# Patient Record
Sex: MALE | Race: WHITE | Employment: OTHER | URBAN - METROPOLITAN AREA
[De-identification: names, ages, dates, MRNs, and addresses within clinical notes are randomized per-mention and may not be internally consistent; named-entity substitution may affect disease eponyms.]

---

## 2017-02-27 RX ORDER — PRAVASTATIN SODIUM 20 MG/1
TABLET ORAL
Qty: 90 TAB | Refills: 1 | Status: SHIPPED | OUTPATIENT
Start: 2017-02-27 | End: 2017-03-08 | Stop reason: ALTCHOICE

## 2017-03-01 ENCOUNTER — LAB ONLY (OUTPATIENT)
Dept: INTERNAL MEDICINE CLINIC | Age: 82
End: 2017-03-01

## 2017-03-01 ENCOUNTER — HOSPITAL ENCOUNTER (OUTPATIENT)
Dept: LAB | Age: 82
Discharge: HOME | End: 2017-03-01
Payer: MEDICARE

## 2017-03-01 DIAGNOSIS — E78.5 DYSLIPIDEMIA: Primary | ICD-10-CM

## 2017-03-01 DIAGNOSIS — I25.10 ATHEROSCLEROSIS OF NATIVE CORONARY ARTERY OF NATIVE HEART WITHOUT ANGINA PECTORIS: ICD-10-CM

## 2017-03-01 DIAGNOSIS — E78.5 DYSLIPIDEMIA: ICD-10-CM

## 2017-03-01 DIAGNOSIS — Z00.00 ROUTINE GENERAL MEDICAL EXAMINATION AT A HEALTH CARE FACILITY: ICD-10-CM

## 2017-03-01 LAB
ALBUMIN SERPL BCP-MCNC: 3.9 G/DL (ref 3.4–5)
ALBUMIN/GLOB SERPL: 1.3 {RATIO} (ref 0.8–1.7)
ALP SERPL-CCNC: 76 U/L (ref 45–117)
ALT SERPL-CCNC: 28 U/L (ref 16–61)
ANION GAP BLD CALC-SCNC: 6 MMOL/L (ref 3–18)
AST SERPL W P-5'-P-CCNC: 19 U/L (ref 15–37)
BASOPHILS # BLD AUTO: 0 K/UL (ref 0–0.06)
BASOPHILS # BLD: 1 % (ref 0–2)
BILIRUB SERPL-MCNC: 0.5 MG/DL (ref 0.2–1)
BUN SERPL-MCNC: 19 MG/DL (ref 7–18)
BUN/CREAT SERPL: 20 (ref 12–20)
CALCIUM SERPL-MCNC: 9.3 MG/DL (ref 8.5–10.1)
CHLORIDE SERPL-SCNC: 106 MMOL/L (ref 100–108)
CHOLEST SERPL-MCNC: 212 MG/DL
CO2 SERPL-SCNC: 27 MMOL/L (ref 21–32)
CREAT SERPL-MCNC: 0.96 MG/DL (ref 0.6–1.3)
DIFFERENTIAL METHOD BLD: ABNORMAL
EOSINOPHIL # BLD: 0.2 K/UL (ref 0–0.4)
EOSINOPHIL NFR BLD: 5 % (ref 0–5)
ERYTHROCYTE [DISTWIDTH] IN BLOOD BY AUTOMATED COUNT: 13.9 % (ref 11.6–14.5)
GLOBULIN SER CALC-MCNC: 3.1 G/DL (ref 2–4)
GLUCOSE SERPL-MCNC: 88 MG/DL (ref 74–99)
HCT VFR BLD AUTO: 41.6 % (ref 36–48)
HDLC SERPL-MCNC: 46 MG/DL (ref 40–60)
HDLC SERPL: 4.6 {RATIO} (ref 0–5)
HGB BLD-MCNC: 13.6 G/DL (ref 13–16)
LDLC SERPL CALC-MCNC: 145 MG/DL (ref 0–100)
LIPID PROFILE,FLP: ABNORMAL
LYMPHOCYTES # BLD AUTO: 34 % (ref 21–52)
LYMPHOCYTES # BLD: 1.4 K/UL (ref 0.9–3.6)
MCH RBC QN AUTO: 31.1 PG (ref 24–34)
MCHC RBC AUTO-ENTMCNC: 32.7 G/DL (ref 31–37)
MCV RBC AUTO: 95 FL (ref 74–97)
MONOCYTES # BLD: 0.5 K/UL (ref 0.05–1.2)
MONOCYTES NFR BLD AUTO: 12 % (ref 3–10)
NEUTS SEG # BLD: 2 K/UL (ref 1.8–8)
NEUTS SEG NFR BLD AUTO: 48 % (ref 40–73)
PLATELET # BLD AUTO: 172 K/UL (ref 135–420)
PMV BLD AUTO: 10.7 FL (ref 9.2–11.8)
POTASSIUM SERPL-SCNC: 4 MMOL/L (ref 3.5–5.5)
PROT SERPL-MCNC: 7 G/DL (ref 6.4–8.2)
RBC # BLD AUTO: 4.38 M/UL (ref 4.7–5.5)
SODIUM SERPL-SCNC: 139 MMOL/L (ref 136–145)
TRIGL SERPL-MCNC: 105 MG/DL (ref ?–150)
TSH SERPL DL<=0.05 MIU/L-ACNC: 2.57 UIU/ML (ref 0.36–3.74)
VLDLC SERPL CALC-MCNC: 21 MG/DL
WBC # BLD AUTO: 4.1 K/UL (ref 4.6–13.2)

## 2017-03-01 PROCEDURE — 80061 LIPID PANEL: CPT | Performed by: INTERNAL MEDICINE

## 2017-03-01 PROCEDURE — 85025 COMPLETE CBC W/AUTO DIFF WBC: CPT | Performed by: INTERNAL MEDICINE

## 2017-03-01 PROCEDURE — 84443 ASSAY THYROID STIM HORMONE: CPT | Performed by: INTERNAL MEDICINE

## 2017-03-01 PROCEDURE — 80053 COMPREHEN METABOLIC PANEL: CPT | Performed by: INTERNAL MEDICINE

## 2017-03-01 PROCEDURE — 36415 COLL VENOUS BLD VENIPUNCTURE: CPT | Performed by: INTERNAL MEDICINE

## 2017-03-08 ENCOUNTER — OFFICE VISIT (OUTPATIENT)
Dept: INTERNAL MEDICINE CLINIC | Age: 82
End: 2017-03-08

## 2017-03-08 VITALS
OXYGEN SATURATION: 98 % | BODY MASS INDEX: 27.35 KG/M2 | DIASTOLIC BLOOD PRESSURE: 60 MMHG | HEIGHT: 70 IN | TEMPERATURE: 98.4 F | SYSTOLIC BLOOD PRESSURE: 138 MMHG | RESPIRATION RATE: 12 BRPM | WEIGHT: 191 LBS | HEART RATE: 59 BPM

## 2017-03-08 DIAGNOSIS — Z71.89 ADVANCE DIRECTIVE DISCUSSED WITH PATIENT: ICD-10-CM

## 2017-03-08 DIAGNOSIS — K21.00 REFLUX ESOPHAGITIS: ICD-10-CM

## 2017-03-08 DIAGNOSIS — E78.5 HYPERLIPIDEMIA LDL GOAL <100: ICD-10-CM

## 2017-03-08 DIAGNOSIS — I25.10 ATHEROSCLEROSIS OF NATIVE CORONARY ARTERY OF NATIVE HEART WITHOUT ANGINA PECTORIS: Primary | ICD-10-CM

## 2017-03-08 DIAGNOSIS — Z23 ENCOUNTER FOR IMMUNIZATION: ICD-10-CM

## 2017-03-08 DIAGNOSIS — Z00.00 MEDICARE ANNUAL WELLNESS VISIT, SUBSEQUENT: ICD-10-CM

## 2017-03-08 NOTE — PATIENT INSTRUCTIONS
Medicare Part B Preventive Services Limitations Recommendation Scheduled   Bone Mass Measurement  (age 72 & older, biennial) Requires diagnosis related to osteoporosis or estrogen deficiency. Biennial benefit unless patient has history of long-term glucocorticoid tx or baseline is needed because initial test was by other method Every 2 years or more frequently if medically necessary. N/A         Cardiovascular Screening Blood Tests (every 5 years)  Total cholesterol, HDL, Triglycerides Order as a panel if possible Every 5 years. Done:  3/1/2017         Colorectal Cancer Screening  -Fecal occult blood test (annual)  -Flexible sigmoidoscopy (5y)  -Screening colonoscopy (10y)  -Barium Enema Colorectal Cancer Screening (Ages 54-65 yrs old)  For all patients 48 and older:  -Annual fecal occult blood test or  colonoscopy every 10 years or  -Flexible sigmoidoscopy every 5 years or  -lower endoscopy to be performed more frequently, if advised by GI. N/A         Counseling to Prevent Tobacco Use (up to 8 sessions per year)  - Counseling greater than 3 and up to 10 minutes  - Counseling greater than 10 minutes Patients must be asymptomatic of tobacco-related conditions to receive as preventive service Two cessation counseling attempts (up to 8 counseling sessions) per year. N/A   Diabetes Screening Tests (at least every 3 years, Medicare covers annually or at 6-month intervals for prediabetic patients)    Fasting blood sugar (FBS) or glucose tolerance test (GTT) Patient must be diagnosed with one of the following:  -Hypertension, Dyslipidemia, obesity, previous impaired FBS or GTT  Or any two of the following: overweight, FH of diabetes, age ? 72, history of gestational diabetes, birth of baby weighing more than 9 pounds Annually or every 6 months if previous diagnosis of elevated FBS, elevated HbA1c, or impaired GTT, or glucosuria.  Done:  3/1/2017         Diabetes Self-Management Training (DSMT) (no USPSTF recommendation) Requires referral by treating physician for patient with diabetes or renal disease. 10 hours of initial DSMT session of no less than 30 minutes each in a continuous 12-month period. 2 hours of follow-up DSMT in subsequent years. Up to 10 hours of initial training within a continuous 12 month period of subsequent years: up to 2 hours of follow-up training each year after the initial year. N/A   Glaucoma Screening (no USPSTF recommendation) Diabetes mellitus, family history, , age 48 or over,  American, age 72 or over Annually for covered beneficiaries. Seen at [facility]. Will contact to request last eye exam.          Human Immunodeficiency Virus (HIV) Screening (annually for increased risk patients)  HIV-1 and HIV-2 by EIA, KARON, rapid antibody test, or oral mucosa transudate Patient must be at increased risk for HIV infection per USPSTF guidelines or pregnant. Tests covered annually for patients at increased risk. Pregnant patients may receive up to 3 test during pregnancy. Annually for beneficiaries at increased risk, including anyone who asks for the test. Not at risk   Medical Nutrition Therapy (MNT) (for diabetes or renal disease not recommended schedule) Requires referral by treating physician for patient with diabetes or renal disease. Can be provided in same year as diabetes self-management training (DSMT), and CMS recommends medical nutrition therapy take place after DSMT. Up to 3 hours for initial year and 2 hours in subsequent years. First year: 3 hours of one-on-one counseling or subsequent years: 2 hours.  N/A   Prostate Cancer Screening (annually up to age 76)  - Digital rectal exam (DEQUAN)  - Prostate specific antigen (PSA) Annually (age 48 or over), DEQUAN not paid separately when covered E/M service is provided on same date Once every 12 months for patients age older than 48years of age includes: digital rectal exam and/or prostate specific antigen test. N/A Seasonal Influenza Vaccination (annually)  Once per fall or winter season. Done:  3/8/2017           Pneumococcal Vaccination (once after 72)  Once after age 72 and if more than 5 years since last vaccination and/or uncertainty of vaccine status. Pneumococcal:  7/2/2014    Prevnar 13:  3/8/2017   Hepatitis B Vaccinations (if medium/high risk) Medium/high risk factors:  End-stage renal disease,  Hemophiliacs who received Factor VIII or IX concentrates, Clients of institutions for the mentally retarded, Persons who live in the same house as a HepB virus carrier, Homosexual men, Illicit injectable drug abusers. Schedule course of vaccines if patient not previously vaccinated  *additional shots if medically necessary. Not at risk   Screening Mammography (biennial age 54-69)? Annually (age 36 or over) Age 28 through 44: one baseline or aged 36 and older: annually. N/A         Screening Pap Tests and Pelvic Examination (up to age 79 and after 79 if unknown history or abnormal study last 10 years) Every 24 months except high risk Annually if at stevo risk for developing cervical or vaginal cancer, or childbearing, age with abnormal Pap test within past 3 years or every 2 years for women at normal risk. N/A           Ultrasound Screening for Abdominal Aortic Aneurysm (AAA) (once) Patient must be referred through IPPE and not have had a screening for abdominal aortic aneurysm before under Medicare. Limited to patients who meet one of the following criteria:  - Men who are 73-68 years old and have smoked more than 100 cigarettes in their lifetime.  -Anyone with a FH of AAA  -Anyone recommended for screening by USPSTF Once in a lifetime. N/A         Schedule of Personalized Health Plan  (Provide Copy to Patient)  The best way to stay healthy is to live a healthy lifestyle. A healthy lifestyle includes regular exercise, eating a well-balanced diet, keeping a healthy weight and not smoking.     Regular physical exams and screening tests are another important way to take care of yourself. Preventive exams provided by health care providers can find health problems early when treatment works best and can keep you from getting certain diseases or illnesses. Preventive services include exams, lab tests, screenings, shots, monitoring and information to help you take care of your own health. All people over 65 should have a pneumonia shot. Pneumonia shots are usually only needed once in a lifetime unless your doctor decides differently. All people over 65 should have a yearly flu shot. People over 65 are at medium to high risk for Hepatitis B. Three shots are needed for complete protection. In addition to your physical exam, some screening tests are recommended:    Bone mass measurement (dexa scan) is recommended every two years if you have certain risk factors, such as personal history of vertebral fracture or chronic steroid medication use    Diabetes Mellitus screening is recommended every year. Glaucoma is an eye disease caused by high pressure in the eye. An eye exam is recommended every year. Cardiovascular screening tests that check your cholesterol and other blood fat (lipid) levels are recommended every five years. Colorectal Cancer screening tests help to find pre-cancerous polyps (growths in the colon) so they can be removed before they turn into cancer. Tests ordered for screening depend on your personal and family history risk factors.     Screening for Prostate Cancer is recommended yearly with a digital rectal exam and/or a PSA test    Here is a list of your current Health Maintenance items with a due date:  Health Maintenance   Topic Date Due    GLAUCOMA SCREENING Q2Y  06/01/2017 (Originally 1/8/2017)   Ebenezer Velásquez DTaP/Tdap/Td series (1 - Tdap) 08/31/2017 (Originally 8/17/1956)   [id number] \[facility]\""  03/09/2018    ZOSTER VACCINE AGE 60>  Completed    Pneumococcal 65+ Low/Medium Risk  Completed    INFLUENZA AGE 9 TO ADULT  Completed            Advance Directives: Care Instructions  Your Care Instructions  An advance directive is a legal way to state your wishes at the end of your life. It tells your family and your doctor what to do if you can no longer say what you want. There are two main types of advance directives. You can change them any time that your wishes change. · A living will tells your family and your doctor your wishes about life support and other treatment. · A medical power of  lets you name a person to make treatment decisions for you when you can't speak for yourself. This person is called a health care agent. If you do not have an advance directive, decisions about your medical care may be made by a doctor or a  who doesn't know you. It may help to think of an advance directive as a gift to the people who care for you. If you have one, they won't have to make tough decisions by themselves. Follow-up care is a key part of your treatment and safety. Be sure to make and go to all appointments, and call your doctor if you are having problems. It's also a good idea to know your test results and keep a list of the medicines you take. How can you care for yourself at home? · Discuss your wishes with your loved ones and your doctor. This way, there are no surprises. · Many states have a unique form. Or you might use a universal form that has been approved by many states. This kind of form can sometimes be completed and stored online. Your electronic copy will then be available wherever you have a connection to the Internet. In most cases, doctors will respect your wishes even if you have a form from a different state. · You don't need a  to do an advance directive. But you may want to get legal advice. · Think about these questions when you prepare an advance directive:  ¨ Who do you want to make decisions about your medical care if you are not able to?  Many people choose a family member, close friend, or doctor. ¨ Do you know enough about life support methods that might be used? If not, talk to your doctor so you understand. ¨ What are you most afraid of that might happen? You might be afraid of having pain, losing your independence, or being kept alive by machines. ¨ Where would you prefer to die? Choices include your home, a hospital, or a nursing home. ¨ Would you like to have information about hospice care to support you and your family? ¨ Do you want to donate organs when you die? ¨ Do you want certain Sabianist practices performed before you die? If so, put your wishes in the advance directive. · Read your advance directive every year, and make changes as needed. When should you call for help? Be sure to contact your doctor if you have any questions. Where can you learn more? Go to http://alex-jeffry.info/. Enter R264 in the search box to learn more about \"Advance Directives: Care Instructions. \"  Current as of: February 24, 2016  Content Version: 11.1  © 1125-4775 [employer], [employer]orporated. Care instructions adapted under license by [employer] (which disclaims liability or warranty for this information). If you have questions about a medical condition or this instruction, always ask your healthcare professional. [address] any warranty or liability for your use of this information.

## 2017-03-08 NOTE — PROGRESS NOTES
Felix Salazar is a 80 y.o. male and presents for annual Medicare Wellness Visit. Problem List: Reviewed with patient and discussed risk factors. Patient Active Problem List   Diagnosis Code    Reflux esophagitis K21.0    Coronary artery disease I25.10    Advance directive discussed with patient Z71.89    Hyperlipidemia LDL goal <100 E78.5       Current medical providers:  Patient Care Team:  Gayla Tatum MD as PCP - General (Internal Medicine)  Edward Weiner, RN as Ambulatory Care Navigator (Internal Medicine)  Oren Tejada MD (Cardiology)  BRITT Langford (Physician Assistant)  Marielena Holguin, RN as Ambulatory Care Navigator (Internal Medicine)    PSH: Reviewed with patient  Past Surgical History:   Procedure Laterality Date    HX APPENDECTOMY      HX CORONARY ARTERY BYPASS GRAFT      9/10   LIMA - LAD/D1,  SVG - OM,  SVG - PDA    HX HERNIA REPAIR      1974        SH: Reviewed with patient  Social History   Substance Use Topics    Smoking status: Never Smoker    Smokeless tobacco: Never Used    Alcohol use No       FH: Reviewed with patient  Family History   Problem Relation Age of Onset    Cancer Mother      liver       Medications/Allergies: Reviewed with patient  Current Outpatient Prescriptions on File Prior to Visit   Medication Sig Dispense Refill    rosuvastatin (CRESTOR) 20 mg tablet Take 1 Tab by mouth nightly. 30 Tab 11    aspirin 81 mg tablet Take 81 mg by mouth.  MULTIVITAMIN PO Take  by mouth. No current facility-administered medications on file prior to visit.        Allergies   Allergen Reactions    Amoxicillin Other (comments)     Felt shaky and nervous    Lipitor [Atorvastatin] Myalgia    Pcn [Penicillins] Other (comments)     Diaphoretic, elevates blood pressure, insomnia    Pravastatin Myalgia       Objective:  Visit Vitals    /60    Pulse (!) 59    Temp 98.4 °F (36.9 °C) (Oral)    Resp 12    Ht 5' 10\" (1.778 m)    Wt 191 lb (86.6 kg)    SpO2 98%    BMI 27.41 kg/m2    Body mass index is 27.41 kg/(m^2). Assessment of cognitive impairment: Alert and oriented x 3    Depression Screen:   PHQ 2 / 9, over the last two weeks 3/8/2017   Little interest or pleasure in doing things Not at all   Feeling down, depressed or hopeless Not at all   Total Score PHQ 2 0       Fall Risk Assessment:    Fall Risk Assessment, last 12 mths 3/8/2017   Able to walk? Yes   Fall in past 12 months? No       Functional Ability:   Does the patient exhibit a steady gait? yes   How long did it take the patient to get up and walk from a sitting position? 1 second   Is the patient self reliant?  (ie can do own laundry, meals, household chores)  yes     Does the patient handle his/her own medications? yes     Does the patient handle his/her own money? yes     Is the patients home safe (ie good lighting, handrails on stairs and bath, etc.)? yes     Did you notice or did patient express any hearing difficulties? no     Did you notice or did patient express any vision difficulties?   no     Were distance and reading eye charts used?   no       Advance Care Planning:   Patient was offered the opportunity to discuss advance care planning:  yes     Does patient have an Advance Directive:  Yes; willing to provide copy to office   If no, did you provide information on Caring Connections?  no       Plan:      Orders Placed This Encounter    Pneumococcal conjugate 13 valent, IM (PREVNAR-13)    Influenza virus vaccine (FLUZONE HIGH-DOSE) 65 years and older    LIPID PANEL    METABOLIC PANEL, COMPREHENSIVE    CBC WITH AUTOMATED DIFF       Health Maintenance   Topic Date Due    GLAUCOMA SCREENING Q2Y  06/01/2017 (Originally 1/8/2017)   [facility] DTaP/Tdap/Td series (1 - Tdap) 08/31/2017 (Originally 8/17/1956)   [address] [place] [address]  03/09/2018    ZOSTER VACCINE AGE 60>  Completed    Pneumococcal 65+ Low/Medium Risk  Completed    INFLUENZA AGE 9 TO ADULT Completed       *Patient verbalized understanding and agreement with the plan. A copy of the After Visit Summary with personalized health plan was given to the patient today.

## 2017-03-08 NOTE — PROGRESS NOTES
Franny Manuel 1935, is a 80 y.o. male, who is seen today for reevaluation of atherosclerotic heart disease hyperlipidemia DJD. He feels generally well and is still working, almost [de-identified]years old. He works in the [employer] industry [employer]. His license comes up for renewal in August and he will need another checkup at that time he says. He is eating a healthy diet and taking all of his medicine correctly. No exertional chest discomfort or dyspnea, not quite as much stamina as he used to have. Past Medical History:   Diagnosis Date    BPH     CABG     9/10   LIMA - LAD/D1,  SVG - OM,  SVG - PDA    Coronary artery disease     Dyslipidemia     GERD     Peptic ulcer disease      Current Outpatient Prescriptions   Medication Sig Dispense Refill    rosuvastatin (CRESTOR) 20 mg tablet Take 1 Tab by mouth nightly. 30 Tab 11    aspirin 81 mg tablet Take 81 mg by mouth.  MULTIVITAMIN PO Take  by mouth. Past Surgical History:   Procedure Laterality Date    HX APPENDECTOMY      HX CORONARY ARTERY BYPASS GRAFT      9/10   LIMA - LAD/D1,  SVG - OM,  SVG - PDA    HX HERNIA REPAIR      1974     Allergies   Allergen Reactions    Amoxicillin Other (comments)     Felt shaky and nervous    Lipitor [Atorvastatin] Myalgia    Pcn [Penicillins] Other (comments)     Diaphoretic, elevates blood pressure, insomnia    Pravastatin Myalgia     Social History     Social History    Marital status:      Spouse name: N/A    Number of children: N/A    Years of education: N/A     Social History Main Topics    Smoking status: Never Smoker    Smokeless tobacco: Never Used    Alcohol use No    Drug use: No    Sexual activity: Not Asked     Other Topics Concern    None     Social History Narrative     Visit Vitals    /60    Pulse (!) 59    Temp 98.4 °F (36.9 °C) (Oral)    Resp 12    Ht 5' 10\" (1.778 m)    Wt 191 lb (86.6 kg)    SpO2 98%    BMI 27.41 kg/m2     Neck is supple. Carotids are 2+ without bruits. No adenopathy or thyromegaly. Lungs are clear to percussion. Good breath sounds with no wheezing or crackles. Heart reveals a regular rhythm with normal S1 and S2 no murmur gallop click or rub. Apical impulse is not palpable. Abdomen is soft and nontender with no hepatosplenomegaly or masses and no bruits. Extremities reveal no clubbing cyanosis or edema. Pulses are 2+ throughout. Gait is normal.    Results for orders placed or performed during the hospital encounter of 03/01/17   CBC WITH AUTOMATED DIFF   Result Value Ref Range    WBC 4.1 (L) 4.6 - 13.2 K/uL    RBC 4.38 (L) 4.70 - 5.50 M/uL    HGB 13.6 13.0 - 16.0 g/dL    HCT 41.6 36.0 - 48.0 %    MCV 95.0 74.0 - 97.0 FL    MCH 31.1 24.0 - 34.0 PG    MCHC 32.7 31.0 - 37.0 g/dL    RDW 13.9 11.6 - 14.5 %    PLATELET 072 602 - 294 K/uL    MPV 10.7 9.2 - 11.8 FL    NEUTROPHILS 48 40 - 73 %    LYMPHOCYTES 34 21 - 52 %    MONOCYTES 12 (H) 3 - 10 %    EOSINOPHILS 5 0 - 5 %    BASOPHILS 1 0 - 2 %    ABS. NEUTROPHILS 2.0 1.8 - 8.0 K/UL    ABS. LYMPHOCYTES 1.4 0.9 - 3.6 K/UL    ABS. MONOCYTES 0.5 0.05 - 1.2 K/UL    ABS. EOSINOPHILS 0.2 0.0 - 0.4 K/UL    ABS.  BASOPHILS 0.0 0.0 - 0.06 K/UL    DF AUTOMATED     LIPID PANEL   Result Value Ref Range    LIPID PROFILE          Cholesterol, total 212 (H) <200 MG/DL    Triglyceride 105 <150 MG/DL    HDL Cholesterol 46 40 - 60 MG/DL    LDL, calculated 145 (H) 0 - 100 MG/DL    VLDL, calculated 21 MG/DL    CHOL/HDL Ratio 4.6 0 - 5.0     METABOLIC PANEL, COMPREHENSIVE   Result Value Ref Range    Sodium 139 136 - 145 mmol/L    Potassium 4.0 3.5 - 5.5 mmol/L    Chloride 106 100 - 108 mmol/L    CO2 27 21 - 32 mmol/L    Anion gap 6 3.0 - 18 mmol/L    Glucose 88 74 - 99 mg/dL    BUN 19 (H) 7.0 - 18 MG/DL    Creatinine 0.96 0.6 - 1.3 MG/DL    BUN/Creatinine ratio 20 12 - 20      GFR est AA >60 >60 ml/min/1.73m2    GFR est non-AA >60 >60 ml/min/1.73m2    Calcium 9.3 8.5 - 10.1 MG/DL    Bilirubin, total 0.5 0.2 - 1.0 MG/DL    ALT (SGPT) 28 16 - 61 U/L    AST (SGOT) 19 15 - 37 U/L    Alk. phosphatase 76 45 - 117 U/L    Protein, total 7.0 6.4 - 8.2 g/dL    Albumin 3.9 3.4 - 5.0 g/dL    Globulin 3.1 2.0 - 4.0 g/dL    A-G Ratio 1.3 0.8 - 1.7     TSH 3RD GENERATION   Result Value Ref Range    TSH 2.57 0.36 - 3.74 uIU/mL     Assessment: #1. ASHD asymptomatic. He will continue aspirin 81 mg daily. #2. Hyperlipidemia not doing as well, he will try to tighten up his diet a little bit and continue Crestor 20 mg each evening. #3. DJD doing well. He gets around well without medication most of the time. He had a Medicare wellness evaluation this afternoon by Nicolasa Steiner and I agree with her note. Follow-up will be in early August for follow-up and to see if there is any paperwork he needs filled out for his license. Rodolfo Joseph MD FACP    Please note: This document has been produced using voice recognition software. Unrecognized errors in transcription may be present.

## 2017-03-23 ENCOUNTER — PATIENT OUTREACH (OUTPATIENT)
Dept: INTERNAL MEDICINE CLINIC | Age: 82
End: 2017-03-23

## 2017-03-23 NOTE — PROGRESS NOTES
Contacted Dr. Vasiliy Baird office to request most recent glaucoma screening. Spoke to Lenora Jenkins. Introduced self and reason for call. Provided 2 patient identifiers. Last seen in 2015. Provided office fax number.

## 2017-04-20 ENCOUNTER — PATIENT OUTREACH (OUTPATIENT)
Dept: INTERNAL MEDICINE CLINIC | Age: 82
End: 2017-04-20

## 2017-05-03 ENCOUNTER — TELEPHONE (OUTPATIENT)
Dept: INTERNAL MEDICINE CLINIC | Age: 82
End: 2017-05-03

## 2017-05-03 NOTE — TELEPHONE ENCOUNTER
Patient dropped off form to be filled out for the [address] [place] [address]. Placed in your box.

## 2017-05-09 ENCOUNTER — OFFICE VISIT (OUTPATIENT)
Dept: INTERNAL MEDICINE CLINIC | Age: 82
End: 2017-05-09

## 2017-05-09 VITALS
RESPIRATION RATE: 14 BRPM | DIASTOLIC BLOOD PRESSURE: 66 MMHG | BODY MASS INDEX: 27.32 KG/M2 | HEIGHT: 70 IN | OXYGEN SATURATION: 98 % | SYSTOLIC BLOOD PRESSURE: 130 MMHG | TEMPERATURE: 98.2 F | WEIGHT: 190.8 LBS | HEART RATE: 74 BPM

## 2017-05-09 DIAGNOSIS — I25.10 ATHEROSCLEROSIS OF NATIVE CORONARY ARTERY OF NATIVE HEART WITHOUT ANGINA PECTORIS: Primary | ICD-10-CM

## 2017-05-09 DIAGNOSIS — E78.5 HYPERLIPIDEMIA LDL GOAL <100: ICD-10-CM

## 2017-05-09 DIAGNOSIS — K21.00 REFLUX ESOPHAGITIS: ICD-10-CM

## 2017-05-09 NOTE — MR AVS SNAPSHOT
Visit Information Date & Time Provider Department Dept. Phone Encounter #  
 5/9/2017  1:30 PM Ginger Mcdaniels MD [facility] [address] [id number] Upcoming Health Maintenance Date Due  
 GLAUCOMA SCREENING Q2Y 6/1/2017* DTaP/Tdap/Td series (1 - Tdap) 8/31/2017* INFLUENZA AGE 9 TO ADULT 8/1/2017 MEDICARE YEARLY EXAM 3/9/2018 *Topic was postponed. The date shown is not the original due date. Allergies as of 5/9/2017  Review Complete On: 5/9/2017 By: Ginger Mcdaniels MD  
  
 Severity Noted Reaction Type Reactions Amoxicillin Medium 04/01/2016   Side Effect Other (comments) Felt shaky and nervous Lipitor [Atorvastatin]  06/02/2016    Myalgia Pcn [Penicillins]  03/29/2016   Side Effect Other (comments) Diaphoretic, elevates blood pressure, insomnia Pravastatin  06/02/2016    Myalgia Current Immunizations  Reviewed on 1/13/2015 Name Date Influenza High Dose Vaccine PF 3/8/2017, 11/17/2015  3:35 PM, 11/7/2013 Influenza Vaccine 1/13/2015 Influenza Vaccine Split 11/14/2012  9:45 AM, 9/20/2011 Influenza Vaccine Whole 9/13/2010 Pneumococcal Conjugate (PCV-13) 3/8/2017 Pneumococcal Polysaccharide (PPSV-23) 7/2/2014  9:51 AM  
 Zoster Vaccine, Live 1/13/2015 Not reviewed this visit You Were Diagnosed With   
  
 Codes Comments Atherosclerosis of native coronary artery of native heart without angina pectoris    -  Primary ICD-10-CM: I25.10 ICD-9-CM: 414.01 Hyperlipidemia LDL goal <100     ICD-10-CM: E78.5 ICD-9-CM: 272.4 Reflux esophagitis     ICD-10-CM: K21.0 ICD-9-CM: 530.11 Vitals BP Pulse Temp Resp Height(growth percentile) Weight(growth percentile) 130/66 (BP 1 Location: Right arm, BP Patient Position: Sitting) 74 98.2 °F (36.8 °C) (Oral) 14 5' 10\" (1.778 m) 190 lb 12.8 oz (86.5 kg) SpO2 BMI Smoking Status 98% 27.38 kg/m2 Never Smoker Vitals History BMI and BSA Data Body Mass Index Body Surface Area  
 27.38 kg/m 2 2.07 m 2 Preferred Pharmacy Pharmacy Name Phone [facility] [phone] Your Updated Medication List  
  
   
This list is accurate as of: 5/9/17  2:42 PM.  Always use your most recent med list.  
  
  
  
  
 aspirin 81 mg tablet Take 81 mg by mouth. MULTIVITAMIN PO Take  by mouth. rosuvastatin 20 mg tablet Commonly known as:  CRESTOR Take 1 Tab by mouth nightly. Introducing \[facility]\"" & HEALTH SERVICES! Cathy Dozier introduces [employer] patient portal. Now you can access parts of your medical record, email your doctor's office, and request medication refills online. 1. In your internet browser, go to [email or web address] 2. Click on the First Time User? Click Here link in the Sign In box. You will see the New Member Sign Up page. 3. Enter your [employer] Access Code exactly as it appears below. You will not need to use this code after youve completed the sign-up process. If you do not sign up before the expiration date, you must request a new code. · [employer] Access Code: [email or web address] Expires: 6/6/2017  2:52 PM 
 
4. Enter the last four digits of your Social Security Number (xxxx) and Date of Birth (mm/dd/yyyy) as indicated and click Submit. You will be taken to the next sign-up page. 5. Create a [employer] ID. This will be your [employer] login ID and cannot be changed, so think of one that is secure and easy to remember. 6. Create a [employer] password. You can change your password at any time. 7. Enter your Password Reset Question and Answer. This can be used at a later time if you forget your password. 8. Enter your e-mail address. You will receive e-mail notification when new information is available in [address] 9. Click Sign Up. You can now view and download portions of your medical record. 10. Click the Download Summary menu link to download a portable copy of your medical information. If you have questions, please visit the Frequently Asked Questions section of the [employer] website. Remember, [employer] is NOT to be used for urgent needs. For medical emergencies, dial 911. Now available from your iPhone and Android! Please provide this summary of care documentation to your next provider. Your primary care clinician is listed as 77Niraj Vera. If you have any questions after today's visit, please call [phone].

## 2017-05-10 NOTE — PROGRESS NOTES
Charlette Morales 1935, is a 80 y.o. male, who is seen today for reevaluation of atherosclerotic heart disease hyperlipidemia and reflux. He is currently doing very well with reflux and is now off medication. Is having no day or night symptoms. He has a history of atherosclerotic heart disease having had bypass surgery in September 2010 has been pain-free since then. He denies exertional discomfort in the chest neck jaw back or arm. He also denies dyspnea on exertion PND or orthopnea. He has hyperlipidemia and takes Crestor regularly and follows a very low fat diet and maintains good weight. Past Medical History:   Diagnosis Date    BPH     CABG     9/10   LIMA - LAD/D1,  SVG - OM,  SVG - PDA    Coronary artery disease     Dyslipidemia     GERD     Peptic ulcer disease      Current Outpatient Prescriptions   Medication Sig Dispense Refill    rosuvastatin (CRESTOR) 20 mg tablet Take 1 Tab by mouth nightly. 30 Tab 11    aspirin 81 mg tablet Take 81 mg by mouth.  MULTIVITAMIN PO Take  by mouth. Visit Vitals    /66 (BP 1 Location: Right arm, BP Patient Position: Sitting)    Pulse 74    Temp 98.2 °F (36.8 °C) (Oral)    Resp 14    Ht 5' 10\" (1.778 m)    Wt 190 lb 12.8 oz (86.5 kg)    SpO2 98%    BMI 27.38 kg/m2     Carotids are 2+ without bruits. Lungs are clear to percussion. Good breath sounds with no wheezing or crackles. Visual fields on right and left are full with vision to 170° in the upper and lower quadrants laterally in both the right and left eye. Color vision is normal.  Visual acuity with glasses is 20/20 in the right and in the left and with both. Heart reveals a regular rhythm with normal S1 and S2 no murmur gallop click or rub. Apical impulse is not palpable. Abdomen is soft and nontender with no hepatosplenomegaly or masses and no bruits. Extremities reveal no clubbing cyanosis or edema. Pulses are 2+ throughout. Romberg is absent.   Gait is normal. Neurologic exam is nonfocal throughout. Assessment: #1. Atherosclerotic heart disease asymptomatic since bypass surgery which was done in September 2010. He will continue aspirin 81 mg daily. #2. Hyperlipidemia doing well. He will continue Crestor 20 mg daily. #3.  History of reflux doing well. No medication is needed currently. If reflux becomes frequent he will call again. Rodolfo Palma MD FACP    Please note: This document has been produced using voice recognition software. Unrecognized errors in transcription may be present.

## 2017-05-17 ENCOUNTER — TELEPHONE (OUTPATIENT)
Dept: INTERNAL MEDICINE CLINIC | Age: 82
End: 2017-05-17

## 2017-08-01 DIAGNOSIS — I25.10 ATHEROSCLEROSIS OF NATIVE CORONARY ARTERY OF NATIVE HEART WITHOUT ANGINA PECTORIS: ICD-10-CM

## 2017-08-26 RX ORDER — PRAVASTATIN SODIUM 20 MG/1
TABLET ORAL
Qty: 90 TAB | Refills: 1 | Status: SHIPPED | OUTPATIENT
Start: 2017-08-26 | End: 2018-03-05 | Stop reason: SINTOL

## 2018-03-05 ENCOUNTER — OFFICE VISIT (OUTPATIENT)
Dept: INTERNAL MEDICINE CLINIC | Age: 83
End: 2018-03-05

## 2018-03-05 VITALS
OXYGEN SATURATION: 96 % | SYSTOLIC BLOOD PRESSURE: 138 MMHG | WEIGHT: 189 LBS | HEIGHT: 70 IN | DIASTOLIC BLOOD PRESSURE: 68 MMHG | BODY MASS INDEX: 27.06 KG/M2 | HEART RATE: 63 BPM | TEMPERATURE: 98 F | RESPIRATION RATE: 12 BRPM

## 2018-03-05 DIAGNOSIS — I10 ESSENTIAL HYPERTENSION: ICD-10-CM

## 2018-03-05 DIAGNOSIS — K21.00 REFLUX ESOPHAGITIS: ICD-10-CM

## 2018-03-05 DIAGNOSIS — E78.5 HYPERLIPIDEMIA LDL GOAL <100: ICD-10-CM

## 2018-03-05 DIAGNOSIS — I25.10 ATHEROSCLEROSIS OF NATIVE CORONARY ARTERY OF NATIVE HEART WITHOUT ANGINA PECTORIS: Primary | ICD-10-CM

## 2018-03-05 RX ORDER — ROSUVASTATIN CALCIUM 20 MG/1
20 TABLET, COATED ORAL
Qty: 90 TAB | Refills: 3 | Status: SHIPPED | OUTPATIENT
Start: 2018-03-05 | End: 2018-05-17 | Stop reason: SDUPTHER

## 2018-03-05 NOTE — MR AVS SNAPSHOT
[facility] 
 
 
 [address], [address] [facility] 
[phone] Patient: Pablito Kay MRN: [id number] HRD:6/89/1842 Visit Information Date & Time Provider Department Dept. Phone Encounter #  
 3/5/2018  2:00 PM Carlita Garcia MD [facility] [phone] [id number] Follow-up Instructions Follow-up and Disposition History Your Appointments 3/5/2018  2:00 PM  
Office Visit with Carlita Garcia MD  
[facility]) Appt Note: 6 mo f/u  
 [address] [facility], [place] [address] [address]  
  
   
 [address], [address]  
  
    
 8/31/2018 10:55 AM  
LAB with [facility] NURSE VISIT [facility] ([facility]) Appt Note: lab  
 [address], [address] [address] [address]  
  
   
 [address], [phone] [phone]  
  
    
 9/7/2018  3:00 PM  
PHYSICAL with Carlita Garcia MD  
[facility]) Appt Note: rpe  
 [address] [facility], [facility] [address] [address]  
  
   
 [address], [address] Upcoming Health Maintenance Date Due DTaP/Tdap/Td series (1 - Tdap) 8/17/1956 MEDICARE YEARLY EXAM 3/9/2018 GLAUCOMA SCREENING Q2Y 5/26/2019 Allergies as of 3/5/2018  Review Complete On: 3/5/2018 By: Carlita Garcia MD  
  
 Severity Noted Reaction Type Reactions Amoxicillin Medium 04/01/2016   Side Effect Other (comments) Felt shaky and nervous Lipitor [Atorvastatin]  06/02/2016    Myalgia Pcn [Penicillins]  03/29/2016   Side Effect Other (comments) Diaphoretic, elevates blood pressure, insomnia Pravastatin  06/02/2016    Myalgia Current Immunizations  Reviewed on 1/13/2015 Name Date Influenza High Dose Vaccine PF 3/8/2017, 11/17/2015  3:35 PM, 11/7/2013 Influenza Vaccine 1/13/2015 Influenza Vaccine Split 11/14/2012  9:45 AM, 9/20/2011 Influenza Vaccine Whole 9/13/2010 Pneumococcal Conjugate (PCV-13) 3/8/2017 Pneumococcal Polysaccharide (PPSV-23) 7/2/2014  9:51 AM  
 Zoster Vaccine, Live 1/13/2015 Not reviewed this visit You Were Diagnosed With   
  
 Codes Comments Atherosclerosis of native coronary artery of native heart without angina pectoris    -  Primary ICD-10-CM: I25.10 ICD-9-CM: 414.01 Hyperlipidemia LDL goal <100     ICD-10-CM: E78.5 ICD-9-CM: 272.4 Reflux esophagitis     ICD-10-CM: K21.0 ICD-9-CM: 530.11 Essential hypertension     ICD-10-CM: I10 
ICD-9-CM: 401.9 Vitals BP Pulse Temp Resp Height(growth percentile) Weight(growth percentile)  
 138/68 63 98 °F (36.7 °C) (Oral) 12 5' 10\" (1.778 m) 189 lb (85.7 kg) SpO2 BMI Smoking Status 96% 27.12 kg/m2 Never Smoker Vitals History BMI and BSA Data Body Mass Index Body Surface Area  
 27.12 kg/m 2 2.06 m 2 Preferred Pharmacy Pharmacy Name Phone [facility], [address] [place] [address] [phone] Your Updated Medication List  
  
   
This list is accurate as of 3/5/18  1:11 PM.  Always use your most recent med list.  
  
  
  
  
 aspirin 81 mg tablet Take 81 mg by mouth. MULTIVITAMIN PO Take  by mouth. rosuvastatin 20 mg tablet Commonly known as:  CRESTOR Take 1 Tab by mouth nightly. Prescriptions Sent to Pharmacy Refills  
 rosuvastatin (CRESTOR) 20 mg tablet 3 Sig: Take 1 Tab by mouth nightly. Class: Normal  
 Pharmacy: [id number] [address] [facility], [address] [place] [address] #: [phone] Route: Oral  
  
To-Do List   
 Around 09/19/2018 Lab:  CBC WITH AUTOMATED DIFF Around 09/19/2018   Lab:  LIPID PANEL   
  
 Around 09/19/2018 Lab:  METABOLIC PANEL, COMPREHENSIVE Introducing [facility] & HEALTH SERVICES! Alfonso Arellano introduces [employer] patient portal. Now you can access parts of your medical record, email your doctor's office, and request medication refills online. 1. In your internet browser, go to [email or web address] 2. Click on the First Time User? Click Here link in the Sign In box. You will see the New Member Sign Up page. 3. Enter your [employer] Access Code exactly as it appears below. You will not need to use this code after youve completed the sign-up process. If you do not sign up before the expiration date, you must request a new code. · [employer] Access Code: [email or web address] Expires: 6/3/2018  1:11 PM 
 
4. Enter the last four digits of your Social Security Number (xxxx) and Date of Birth (mm/dd/yyyy) as indicated and click Submit. You will be taken to the next sign-up page. 5. Create a [employer] ID. This will be your [employer] login ID and cannot be changed, so think of one that is secure and easy to remember. 6. Create a [employer] password. You can change your password at any time. 7. Enter your Password Reset Question and Answer. This can be used at a later time if you forget your password. 8. Enter your e-mail address. You will receive e-mail notification when new information is available in [address] 9. Click Sign Up. You can now view and download portions of your medical record. 10. Click the Download Summary menu link to download a portable copy of your medical information. If you have questions, please visit the Frequently Asked Questions section of the [employer] website. Remember, [employer] is NOT to be used for urgent needs. For medical emergencies, dial 911. Now available from your iPhone and Android! Please provide this summary of care documentation to your next provider. Your primary care clinician is listed as Michelle Shine. Carmen Granados.  If you have any questions after today's visit, please call [phone].

## 2018-05-17 ENCOUNTER — TELEPHONE (OUTPATIENT)
Dept: INTERNAL MEDICINE CLINIC | Age: 83
End: 2018-05-17

## 2018-05-17 NOTE — TELEPHONE ENCOUNTER
Patient daughter Payam Leung calling says patient has allergic reactions to the generic for Crestor. Pharmacy told her to ask RM to write script for Crestor but to jean it saying it has to be name brand.  Sent to [facility] on [address]

## 2018-05-18 RX ORDER — ROSUVASTATIN CALCIUM 20 MG/1
20 TABLET, FILM COATED ORAL
Qty: 90 TAB | Refills: 3 | Status: SHIPPED | OUTPATIENT
Start: 2018-05-18 | End: 2018-05-23 | Stop reason: SDUPTHER

## 2018-05-22 NOTE — TELEPHONE ENCOUNTER
Pts daughter is in office trying to get pts medication Crestor sent [facility] [address].  He needs the script sent marked as name brand or they will not give name brand rx      Pt is out of medication    Ambar # [phone]

## 2018-05-23 RX ORDER — ROSUVASTATIN CALCIUM 20 MG/1
20 TABLET, FILM COATED ORAL
Qty: 90 TAB | Refills: 3 | Status: SHIPPED | OUTPATIENT
Start: 2018-05-23 | End: 2019-06-03 | Stop reason: SDUPTHER

## 2018-07-09 PROBLEM — I61.2 NONTRAUMATIC HEMORRHAGE OF RIGHT CEREBRAL HEMISPHERE (HCC): Status: ACTIVE | Noted: 2018-05-08

## 2018-08-31 ENCOUNTER — HOSPITAL ENCOUNTER (OUTPATIENT)
Dept: LAB | Age: 83
Discharge: HOME | End: 2018-08-31
Payer: MEDICARE

## 2018-08-31 ENCOUNTER — APPOINTMENT (OUTPATIENT)
Dept: INTERNAL MEDICINE CLINIC | Age: 83
End: 2018-08-31

## 2018-08-31 DIAGNOSIS — E78.5 HYPERLIPIDEMIA LDL GOAL <100: ICD-10-CM

## 2018-08-31 DIAGNOSIS — I10 ESSENTIAL HYPERTENSION: ICD-10-CM

## 2018-08-31 LAB
ALBUMIN SERPL-MCNC: 4.2 G/DL (ref 3.4–5)
ALBUMIN/GLOB SERPL: 1.4 {RATIO} (ref 0.8–1.7)
ALP SERPL-CCNC: 78 U/L (ref 45–117)
ALT SERPL-CCNC: 27 U/L (ref 16–61)
ANION GAP SERPL CALC-SCNC: 6 MMOL/L (ref 3–18)
AST SERPL-CCNC: 17 U/L (ref 15–37)
BASOPHILS # BLD: 0 K/UL (ref 0–0.1)
BASOPHILS NFR BLD: 1 % (ref 0–2)
BILIRUB SERPL-MCNC: 0.5 MG/DL (ref 0.2–1)
BUN SERPL-MCNC: 20 MG/DL (ref 7–18)
BUN/CREAT SERPL: 22 (ref 12–20)
CALCIUM SERPL-MCNC: 9.9 MG/DL (ref 8.5–10.1)
CHLORIDE SERPL-SCNC: 109 MMOL/L (ref 100–108)
CHOLEST SERPL-MCNC: 189 MG/DL
CO2 SERPL-SCNC: 27 MMOL/L (ref 21–32)
CREAT SERPL-MCNC: 0.89 MG/DL (ref 0.6–1.3)
DIFFERENTIAL METHOD BLD: ABNORMAL
EOSINOPHIL # BLD: 0.1 K/UL (ref 0–0.4)
EOSINOPHIL NFR BLD: 3 % (ref 0–5)
ERYTHROCYTE [DISTWIDTH] IN BLOOD BY AUTOMATED COUNT: 14.3 % (ref 11.6–14.5)
GLOBULIN SER CALC-MCNC: 3.1 G/DL (ref 2–4)
GLUCOSE SERPL-MCNC: 90 MG/DL (ref 74–99)
HCT VFR BLD AUTO: 39.9 % (ref 36–48)
HDLC SERPL-MCNC: 55 MG/DL (ref 40–60)
HDLC SERPL: 3.4 {RATIO} (ref 0–5)
HGB BLD-MCNC: 13.3 G/DL (ref 13–16)
LDLC SERPL CALC-MCNC: 117.2 MG/DL (ref 0–100)
LIPID PROFILE,FLP: ABNORMAL
LYMPHOCYTES # BLD: 1.3 K/UL (ref 0.9–3.6)
LYMPHOCYTES NFR BLD: 37 % (ref 21–52)
MCH RBC QN AUTO: 31.2 PG (ref 24–34)
MCHC RBC AUTO-ENTMCNC: 33.3 G/DL (ref 31–37)
MCV RBC AUTO: 93.7 FL (ref 74–97)
MONOCYTES # BLD: 0.4 K/UL (ref 0.05–1.2)
MONOCYTES NFR BLD: 12 % (ref 3–10)
NEUTS SEG # BLD: 1.7 K/UL (ref 1.8–8)
NEUTS SEG NFR BLD: 47 % (ref 40–73)
PLATELET # BLD AUTO: 141 K/UL (ref 135–420)
PMV BLD AUTO: 12.3 FL (ref 9.2–11.8)
POTASSIUM SERPL-SCNC: 4.1 MMOL/L (ref 3.5–5.5)
PROT SERPL-MCNC: 7.3 G/DL (ref 6.4–8.2)
RBC # BLD AUTO: 4.26 M/UL (ref 4.7–5.5)
SODIUM SERPL-SCNC: 142 MMOL/L (ref 136–145)
TRIGL SERPL-MCNC: 84 MG/DL (ref ?–150)
VLDLC SERPL CALC-MCNC: 16.8 MG/DL
WBC # BLD AUTO: 3.5 K/UL (ref 4.6–13.2)

## 2018-08-31 PROCEDURE — 80061 LIPID PANEL: CPT | Performed by: INTERNAL MEDICINE

## 2018-08-31 PROCEDURE — 36415 COLL VENOUS BLD VENIPUNCTURE: CPT | Performed by: INTERNAL MEDICINE

## 2018-08-31 PROCEDURE — 85025 COMPLETE CBC W/AUTO DIFF WBC: CPT | Performed by: INTERNAL MEDICINE

## 2018-08-31 PROCEDURE — 80053 COMPREHEN METABOLIC PANEL: CPT | Performed by: INTERNAL MEDICINE

## 2018-09-25 ENCOUNTER — OFFICE VISIT (OUTPATIENT)
Dept: INTERNAL MEDICINE CLINIC | Age: 83
End: 2018-09-25

## 2018-09-25 VITALS
TEMPERATURE: 98.3 F | HEART RATE: 79 BPM | OXYGEN SATURATION: 96 % | DIASTOLIC BLOOD PRESSURE: 62 MMHG | HEIGHT: 70 IN | WEIGHT: 170 LBS | SYSTOLIC BLOOD PRESSURE: 126 MMHG | RESPIRATION RATE: 16 BRPM | BODY MASS INDEX: 24.34 KG/M2

## 2018-09-25 DIAGNOSIS — I61.2 NONTRAUMATIC HEMORRHAGE OF RIGHT CEREBRAL HEMISPHERE (HCC): ICD-10-CM

## 2018-09-25 DIAGNOSIS — I25.10 ATHEROSCLEROSIS OF NATIVE CORONARY ARTERY OF NATIVE HEART WITHOUT ANGINA PECTORIS: Primary | ICD-10-CM

## 2018-09-25 DIAGNOSIS — E78.5 HYPERLIPIDEMIA LDL GOAL <100: ICD-10-CM

## 2018-09-25 DIAGNOSIS — Z23 ENCOUNTER FOR IMMUNIZATION: ICD-10-CM
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
## 2018-09-25 NOTE — MR AVS SNAPSHOT
[facility] 
 
 
 [address], [address] [place] [address] [facility] 
[phone] Patient: Ashley Munguia MRN: [id number] Visit Information Date & Time Provider Department Dept. Phone Encounter #  
 9/25/2018  3:30 PM Lucy Orellana MD [facility] [phone] Follow-up Instructions Return in about 6 months (around 3/25/2019). Your Appointments 9/25/2018  3:30 PM  
PHYSICAL with Lucy Orellana MD  
[facility] [address]) Appt Note: rsd from 9/7/18 (rm out of office) [address], [address] [place] [address]  
  
   
 [address], [address] [place] [address]  
  
    
 3/27/2019  1:30 PM  
Office Visit with Lucy Orellana MD  
[facility] [address]) Appt Note: 6 month f/u  
 [address] [facility] [address] [id number] [address]  
  
   
 [address], [address] Upcoming Health Maintenance Date Due DTaP/Tdap/Td series (1 - Tdap) 8/17/1956 Shingrix Vaccine Age 50> (1 of 2) 8/17/1987 MEDICARE YEARLY EXAM 3/14/2018 Influenza Age 5 to Adult 8/1/2018 GLAUCOMA SCREENING Q2Y 5/26/2019 Allergies as of 9/25/2018  Review Complete On: 9/25/2018 By: Lucy Orellana MD  
  
 Severity Noted Reaction Type Reactions Amoxicillin Medium 04/01/2016   Side Effect Other (comments) Felt shaky and nervous Lipitor [Atorvastatin]  06/02/2016    Myalgia Pcn [Penicillins]  03/29/2016   Side Effect Other (comments) Diaphoretic, elevates blood pressure, insomnia Pravastatin  06/02/2016    Myalgia Current Immunizations  Reviewed on 9/25/2018 Name Date Influenza High Dose Vaccine PF 3/8/2017, 11/17/2015  3:35 PM, 11/7/2013 Influenza Vaccine 1/13/2015 Influenza Vaccine (Tri) Adjuvanted  Incomplete Influenza Vaccine Split 11/14/2012  9:45 AM, 9/20/2011 Influenza Vaccine Whole 9/13/2010 Pneumococcal Conjugate (PCV-13) 3/8/2017 Pneumococcal Polysaccharide (PPSV-23) 7/2/2014  9:51 AM  
 Zoster Vaccine, Live 1/13/2015 Reviewed by José Miguel Mcdonald MD on 9/25/2018 at  2:54 PM  
 Reviewed by Jsoé Miguel Mcdonald MD on 9/25/2018 at  2:54 PM  
You Were Diagnosed With   
  
 Codes Comments Atherosclerosis of native coronary artery of native heart without angina pectoris    -  Primary ICD-10-CM: I25.10 ICD-9-CM: 414.01 Encounter for immunization     ICD-10-CM: B38 ICD-9-CM: V03.89 Hyperlipidemia LDL goal <100     ICD-10-CM: E78.5 ICD-9-CM: 272.4 Nontraumatic hemorrhage of right cerebral hemisphere [facility])     ICD-10-CM: I61.2 ICD-9-CM: 773 Vitals BP Pulse Temp Resp Height(growth percentile) Weight(growth percentile) 126/62 (BP 1 Location: Left arm, BP Patient Position: Sitting) 79 98.3 °F (36.8 °C) (Oral) 16 5' 10\" (1.778 m) 170 lb (77.1 kg) SpO2 BMI Smoking Status 96% 24.39 kg/m2 Never Smoker Vitals History BMI and BSA Data Body Mass Index Body Surface Area  
 24.39 kg/m 2 1.95 m 2 Preferred Pharmacy Pharmacy Name Phone [facility], [address] [place] [address] [phone] Your Updated Medication List  
  
   
This list is accurate as of 9/25/18  3:10 PM.  Always use your most recent med list.  
  
  
  
  
 aspirin 81 mg tablet Take 81 mg by mouth. CRESTOR 20 mg tablet Generic drug:  rosuvastatin Take 1 Tab by mouth nightly. MULTIVITAMIN PO Take  by mouth. We Performed the Following INFLUENZA VACCINE INACTIVATED (IIV), SUBUNIT, ADJUVANTED, IM [id number] CPT(R)] Follow-up Instructions Return in about 6 months (around 3/25/2019). Introducing [facility]!    
 Ratna Lara introduces ivWatch patient portal. Now you can access parts of your medical record, email your doctor's office, and request medication refills online. 1. In your internet browser, go to [email or web address] 2. Click on the First Time User? Click Here link in the Sign In box. You will see the New Member Sign Up page. 3. Enter your [employer] Access Code exactly as it appears below. You will not need to use this code after youve completed the sign-up process. If you do not sign up before the expiration date, you must request a new code. · [employer] Access Code: [email or web address] Expires: 12/24/2018  2:33 PM 
 
4. Enter the last four digits of your Social Security Number (xxxx) and Date of Birth (mm/dd/yyyy) as indicated and click Submit. You will be taken to the next sign-up page. 5. Create a [employer] ID. This will be your [employer] login ID and cannot be changed, so think of one that is secure and easy to remember. 6. Create a [employer] password. You can change your password at any time. 7. Enter your Password Reset Question and Answer. This can be used at a later time if you forget your password. 8. Enter your e-mail address. You will receive e-mail notification when new information is available in [address] 9. Click Sign Up. You can now view and download portions of your medical record. 10. Click the Download Summary menu link to download a portable copy of your medical information. If you have questions, please visit the Frequently Asked Questions section of the [employer] website. Remember, [employer] is NOT to be used for urgent needs. For medical emergencies, dial 911. Now available from your iPhone and Android! Please provide this summary of care documentation to your next provider. Your primary care clinician is listed as Campbell Avila Covert. If you have any questions after today's visit, please call [phone].

## 2018-09-25 NOTE — PROGRESS NOTES
1. Have you been to the ER, urgent care clinic or hospitalized since your last visit? YES 
      
 
2. Have you seen or consulted any other health care providers outside of the [address] [place] [address] since your last visit (Include any pap smears or colon screening)? YES Do you have an Advanced Directive? YES Would you like information on Advanced Directives?  NO

## 2019-03-27 ENCOUNTER — OFFICE VISIT (OUTPATIENT)
Dept: INTERNAL MEDICINE CLINIC | Age: 84
End: 2019-03-27

## 2019-03-27 VITALS
RESPIRATION RATE: 12 BRPM | DIASTOLIC BLOOD PRESSURE: 82 MMHG | HEART RATE: 47 BPM | SYSTOLIC BLOOD PRESSURE: 132 MMHG | HEIGHT: 70 IN | WEIGHT: 184.2 LBS | OXYGEN SATURATION: 98 % | BODY MASS INDEX: 26.37 KG/M2 | TEMPERATURE: 97.8 F

## 2019-03-27 DIAGNOSIS — I25.10 ATHEROSCLEROSIS OF NATIVE CORONARY ARTERY OF NATIVE HEART WITHOUT ANGINA PECTORIS: Primary | ICD-10-CM

## 2019-03-27 DIAGNOSIS — E78.5 HYPERLIPIDEMIA LDL GOAL <100: ICD-10-CM

## 2019-03-27 DIAGNOSIS — K21.00 REFLUX ESOPHAGITIS: ICD-10-CM

## 2019-03-27 NOTE — PROGRESS NOTES
Chief Complaint Patient presents with  Cholesterol Problem 6 month follow up    ROOM   9  
 
 
1. Have you been to the ER, urgent care clinic since your last visit? Hospitalized since your last visit? Yes When: 12-21-18 [facility] Reason: Edema, and Dyspnea on Exertion, New Onset Atrial Fibrillation. 2. Have you seen or consulted any other health care providers outside of the [address] [place] [address] since your last visit? Include any pap smears or colon screening. No 
 
Patient was given a copy of the Advanced Directive and understands to bring it in once completed. Health Maintenance Due Topic Date Due  
 DTaP/Tdap/Td series (1 - Tdap) 08/17/1958  Shingrix Vaccine Age 50> (1 of 2) 08/17/1987  MEDICARE YEARLY EXAM  03/14/2018  GLAUCOMA SCREENING Q2Y  05/26/2019

## 2019-03-27 NOTE — PATIENT INSTRUCTIONS
Patient was given a copy of the Advanced Medical Directive Form, and understands to bring it in once completed. Health Maintenance Due Topic Date Due  
 DTaP/Tdap/Td series (1 - Tdap) 08/17/1958  Shingrix Vaccine Age 50> (1 of 2) 08/17/1987  MEDICARE YEARLY EXAM  03/14/2018  GLAUCOMA SCREENING Q2Y  05/26/2019

## 2019-03-27 NOTE — PROGRESS NOTES
Toribio Cockayne 1935, is a 82 y.o. male, who is seen today for reevaluation of atherosclerotic heart disease lipidemia hypertension brain injury. He is back working full-time and feeling great. He is having no chest pain or dyspnea. He takes his medicine regularly. No more falls or injuries. Past Medical History:  
Diagnosis Date  BPH  CABG   
 9/10   LIMA - LAD/D1,  SVG - OM,  SVG - PDA  Coronary artery disease  Dyslipidemia  GERD  Nontraumatic hemorrhage of right cerebral hemisphere ([facility] [address] 5/8/2018  Peptic ulcer disease  Stroke ([facility] 06/01/2018 Current Outpatient Medications Medication Sig Dispense Refill  CRESTOR 20 mg tablet Take 1 Tab by mouth nightly. 90 Tab 3  
 aspirin 81 mg tablet Take 81 mg by mouth daily.  MULTIVITAMIN PO Take  by mouth daily. Visit Vitals /82 (BP 1 Location: Left arm, BP Patient Position: Sitting) Pulse (!) 47 Temp 97.8 °F (36.6 °C) (Oral) Resp 12 Ht 5' 10\" (1.778 m) Wt 184 lb 3.2 oz (83.6 kg) SpO2 98% BMI 26.43 kg/m² Carotids are 2+ without bruits. Lungs are clear to percussion. Good breath sounds with no wheezing or crackles. Heart reveals a regular rhythm with occasional extrasystole. No murmur gallop click or rub. Apical impulse is not palpable. Abdomen is soft and nontender with no hepatosplenomegaly or masses and no bruits. Extremities reveal no clubbing cyanosis or edema. Pulses are 2+. Results for orders placed or performed during the hospital encounter of 08/31/18 CBC WITH AUTOMATED DIFF Result Value Ref Range WBC 3.5 (L) 4.6 - 13.2 K/uL  
 RBC 4.26 (L) 4.70 - 5.50 M/uL  
 HGB 13.3 13.0 - 16.0 g/dL HCT 39.9 36.0 - 48.0 % MCV 93.7 74.0 - 97.0 FL  
 MCH 31.2 24.0 - 34.0 PG  
 MCHC 33.3 31.0 - 37.0 g/dL  
 RDW 14.3 11.6 - 14.5 % PLATELET 437 162 - 178 K/uL MPV 12.3 (H) 9.2 - 11.8 FL  
 NEUTROPHILS 47 40 - 73 % LYMPHOCYTES 37 21 - 52 % MONOCYTES 12 (H) 3 - 10 % EOSINOPHILS 3 0 - 5 % BASOPHILS 1 0 - 2 %  
 ABS. NEUTROPHILS 1.7 (L) 1.8 - 8.0 K/UL  
 ABS. LYMPHOCYTES 1.3 0.9 - 3.6 K/UL  
 ABS. MONOCYTES 0.4 0.05 - 1.2 K/UL  
 ABS. EOSINOPHILS 0.1 0.0 - 0.4 K/UL  
 ABS. BASOPHILS 0.0 0.0 - 0.1 K/UL  
 DF AUTOMATED    
LIPID PANEL Result Value Ref Range LIPID PROFILE Cholesterol, total 189 <200 MG/DL Triglyceride 84 <150 MG/DL  
 HDL Cholesterol 55 40 - 60 MG/DL  
 LDL, calculated 117.2 (H) 0 - 100 MG/DL VLDL, calculated 16.8 MG/DL  
 CHOL/HDL Ratio 3.4 0 - 5.0 METABOLIC PANEL, COMPREHENSIVE Result Value Ref Range Sodium 142 136 - 145 mmol/L Potassium 4.1 3.5 - 5.5 mmol/L Chloride 109 (H) 100 - 108 mmol/L  
 CO2 27 21 - 32 mmol/L Anion gap 6 3.0 - 18 mmol/L Glucose 90 74 - 99 mg/dL BUN 20 (H) 7.0 - 18 MG/DL Creatinine 0.89 0.6 - 1.3 MG/DL  
 BUN/Creatinine ratio 22 (H) 12 - 20 GFR est AA >60 >60 ml/min/1.73m2 GFR est non-AA >60 >60 ml/min/1.73m2 Calcium 9.9 8.5 - 10.1 MG/DL Bilirubin, total 0.5 0.2 - 1.0 MG/DL  
 ALT (SGPT) 27 16 - 61 U/L  
 AST (SGOT) 17 15 - 37 U/L Alk. phosphatase 78 45 - 117 U/L Protein, total 7.3 6.4 - 8.2 g/dL Albumin 4.2 3.4 - 5.0 g/dL Globulin 3.1 2.0 - 4.0 g/dL A-G Ratio 1.4 0.8 - 1.7 Assessment: #1. ASHD asymptomatic, he will continue aspirin 81 mg daily. #2. Hyperlipidemia has been doing well, he will continue simvastatin 20 mg daily check lipids in 6 months. #3.  History of hypertension doing well, continue no added salt diet. #4.  Brain injury last year doing well now, continue with current workload as long as he feels up to it. Follow-up 6 months with lab Rodolfo Ta Fillers, [address] Please note: This document has been produced using voice recognition software. Unrecognized errors in transcription may be present.

## 2019-06-03 RX ORDER — ROSUVASTATIN CALCIUM 20 MG/1
20 TABLET, FILM COATED ORAL
Qty: 90 TAB | Refills: 3 | Status: SHIPPED | OUTPATIENT
Start: 2019-06-03 | End: 2020-09-28 | Stop reason: SDUPTHER

## 2019-06-03 NOTE — TELEPHONE ENCOUNTER
Last Visit: 3/27/19 with MD Darren Leblanc  Next Appointment: 10/8/19 with MD Darren Leblanc  Previous Refill Encounter(s): 5/23/18 #90 with 3 refills    Requested Prescriptions     Pending Prescriptions Disp Refills    CRESTOR 20 mg tablet 90 Tab 3     Sig: Take 1 Tab by mouth nightly.

## 2019-09-03 ENCOUNTER — TELEPHONE (OUTPATIENT)
Dept: INTERNAL MEDICINE CLINIC | Age: 84
End: 2019-09-03

## 2019-09-03 NOTE — TELEPHONE ENCOUNTER
Pt calling, says he had a gallbladder attack. Hasn't had a bowel movement since Sunday. Wants to know if he can take a laxative.  He had gone to [place] over the weekend and is seeing Dr. Kade Trejo tomorrow for that

## 2019-09-10 ENCOUNTER — PATIENT OUTREACH (OUTPATIENT)
Dept: INTERNAL MEDICINE CLINIC | Age: 84
End: 2019-09-10

## 2019-09-10 RX ORDER — HYDROCODONE BITARTRATE AND ACETAMINOPHEN 5; 325 MG/1; MG/1
1 TABLET ORAL
COMMUNITY
Start: 2019-09-06 | End: 2019-09-13 | Stop reason: ALTCHOICE

## 2019-09-10 RX ORDER — ONDANSETRON 4 MG/1
4 TABLET, ORALLY DISINTEGRATING ORAL
COMMUNITY
Start: 2019-09-02 | End: 2021-12-05

## 2019-09-10 NOTE — PROGRESS NOTES
Hospital Discharge Follow-Up      Date/Time:  9/10/2019 2:05 PM    Patient was admitted to [facility] on 9/3/19 and discharged on 19 for gangrenous cholecystitis. The physician discharge summary was available at the time of outreach. Patient was contacted within 2 business days of discharge. Top Challenges reviewed with the provider   None     Method of communication with provider :chart routing    Inpatient RRAT score: not calculated  Was this a readmission? no   Patient stated reason for the readmission: N/A    Care Transitions Nurse (CTN) contacted the patient by telephone to perform post hospital discharge assessment. Verified name and  with patient as identifiers. Provided introduction to self, and explanation of the CTN role. Patient reported he is doing well and has been walking more everyday. Patient reported 4 incisions to stomach and one where the drain was. Patient also reported he is doing home exercises and is able to go over 2500 on his ICS. Patient denied pain, N/V. tawannaetn reported drain has been removed and he has been changes bandage every day. Patient stated, Mertie Sheila is not much drainage at all'. Patient reported drainage is orange in color and there is no blood. Has been passing gas and had a normal BM yesterday. Denied diarrhea or constipation. Daughter helping to care for wife while patient is recovering. Reviewed discharge instructions and red flags with patient who verbalized understanding. Patient given an opportunity to ask questions and does not have any further questions or concerns at this time. The patient agrees to contact the PCP office for questions related to their healthcare. NN provided contact information for future reference.     Disease Specific:   N/A    Summary of patient's top problems: N/A    Home Health orders at discharge: [address]: N/A  Date of initial visit: [address] ordered/company: N/A  Durable Medical Equipment received: N/A    Barriers to care? None at this time. Advance Care Planning:   Does patient have an Advance Directive:  not on file     Medication(s):   New Medications at Discharge: None  Changed Medications at Discharge: Mami Cantu  Discontinued Medications at Discharge: None    Medication reconciliation was performed with patient, who verbalizes understanding of administration of home medications. There were no barriers to obtaining medications identified at this time. Referral to Pharm D needed: no     Current Outpatient Medications   Medication Sig    CHOLECALCIFEROL, VITAMIN D3, PO Take 5,000 Units by mouth.  VITAMIN B COMPLEX PO Take  by mouth.  CRESTOR 20 mg tablet Take 1 Tab by mouth nightly.  aspirin 81 mg tablet Take 81 mg by mouth daily.  MULTIVITAMIN PO Take  by mouth daily.  HYDROcodone-acetaminophen (NORCO) 5-325 mg per tablet Take 1 Tab by mouth.  ondansetron (ZOFRAN ODT) 4 mg disintegrating tablet Take 4 mg by mouth. No current facility-administered medications for this visit. There are no discontinued medications. [facility] follow up appointment(s):   Future Appointments   Date Time Provider Rosalee Bangura   9/13/2019  2:30 PM Estephania Donato MD [address]   9/24/2019  9:15 AM [facility] NURSE VISIT [facility]   10/8/2019 10:30 AM Estephania Donato MD [address]      Non-[facility] follow up appointment(s): Dr. Elma Weiss, surgical follow up 9/11/19 at 2:45 PM  [facility]:  out of service area       Goals      Prevent complications post hospitalization. Plan of Care:   Ensure provider appt is scheduled within 7 days post-discharge; 9/10: Patient aware of PCP appt on 9/13. Also has surgical f/u appt on 9/11.     Confirm patient attended post-discharge provider appt   Complete post-visit call to confirm attendance and update care needs    Review/educate common or potential \"red flags\" of condition worsening; 9/10: Patient aware of s/s of infection to monitor and report  Evaluate adherence to medications and priority barriers to resolve      Instruct on adherence to medications as ordered and assess for therapeutic response and side-effects       Monitor lab results and symptoms, escalate to provider    Assess for health risk behaviors and educate patient/caregivers on reducing risk  Instruct and encourage spirometry if ordered; 9/10: Patient going over 2500 on ICS      Discuss and provide resources for ACP

## 2019-09-13 ENCOUNTER — OFFICE VISIT (OUTPATIENT)
Dept: INTERNAL MEDICINE CLINIC | Age: 84
End: 2019-09-13

## 2019-09-13 ENCOUNTER — PATIENT OUTREACH (OUTPATIENT)
Dept: INTERNAL MEDICINE CLINIC | Age: 84
End: 2019-09-13

## 2019-09-13 VITALS
OXYGEN SATURATION: 97 % | SYSTOLIC BLOOD PRESSURE: 132 MMHG | BODY MASS INDEX: 24.34 KG/M2 | WEIGHT: 170 LBS | TEMPERATURE: 97.3 F | RESPIRATION RATE: 14 BRPM | HEIGHT: 70 IN | DIASTOLIC BLOOD PRESSURE: 84 MMHG | HEART RATE: 53 BPM

## 2019-09-13 DIAGNOSIS — K81.0 ACUTE CHOLECYSTITIS: Primary | ICD-10-CM

## 2019-09-13 RX ORDER — FUROSEMIDE 20 MG/1
TABLET ORAL DAILY
COMMUNITY
End: 2021-07-27

## 2019-09-13 NOTE — PROGRESS NOTES
Mary Montana 1935, is a 82 y.o. male, who is seen today for reevaluation after recent hospitalization for gallbladder surgery. He was discharged September 6 and contacted 2 business days later by Agustina Curry our nurse on September 10. He covered his hospitalization and recommendations and meds insulin. He reports that he had developed rather marked upper abdominal pain and went to the emergency room and was checked over and sent home but a few hours later he went back with more pain and was found to have cholecystitis. At the time of surgery he reportedly had a necrotic gallbladder. Since surgery he used low-dose Norco for a day and a half but none since and is eating well, was able to bend forward and cut his toenails today and feels close to back to normal but has been instructed not to do any lifting. He is having no chest pain or dyspnea. No fever chills or sweats. Past Medical History:   Diagnosis Date    BPH     CABG     9/10   LIMA - LAD/D1,  SVG - OM,  SVG - PDA    Coronary artery disease     Dyslipidemia     GERD     Nontraumatic hemorrhage of right cerebral hemisphere ([facility] [address] 5/8/2018    Peptic ulcer disease     Stroke ([facility] [address] 06/01/2018     Current Outpatient Medications   Medication Sig Dispense Refill    furosemide (LASIX) 20 mg tablet Take  by mouth daily.  CHOLECALCIFEROL, VITAMIN D3, PO Take 5,000 Units by mouth.  VITAMIN B COMPLEX PO Take  by mouth.  ondansetron (ZOFRAN ODT) 4 mg disintegrating tablet Take 4 mg by mouth.  CRESTOR 20 mg tablet Take 1 Tab by mouth nightly. 90 Tab 3    aspirin 81 mg tablet Take 81 mg by mouth daily.  MULTIVITAMIN PO Take  by mouth daily. Visit Vitals  /84   Pulse (!) 53   Temp 97.3 °F (36.3 °C) (Oral)   Resp 14   Ht 5' 10\" (1.778 m)   Wt 170 lb (77.1 kg)   SpO2 97%   BMI 24.39 kg/m²     Lungs are clear to percussion. Good breath sounds with no wheezing or crackles.   Heart reveals a regular rhythm with normal S1 and S2 no murmur gallop click or rub. Apical impulse is not palpable. He has a dressing over the right upper quadrant and small Steri-Strips over incisions elsewhere in the upper abdomen. There is no distention or tympany. Extremities reveal no clubbing cyanosis or edema. Assessment: #1. Doing well after recent cholecystectomy. Will continue current medication, he is eating well, daughter is helping him with his surgical dressing, and he has a follow-up appointment next week with his surgeon. Continue to avoid any lifting. Rodolfo Ravi MD FACP    Please note: This document has been produced using voice recognition software. Unrecognized errors in transcription may be present.

## 2019-09-13 NOTE — PROGRESS NOTES
Marzena Jevoningrid presents today for   Chief Complaint   Patient presents with    Transitions Of Care     actute cholecystitis               Depression Screening:  3 most recent PHQ Screens 3/27/2019   Little interest or pleasure in doing things Not at all   Feeling down, depressed, irritable, or hopeless Not at all   Total Score PHQ 2 0       Learning Assessment:  Learning Assessment 3/27/2019   PRIMARY LEARNER Patient   HIGHEST LEVEL OF EDUCATION - PRIMARY LEARNER  SOME COLLEGE   BARRIERS PRIMARY LEARNER NONE   CO-LEARNER CAREGIVER No   PRIMARY LANGUAGE ENGLISH   LEARNER PREFERENCE PRIMARY DEMONSTRATION   ANSWERED BY patient   RELATIONSHIP SELF       Abuse Screening:  Abuse Screening Questionnaire 3/27/2019   Do you ever feel afraid of your partner? N   Are you in a relationship with someone who physically or mentally threatens you? N   Is it safe for you to go home? Y       Fall Risk  Fall Risk Assessment, last 12 mths 3/27/2019   Able to walk? Yes   Fall in past 12 months? No           Coordination of Care:  1. Have you been to the ER, urgent care clinic since your last visit? Hospitalized since your last visit? Yes 9/3/19 [facility]    2. Have you seen or consulted any other health care providers outside of the [address] [place] [address] since your last visit? Include any pap smears or colon screening.  no

## 2019-09-13 NOTE — PROGRESS NOTES
Patient attended scheduled transition of care appointment with PCP on 9/13/19. Care Transitions Nurse will continue to follow.

## 2019-09-20 ENCOUNTER — PATIENT OUTREACH (OUTPATIENT)
Dept: INTERNAL MEDICINE CLINIC | Age: 84
End: 2019-09-20

## 2019-09-20 NOTE — PROGRESS NOTES
Transitions of Care Coordination  Follow-Up    Date/Time:  9/20/2019 1:42 PM     CTN (Care Transitions Nurse) contacted patient for Transitions of Care Coordination  follow up. Spoke to patient. Introduced self/role and reason for call. Patient reported:   Seen by surgeon today  Removed all dressings  Doing good     Medications:   New medications since last outreach: no  Does patient need refills on any medications: no  Medication changes since last outreach (dose adjustments or discontinued meds): no    Home Health company: N/A  Date of discharge: N/A    Barriers to care? None at this time    Specialist appointments since last outreach? yes  If so, specialist and date: Dr. Patricia Breen, surgical follow up 9/20/19    Patient verbalized he was told not to do any heavy lifting or sit-up for another 2 weeks. Patient reported he is doing fine and was just in the yard picking tomatoes. Denied any needs or concerns at this time. Patient reminded that there are physicians on call 24 hours a day / 7 days a week (M-F 5pm to 8am and from Friday 5pm until Monday 8a for the weekend) should the patient have questions or concerns. Future Appointments   Date Time Provider Rosalee Bangura   9/24/2019  9:15 AM [facility] NURSE VISIT [facility] SHAY SCHED   10/8/2019 10:30 AM Monique Hartley MD [facility] SCHED        Other upcoming appointments:  N/A    Goals      Prevent complications post hospitalization. Plan of Care:   Ensure provider appt is scheduled within 7 days post-discharge; 9/10: Patient aware of PCP appt on 9/13. Also has surgical f/u appt on 9/11. Confirm patient attended post-discharge provider appt; 9/13: Patient attended transition of care appt today as scheduled. 9/20: Patient attended surgical appt today. Complete post-visit call to confirm attendance and update care needs;  Completed    Review/educate common or potential \"red flags\" of condition worsening; 9/10: Patient aware of s/s of infection to monitor and report  Assess for health risk behaviors and educate patient/caregivers on reducing risk  Instruct and encourage spirometry if ordered; 9/10: Patient going over 2500 on ICS Discuss and provide resources for ACP

## 2019-10-07 ENCOUNTER — PATIENT OUTREACH (OUTPATIENT)
Dept: INTERNAL MEDICINE CLINIC | Age: 84
End: 2019-10-07

## 2019-10-07 NOTE — PROGRESS NOTES
Patient has graduated from the Transitions of Care Coordination  program on 10/7/19. Patient's symptoms are stable at this time. Patient/family has the ability to self-manage. Care management goals have been completed at this time. No further nurse navigator follow up scheduled. Goals Addressed                 This Visit's Progress     COMPLETED: Prevent complications post hospitalization. Plan of Care:   Ensure provider appt is scheduled within 7 days post-discharge; 9/10: Patient aware of PCP appt on 9/13. Also has surgical f/u appt on 9/11. Confirm patient attended post-discharge provider appt; 9/13: Patient attended transition of care appt today as scheduled. 9/20: Patient attended surgical appt today. Complete post-visit call to confirm attendance and update care needs; Completed    Review/educate common or potential \"red flags\" of condition worsening; 9/10: Patient aware of s/s of infection to monitor and report  Assess for health risk behaviors and educate patient/caregivers on reducing risk  Instruct and encourage spirometry if ordered; 9/10: Patient going over 2500 on ICS                      Pt has nurse navigator's contact information for any further questions, concerns, or needs.   Patients upcoming visits:    Future Appointments   Date Time Provider Rosalee Bangura   10/8/2019 10:30 AM Washington Eastman MD [facility]

## 2019-10-08 ENCOUNTER — OFFICE VISIT (OUTPATIENT)
Dept: INTERNAL MEDICINE CLINIC | Age: 84
End: 2019-10-08

## 2019-10-08 VITALS
BODY MASS INDEX: 24.77 KG/M2 | RESPIRATION RATE: 14 BRPM | DIASTOLIC BLOOD PRESSURE: 60 MMHG | TEMPERATURE: 98.1 F | WEIGHT: 173 LBS | HEART RATE: 70 BPM | OXYGEN SATURATION: 97 % | HEIGHT: 70 IN | SYSTOLIC BLOOD PRESSURE: 124 MMHG

## 2019-10-08 DIAGNOSIS — Z23 ENCOUNTER FOR IMMUNIZATION: ICD-10-CM

## 2019-10-08 DIAGNOSIS — I25.10 ATHEROSCLEROSIS OF NATIVE CORONARY ARTERY OF NATIVE HEART WITHOUT ANGINA PECTORIS: Primary | ICD-10-CM

## 2019-10-08 DIAGNOSIS — K21.00 REFLUX ESOPHAGITIS: ICD-10-CM

## 2019-10-08 DIAGNOSIS — E78.5 HYPERLIPIDEMIA LDL GOAL <100: ICD-10-CM

## 2019-10-08 NOTE — PROGRESS NOTES
Alda Dejesus 1935, is a 82 y.o. male, who is seen today for reevaluation of atherosclerotic heart disease, hyperlipidemia, hypertension, and recent cholecystectomy. He is eating normally again and bowels are working well. No abdominal pain. He is starting to do more walking and exercise, hopes to get back to where he was before emergency cholecystectomy. He does take his medicine correctly. He has had no chest pain since bypass surgery. Past Medical History:   Diagnosis Date    BPH     CABG     9/10   LIMA - LAD/D1,  SVG - OM,  SVG - PDA    Coronary artery disease     Dyslipidemia     GERD     Nontraumatic hemorrhage of right cerebral hemisphere ([facility] 5/8/2018    Peptic ulcer disease     Stroke ([facility] 06/01/2018     Past Surgical History:   Procedure Laterality Date    HX APPENDECTOMY      HX CHOLECYSTECTOMY  09/03/2019    SOH    HX CORONARY ARTERY BYPASS GRAFT      9/10   LIMA - LAD/D1,  SVG - OM,  SVG - PDA    HX HERNIA REPAIR      1974     Current Outpatient Medications   Medication Sig Dispense Refill    furosemide (LASIX) 20 mg tablet Take  by mouth daily.  CHOLECALCIFEROL, VITAMIN D3, PO Take 5,000 Units by mouth.  VITAMIN B COMPLEX PO Take  by mouth.  ondansetron (ZOFRAN ODT) 4 mg disintegrating tablet Take 4 mg by mouth.  CRESTOR 20 mg tablet Take 1 Tab by mouth nightly. 90 Tab 3    aspirin 81 mg tablet Take 81 mg by mouth daily.  MULTIVITAMIN PO Take  by mouth daily.        Allergies   Allergen Reactions    Atorvastatin Myalgia and Other (comments)     Myalgia    Pcn [Penicillins] Other (comments)     Diaphoretic, elevates blood pressure, insomnia    Pravastatin Myalgia and Other (comments)     Myalgia    Amoxicillin Other (comments)     Felt shaky and nervous    Rosuvastatin Other (comments)     Can only take name brand CRESTOR     Social History     Socioeconomic History    Marital status:      Spouse name: Not on file    Number of children: Not on file    Years of education: Not on file    Highest education level: Not on file   Tobacco Use    Smoking status: Never Smoker    Smokeless tobacco: Never Used   Substance and Sexual Activity    Alcohol use: No    Drug use: No    Sexual activity: Yes     Partners: Female     Birth control/protection: None     Visit Vitals  /60 (BP 1 Location: Left arm, BP Patient Position: Sitting)   Pulse 70   Temp 98.1 °F (36.7 °C) (Oral)   Resp 14   Ht 5' 10\" (1.778 m)   Wt 173 lb (78.5 kg)   SpO2 97%   BMI 24.82 kg/m²     Ear canals and tympanic membranes appear normal with good light reflex bilaterally. Oral cavity reveals no lesions. Neck reveals no adenopathy or thyromegaly. Carotids are 2+ without bruits. Heart reveals a regular rhythm with normal S1 and S2 no murmur gallop click or rub. Apical impulse is not palpable. There is a well-healed median sternotomy scar. Abdomen is soft and nontender with no hepatosplenomegaly or masses and no bruits. Extremities reveal no clubbing cyanosis or edema. Pulses are 2+ except 1+ dorsalis pedis pulses. No suspicious skin growths. Recent CBC and chemistries done at the time of cholecystectomy are unremarkable, I do not think he needs any more lab right now including lipids which have been doing well. Assessment: #1. Recent cholecystectomy healing well with the incisions looking great and he is feeling well enough to start exercising again. #2.  ASHD asymptomatic since bypass surgery several years ago, he will continue aspirin 81 mg daily. #3.  Remote history of hypertension no longer an issue, he will continue healthy diet with no added salt. #4. Hyperlipidemia has been doing very well, he will continue rosuvastatin 20 mg daily. #5.  History of GERD currently asymptomatic, medication is no longer needed. We will give him a flu shot now and follow-up will be in 6 months with lab    Via Ana Pichardo MD FACP    Please note:   This document has been produced using voice recognition software. Unrecognized errors in transcription may be present.

## 2019-10-08 NOTE — PATIENT INSTRUCTIONS
Vaccine Information Statement Influenza (Flu) Vaccine (Inactivated or Recombinant): What You Need to Know Many Vaccine Information Statements are available in Irish and other languages. See www.immunize.org/vis Hojas de información sobre vacunas están disponibles en español y en muchos otros idiomas. Visite www.immunize.org/vis 1. Why get vaccinated? Influenza vaccine can prevent influenza (flu). Flu is a contagious disease that spreads around the [facility] every year, usually between October and May. Anyone can get the flu, but it is more dangerous for some people. Infants and young children, people 72years of age and older, pregnant women, and people with certain health conditions or a weakened immune system are at greatest risk of flu complications. Pneumonia, bronchitis, sinus infections and ear infections are examples of flu-related complications. If you have a medical condition, such as heart disease, cancer or diabetes, flu can make it worse. Flu can cause fever and chills, sore throat, muscle aches, fatigue, cough, headache, and runny or stuffy nose. Some people may have vomiting and diarrhea, though this is more common in children than adults. Each year thousands of people in the [facility] die from flu, and many more are hospitalized. Flu vaccine prevents millions of illnesses and flu-related visits to the doctor each year. 2. Influenza vaccines CDC recommends everyone 10months of age and older get vaccinated every flu season. Children 6 months through 6years of age may need 2 doses during a single flu season. Everyone else needs only 1 dose each flu season. It takes about 2 weeks for protection to develop after vaccination. There are many flu viruses, and they are always changing. Each year a new flu vaccine is made to protect against three or four viruses that are likely to cause disease in the upcoming flu season.  Even when the vaccine doesnt exactly match these viruses, it may still provide some protection. Influenza vaccine does not cause flu. Influenza vaccine may be given at the same time as other vaccines. 3. Talk with your health care provider Tell your vaccine provider if the person getting the vaccine: 
 Has had an allergic reaction after a previous dose of influenza vaccine, or has any severe, life-threatening allergies.  Has ever had Guillain-Barré Syndrome (also called GBS). In some cases, your health care provider may decide to postpone influenza vaccination to a future visit. People with minor illnesses, such as a cold, may be vaccinated. People who are moderately or severely ill should usually wait until they recover before getting influenza vaccine. Your health care provider can give you more information. 4. Risks of a reaction  Soreness, redness, and swelling where shot is given, fever, muscle aches, and headache can happen after influenza vaccine.  There may be a very small increased risk of Guillain-Barré Syndrome (GBS) after inactivated influenza vaccine (the flu shot). Yoana Moyon children who get the flu shot along with pneumococcal vaccine (PCV13), and/or DTaP vaccine at the same time might be slightly more likely to have a seizure caused by fever. Tell your health care provider if a child who is getting flu vaccine has ever had a seizure. People sometimes faint after medical procedures, including vaccination. Tell your provider if you feel dizzy or have vision changes or ringing in the ears. As with any medicine, there is a very remote chance of a vaccine causing a severe allergic reaction, other serious injury, or death. 5. What if there is a serious problem? An allergic reaction could occur after the vaccinated person leaves the clinic.  If you see signs of a severe allergic reaction (hives, swelling of the face and throat, difficulty breathing, a fast heartbeat, dizziness, or weakness), call 9-1-1 and get the person to the nearest hospital. 
 
For other signs that concern you, call your health care provider. Adverse reactions should be reported to the Vaccine Adverse Event Reporting System (VAERS). Your health care provider will usually file this report, or you can do it yourself. Visit the VAERS website at www.vaers. hhs.gov or call 0-594.293.7799. VAERS is only for reporting reactions, and VAERS staff do not give medical advice. 6. The National Vaccine Injury Compensation Program 
 
The [facility] Vaccine Injury Compensation Program (VICP) is a federal program that was created to compensate people who may have been injured by certain vaccines. Visit the VICP website at www.[facility].gov/vaccinecompensation or call 8-139.460.6217 to learn about the program and about filing a claim. There is a time limit to file a claim for compensation. 7. How can I learn more?  Ask your health care provider.  Call your local or state health department.  Contact the Centers for Disease Control and Prevention (CDC): 
- Call 4-183.291.3492 (1-800-CDC-INFO) or 
- Visit CDCs influenza website at www.cdc.gov/flu Vaccine Information Statement (Interim) Inactivated Influenza Vaccine 8/15/2019 
[address] Preciado 781BC-06 Department of Health and [employer] Centers for Disease Control and Prevention Office Use Only

## 2020-06-16 ENCOUNTER — HOSPITAL ENCOUNTER (OUTPATIENT)
Dept: LAB | Age: 85
Discharge: HOME | End: 2020-06-16
Payer: MEDICARE

## 2020-06-16 ENCOUNTER — APPOINTMENT (OUTPATIENT)
Dept: INTERNAL MEDICINE CLINIC | Age: 85
End: 2020-06-16

## 2020-06-16 DIAGNOSIS — E78.5 HYPERLIPIDEMIA LDL GOAL <100: ICD-10-CM

## 2020-06-16 LAB
ALBUMIN SERPL-MCNC: 4.2 G/DL (ref 3.4–5)
ALBUMIN/GLOB SERPL: 1.3 {RATIO} (ref 0.8–1.7)
ALP SERPL-CCNC: 104 U/L (ref 45–117)
ALT SERPL-CCNC: 35 U/L (ref 16–61)
ANION GAP SERPL CALC-SCNC: 4 MMOL/L (ref 3–18)
AST SERPL-CCNC: 19 U/L (ref 10–38)
BILIRUB SERPL-MCNC: 0.4 MG/DL (ref 0.2–1)
BUN SERPL-MCNC: 24 MG/DL (ref 7–18)
BUN/CREAT SERPL: 26 (ref 12–20)
CALCIUM SERPL-MCNC: 10 MG/DL (ref 8.5–10.1)
CHLORIDE SERPL-SCNC: 110 MMOL/L (ref 100–111)
CHOLEST SERPL-MCNC: 151 MG/DL
CO2 SERPL-SCNC: 28 MMOL/L (ref 21–32)
CREAT SERPL-MCNC: 0.91 MG/DL (ref 0.6–1.3)
GLOBULIN SER CALC-MCNC: 3.2 G/DL (ref 2–4)
GLUCOSE SERPL-MCNC: 76 MG/DL (ref 74–99)
HDLC SERPL-MCNC: 57 MG/DL (ref 40–60)
HDLC SERPL: 2.6 {RATIO} (ref 0–5)
LDLC SERPL CALC-MCNC: 81.6 MG/DL (ref 0–100)
LIPID PROFILE,FLP: NORMAL
POTASSIUM SERPL-SCNC: 4.6 MMOL/L (ref 3.5–5.5)
PROT SERPL-MCNC: 7.4 G/DL (ref 6.4–8.2)
SODIUM SERPL-SCNC: 142 MMOL/L (ref 136–145)
TRIGL SERPL-MCNC: 62 MG/DL (ref ?–150)
VLDLC SERPL CALC-MCNC: 12.4 MG/DL

## 2020-06-16 PROCEDURE — 36415 COLL VENOUS BLD VENIPUNCTURE: CPT

## 2020-06-16 PROCEDURE — 80053 COMPREHEN METABOLIC PANEL: CPT

## 2020-06-16 PROCEDURE — 80061 LIPID PANEL: CPT

## 2020-06-23 ENCOUNTER — OFFICE VISIT (OUTPATIENT)
Dept: INTERNAL MEDICINE CLINIC | Age: 85
End: 2020-06-23

## 2020-06-23 VITALS
TEMPERATURE: 97.8 F | SYSTOLIC BLOOD PRESSURE: 124 MMHG | HEART RATE: 72 BPM | HEIGHT: 70 IN | RESPIRATION RATE: 14 BRPM | OXYGEN SATURATION: 96 % | BODY MASS INDEX: 24.14 KG/M2 | DIASTOLIC BLOOD PRESSURE: 66 MMHG | WEIGHT: 168.6 LBS

## 2020-06-23 DIAGNOSIS — E78.5 HYPERLIPIDEMIA LDL GOAL <100: ICD-10-CM

## 2020-06-23 DIAGNOSIS — I25.10 ATHEROSCLEROSIS OF NATIVE CORONARY ARTERY OF NATIVE HEART WITHOUT ANGINA PECTORIS: Primary | ICD-10-CM

## 2020-06-23 DIAGNOSIS — Z86.73 HISTORY OF STROKE: ICD-10-CM

## 2020-06-23 NOTE — PROGRESS NOTES
Galileo Metz 1935, is a 82 y.o. male, who is seen today for reevaluation of atherosclerotic heart disease, hyperlipidemia and history of stroke. He feels well and is staying in the house almost all the time because of coronavirus. His children are helping out so he can stay home. He has had no chest pain since bypass surgery in September 2010. He eats a healthy diet and takes Crestor regularly, he says he had very high blood pressure on at least 2 occasions when he used generic Crestor. Past Medical History:   Diagnosis Date    BPH     CABG     9/10   LIMA - LAD/D1,  SVG - OM,  SVG - PDA    Coronary artery disease     Dyslipidemia     GERD     Nontraumatic hemorrhage of right cerebral hemisphere ([facility] [address] 5/8/2018    Peptic ulcer disease     Stroke ([facility] 06/01/2018     Current Outpatient Medications   Medication Sig Dispense Refill    furosemide (LASIX) 20 mg tablet Take  by mouth daily.  CHOLECALCIFEROL, VITAMIN D3, PO Take 5,000 Units by mouth.  VITAMIN B COMPLEX PO Take  by mouth.  ondansetron (ZOFRAN ODT) 4 mg disintegrating tablet Take 4 mg by mouth.  CRESTOR 20 mg tablet Take 1 Tab by mouth nightly. 90 Tab 3    aspirin 81 mg tablet Take 81 mg by mouth daily.  MULTIVITAMIN PO Take  by mouth daily. Visit Vitals  /66 (BP 1 Location: Right arm, BP Patient Position: Sitting)   Pulse 72   Temp 97.8 °F (36.6 °C) (Oral)   Resp 14   Ht 5' 10\" (1.778 m)   Wt 168 lb 9.6 oz (76.5 kg)   SpO2 96%   BMI 24.19 kg/m²     Carotids are 2+ without bruits. Lungs are clear to percussion. Good breath sounds with no wheezing or crackles. Heart reveals a regular rhythm with normal S1 and S2 no rub. Apical impulse is not palpable. Abdomen is soft and nontender with no hepatosplenomegaly or masses and no bruits. Extremities reveal no clubbing cyanosis or edema. Pulses are 2+.     Results for orders placed or performed during the hospital encounter of 06/16/20   LIPID PANEL   Result Value Ref Range    LIPID PROFILE          Cholesterol, total 151 <200 MG/DL    Triglyceride 62 <150 MG/DL    HDL Cholesterol 57 40 - 60 MG/DL    LDL, calculated 81.6 0 - 100 MG/DL    VLDL, calculated 12.4 MG/DL    CHOL/HDL Ratio 2.6 0 - 5.0     METABOLIC PANEL, COMPREHENSIVE   Result Value Ref Range    Sodium 142 136 - 145 mmol/L    Potassium 4.6 3.5 - 5.5 mmol/L    Chloride 110 100 - 111 mmol/L    CO2 28 21 - 32 mmol/L    Anion gap 4 3.0 - 18 mmol/L    Glucose 76 74 - 99 mg/dL    BUN 24 (H) 7.0 - 18 MG/DL    Creatinine 0.91 0.6 - 1.3 MG/DL    BUN/Creatinine ratio 26 (H) 12 - 20      GFR est AA >60 >60 ml/min/1.73m2    GFR est non-AA >60 >60 ml/min/1.73m2    Calcium 10.0 8.5 - 10.1 MG/DL    Bilirubin, total 0.4 0.2 - 1.0 MG/DL    ALT (SGPT) 35 16 - 61 U/L    AST (SGOT) 19 10 - 38 U/L    Alk. phosphatase 104 45 - 117 U/L    Protein, total 7.4 6.4 - 8.2 g/dL    Albumin 4.2 3.4 - 5.0 g/dL    Globulin 3.2 2.0 - 4.0 g/dL    A-G Ratio 1.3 0.8 - 1.7       Assessment: #1. ASHD doing well since bypass surgery almost 10 years ago, he will continue aspirin 81 mg daily. #2. Hyperlipidemia doing well on Crestor, he will continue brand-name Crestor 20 mg daily. #3.  History of stroke doing well. Follow-up in December with Dr. Bhargavi Samuels, he wanted to see Dr. Brisa Llanes because he sees Dr. Tatum Zaman but have informed him that Dr. Brisa Llanes cannot see any more new patients. He probably does not need any lab done for another year. Rodolfo Hargrove MD FACP    Please note: This document has been produced using voice recognition software. Unrecognized errors in transcription may be present.

## 2020-09-28 NOTE — TELEPHONE ENCOUNTER
Last Visit: 6/23/20 with MD Phillip Nobles  Next Appointment: 12/28/20 with MD Michael White  Previous Refill Encounter(s): 6/3/19 #90 with 3 refills    Requested Prescriptions     Pending Prescriptions Disp Refills    Crestor 20 mg tablet 90 Tab 0     Sig: Take 1 Tab by mouth nightly.

## 2020-09-29 RX ORDER — ROSUVASTATIN CALCIUM 20 MG/1
20 TABLET, FILM COATED ORAL
Qty: 90 TAB | Refills: 0 | Status: SHIPPED | OUTPATIENT
Start: 2020-09-29 | End: 2021-02-23

## 2020-12-24 PROBLEM — I48.91 ATRIAL FIBRILLATION (HCC): Status: ACTIVE | Noted: 2020-12-24

## 2020-12-24 PROBLEM — Z86.79 HISTORY OF INTRACRANIAL HEMORRHAGE: Status: ACTIVE | Noted: 2020-06-23

## 2020-12-24 PROBLEM — Z95.1 S/P CABG X 4: Status: ACTIVE | Noted: 2020-12-24

## 2020-12-24 PROBLEM — I61.2 NONTRAUMATIC HEMORRHAGE OF RIGHT CEREBRAL HEMISPHERE (HCC): Status: RESOLVED | Noted: 2018-05-08 | Resolved: 2020-12-24

## 2020-12-25 NOTE — PROGRESS NOTES
82 y.o. WHITE OR  male who presents for evaluation. He is transferring care from Dr Allison Harrison. At least 1 hour was spent reviewing the EMR and reviewing all available records    No cardiovascular complaints. He continues to f/u w Dr Jose A Martinez and was recently seen, no new recs. Remains active at home doing his almost daily calisthenics of situps/pushups/squats then walks the dog and also does 2 mi walks 3x/week about 2 miles    No new neurologic complaints since his event 2016. Not on [address] due to cryptogenic bleed    No gi or gu issues    Denies polyuria, polydipsia, nocturia, vision change. Not checking sugars at this time. Admits the diet could be better    A lot of his day os helping out with his wife who has dementia    LAST MEDICARE WELLNESS EXAM: 7/10/15, 3/8/17, 12/28/20    Past Medical History:   Diagnosis Date    Atrial fibrillation (HCC)     Chads2 2; no OAC due to h/o cryptogenic ICH    BPH     on CT 8/19    CABG     9/10   LIMA - LAD/D1,  SVG - OM,  SVG - PDA    Coronary artery disease     cath 2010 LM 20-30%, LAD 60-70%, Cx 100%, RCA prox 70&, mid 80%, ef 45%    GERD     Hyperlipidemia     Nontraumatic hemorrhage of right cerebral hemisphere ([facility] [address] 05/08/2018    [address]; cryptogenic    Peptic ulcer disease      Past Surgical History:   Procedure Laterality Date    HX APPENDECTOMY      HX CHOLECYSTECTOMY  09/03/2019    [facility] Dr Eastman Bias HX COLONOSCOPY  2014?     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 [employer]   Social Needs    Financial resource strain: Not on file    Food insecurity     Worry: Not on file Inability: Not on file    Transportation needs     Medical: Not on file     Non-medical: Not on file   Tobacco Use    Smoking status: Never Smoker    Smokeless tobacco: Never Used   Substance and Sexual Activity    Alcohol use: No    Drug use: No    Sexual activity: Yes     Partners: Female     Birth control/protection: None   Lifestyle    Physical activity     Days per week: Not on file     Minutes per session: Not on file    Stress: Not on file   Relationships    Social connections     Talks on phone: Not on file     Gets together: Not on file     Attends Christian service: Not on file     Active member of club or organization: Not on file     Attends meetings of clubs or organizations: Not on file     Relationship status: Not on file    Intimate partner violence     Fear of current or ex partner: Not on file     Emotionally abused: Not on file     Physically abused: Not on file     Forced sexual activity: Not on file   Other Topics Concern    Not on file   Social History Narrative    Not on file     Current Outpatient Medications   Medication Sig    Crestor 20 mg tablet Take 1 Tab by mouth nightly.  furosemide (LASIX) 20 mg tablet Take  by mouth daily.  CHOLECALCIFEROL, VITAMIN D3, PO Take 5,000 Units by mouth.  VITAMIN B COMPLEX PO Take  by mouth.  ondansetron (ZOFRAN ODT) 4 mg disintegrating tablet Take 4 mg by mouth.  aspirin 81 mg tablet Take 81 mg by mouth daily.  MULTIVITAMIN PO Take  by mouth daily. No current facility-administered medications for this visit.       Allergies   Allergen Reactions    Atorvastatin Myalgia and Other (comments)     Myalgia    Pcn [Penicillins] Other (comments)     Diaphoretic, elevates blood pressure, insomnia    Pravastatin Myalgia and Other (comments)     Myalgia    Amoxicillin Other (comments)     Felt shaky and nervous    Rosuvastatin Other (comments)     Can only take name brand CRESTOR     REVIEW OF SYSTEMS: devin 2013-14  Francesca Chute? Ophtho  no vision change or eye pain  Oral  no mouth pain, tongue or tooth problems  Ears  no hearing loss, ear pain, fullness, no swallowing problems  Cardiac  no CP, PND, orthopnea, edema, palpitations or syncope  Chest  no breast masses  Resp  no wheezing, chronic coughing, dyspnea  GI  no heartburn, nausea, vomiting, change in bowel habits, bleeding, hemorrhoids  Urinary  no dysuria, hematuria, flank pain, urgency, frequency    Visit Vitals  /72   Pulse (!) 56   Temp 97.7 °F (36.5 °C) (Temporal)   Resp 14   Ht 5' 10\" (1.778 m)   Wt 177 lb (80.3 kg)   SpO2 96%   BMI 25.40 kg/m²     A&O x3  Affect is appropriate. Mood stable  No apparent distress  Anicteric, no JVD, adenopathy or thyromegaly. No carotid bruits or radiated murmur  Lungs clear to auscultation, no wheezes or rales  Heart showed irregular rhythm. No murmur, rubs, gallops  Abdomen soft nontender, no hepatosplenomegaly or masses. Extremities without edema.   Pulses 1-2+ symmetrically    LABS  From 3/12 showed gluc 89, cr 0.87, gfr>60, alt 18, chol 125, tg 72,   hdl 43, ldl-c 68,   wbc 3.3, hb 13.5, plt 149  From 6/13 showed               chol 123, tg 75,   hdl 42, ldl-c 66,               tsh 3.28  From 7/15 showed gluc 92, cr 0.90, gfr>60, alt<5, chol 189, tg 117, hdl 43, ldl-c 123,  wbc 4.2, hb 13.6, plt 152, tsh 2.75, ua neg  From 3/17 showed gluc 88, cr 0.96, gfr>60, alt 28, chol 212, tg 105, hdl 46, ldl-c 145, wbc 4.1, hb 13.6, plt 172, tsh 2.57  From 8/18 showed gluc 90, cr 0.89, gfr>60, alt 27, chol 189, tg 84,   hdl 55, ldl-c 117, wbc 3.9, hb 13.3, plt 141,  From 6/20 showed gluc 76, cr 0.91, gfr>60, alt 35, chol 151, tg 62,   hdl 57, ldl-c 82    We reviewed the patient's labs from the last several visits to point out trends in the numbers        Patient Active Problem List   Diagnosis Code    GERD (gastroesophageal reflux disease) K21.9    Coronary artery disease I25.10    Advance directive discussed with patient Z71.89    Hyperlipidemia E78.5    History of intracranial hemorrhage Z86.79    S/P CABG x 4 Z95.1    Atrial fibrillation (HCC) I48.91     Assessment and plan:  1. GERD. Avoidance measures  2. CHD. Continue current regimen and f/u Dr Nima Lucas  3. HLP. We discussed possibly inc the crestor to 40 but he declined and wants to do better with diet  4. Afib. Rate controlled, off [address] due to prior bleed      RTC 6/21    Above conditions discussed at length and patient vocalized understanding.   All questions answered to patient satisfaction

## 2020-12-28 ENCOUNTER — OFFICE VISIT (OUTPATIENT)
Dept: INTERNAL MEDICINE CLINIC | Age: 85
End: 2020-12-28
Payer: MEDICARE

## 2020-12-28 VITALS
RESPIRATION RATE: 14 BRPM | HEART RATE: 56 BPM | TEMPERATURE: 97.7 F | SYSTOLIC BLOOD PRESSURE: 132 MMHG | BODY MASS INDEX: 25.34 KG/M2 | OXYGEN SATURATION: 96 % | DIASTOLIC BLOOD PRESSURE: 72 MMHG | HEIGHT: 70 IN | WEIGHT: 177 LBS

## 2020-12-28 DIAGNOSIS — Z13.1 SCREENING FOR DIABETES MELLITUS: ICD-10-CM

## 2020-12-28 DIAGNOSIS — I25.10 ATHEROSCLEROSIS OF NATIVE CORONARY ARTERY OF NATIVE HEART WITHOUT ANGINA PECTORIS: ICD-10-CM

## 2020-12-28 DIAGNOSIS — Z00.00 MEDICARE ANNUAL WELLNESS VISIT, SUBSEQUENT: Primary | ICD-10-CM

## 2020-12-28 DIAGNOSIS — Z86.79 HISTORY OF INTRACRANIAL HEMORRHAGE: ICD-10-CM

## 2020-12-28 DIAGNOSIS — E78.5 HYPERLIPIDEMIA, UNSPECIFIED HYPERLIPIDEMIA TYPE: ICD-10-CM

## 2020-12-28 DIAGNOSIS — I48.91 ATRIAL FIBRILLATION, UNSPECIFIED TYPE (HCC): ICD-10-CM

## 2020-12-28 DIAGNOSIS — Z71.89 ADVANCED DIRECTIVES, COUNSELING/DISCUSSION: ICD-10-CM

## 2020-12-28 DIAGNOSIS — Z95.1 S/P CABG X 4: ICD-10-CM

## 2020-12-28 DIAGNOSIS — K21.9 GASTROESOPHAGEAL REFLUX DISEASE WITHOUT ESOPHAGITIS: ICD-10-CM

## 2020-12-28 PROCEDURE — 99215 OFFICE O/P EST HI 40 MIN: CPT | Performed by: INTERNAL MEDICINE

## 2020-12-28 PROCEDURE — G8510 SCR DEP NEG, NO PLAN REQD: HCPCS | Performed by: INTERNAL MEDICINE

## 2020-12-28 PROCEDURE — G8427 DOCREV CUR MEDS BY ELIG CLIN: HCPCS | Performed by: INTERNAL MEDICINE

## 2020-12-28 PROCEDURE — G0463 HOSPITAL OUTPT CLINIC VISIT: HCPCS | Performed by: INTERNAL MEDICINE

## 2020-12-28 PROCEDURE — 99497 ADVNCD CARE PLAN 30 MIN: CPT | Performed by: INTERNAL MEDICINE

## 2020-12-28 PROCEDURE — G0439 PPPS, SUBSEQ VISIT: HCPCS | Performed by: INTERNAL MEDICINE

## 2020-12-28 PROCEDURE — G8419 CALC BMI OUT NRM PARAM NOF/U: HCPCS | Performed by: INTERNAL MEDICINE

## 2020-12-28 PROCEDURE — 1101F PT FALLS ASSESS-DOCD LE1/YR: CPT | Performed by: INTERNAL MEDICINE

## 2020-12-28 PROCEDURE — G8536 NO DOC ELDER MAL SCRN: HCPCS | Performed by: INTERNAL MEDICINE

## 2020-12-28 NOTE — PROGRESS NOTES
This is the Subsequent Medicare Annual Wellness Exam    I have reviewed the patient's medical history in detail and updated the computerized patient record. Depression Risk Factor Screening:     3 most recent PHQ Screens 12/28/2020   Little interest or pleasure in doing things Not at all   Feeling down, depressed, irritable, or hopeless Not at all   Total Score PHQ 2 0     SCREENINGS  Colonoscopy last done 2014? Dr Sanjiv Gautam  Prostate DEQUAN done  PSA done unknown    Immunization History   Administered Date(s) Administered    Influenza High Dose Vaccine PF 11/07/2013, 11/17/2015, 03/08/2017    Influenza Vaccine 01/13/2015    Influenza Vaccine (Tri) Adjuvanted (>65 Yrs FLUAD TRI [id number]) 09/25/2018, 10/08/2019, 09/30/2020    Influenza Vaccine Split 09/20/2011, 11/14/2012    Influenza Vaccine Whole 09/13/2010    Pneumococcal Conjugate (PCV-13) 03/08/2017    Pneumococcal Polysaccharide (PPSV-23) 07/02/2014    Zoster Vaccine, Live 01/13/2015     Alcohol Risk Screen    Do you average more than 1 drink per night or more than 7 drinks a week: No    In the past three months have you have had more than 4 drinks containing alcohol on one occasion: No        Functional Ability and Level of Safety:    Hearing: Hearing is good. Activities of Daily Living: The home contains: no safety equipment. Patient does total self care      Ambulation: with no difficulty     Fall Risk:  Fall Risk Assessment, last 12 mths 12/28/2020   Able to walk? Yes   Fall in past 12 months? 0   Do you feel unsteady?  0   Are you worried about falling 0      Abuse Screen:  Patient is not abused       Cognitive Screening    Has your family/caregiver stated any concerns about your memory: no       Assessment/Plan   Education and counseling provided:  Are appropriate based on today's review and evaluation  End-of-Life planning (with patient's consent)  Influenza Vaccine  Cardiovascular screening blood test  Diabetes screening test    Diagnoses and all orders for this visit:    1. Medicare annual wellness visit, subsequent    2. Atrial fibrillation, unspecified type ([facility]    3. Atherosclerosis of native coronary artery of native heart without angina pectoris  -     LIPID PANEL; Future  -     METABOLIC PANEL, COMPREHENSIVE; Future  -     CBC W/O DIFF; Future    4. Gastroesophageal reflux disease without esophagitis    5. Hyperlipidemia, unspecified hyperlipidemia type  -     LIPID PANEL; Future    6. S/P CABG x 4    7. History of intracranial hemorrhage    8. Advanced directives, counseling/discussion    9. Screening for diabetes mellitus        Health Maintenance Due     Health Maintenance Due   Topic Date Due    DTaP/Tdap/Td series (1 - Tdap) 08/17/1958    Shingrix Vaccine Age 50> (1 of 2) 08/17/1987    GLAUCOMA SCREENING Q2Y  05/26/2019       Patient Care Team   Patient Care Team:  Britton Renee MD as PCP - General (Internal Medicine)  Irma Khalil MD (Cardiology)  Shima Galloway MD (Ophthalmology)    History     Patient Active Problem List   Diagnosis Code    GERD (gastroesophageal reflux disease) K21.9    Coronary artery disease I25.10    Advance directive discussed with patient Z71.89    Hyperlipidemia E78.5    History of intracranial hemorrhage Z86.79    S/P CABG x 4 Z95.1    Atrial fibrillation ([address] I48.91     Past Medical History:   Diagnosis Date    Atrial fibrillation ([facility]     Chads2 2; no OAC due to h/o cryptogenic ICH    BPH     on CT 8/19    CABG     9/10   LIMA - LAD/D1,  SVG - OM,  SVG - PDA    Coronary artery disease     cath 2010 LM 20-30%, LAD 60-70%, Cx 100%, RCA prox 70&, mid 80%, ef 45%    GERD     Hyperlipidemia     Nontraumatic hemorrhage of right cerebral hemisphere ([facility] 05/08/2018    [address]; cryptogenic    Peptic ulcer disease       Past Surgical History:   Procedure Laterality Date    HX APPENDECTOMY      HX CHOLECYSTECTOMY  09/03/2019    [address] Em Torres HX COLONOSCOPY  2014?     Dr Taylor Michel CORONARY ARTERY BYPASS GRAFT  09/2010    LIMA - LAD/D1,  SVG - OM,  SVG - PDA    HX HERNIA REPAIR  1974    [facility]     Current Outpatient Medications   Medication Sig Dispense Refill    Crestor 20 mg tablet Take 1 Tab by mouth nightly. 90 Tab 0    furosemide (LASIX) 20 mg tablet Take  by mouth daily.  CHOLECALCIFEROL, VITAMIN D3, PO Take 5,000 Units by mouth.  VITAMIN B COMPLEX PO Take  by mouth.  ondansetron (ZOFRAN ODT) 4 mg disintegrating tablet Take 4 mg by mouth.  aspirin 81 mg tablet Take 81 mg by mouth daily.  MULTIVITAMIN PO Take  by mouth daily. Allergies   Allergen Reactions    Atorvastatin Myalgia and Other (comments)     Myalgia    Pcn [Penicillins] Other (comments)     Diaphoretic, elevates blood pressure, insomnia    Pravastatin Myalgia and Other (comments)     Myalgia    Amoxicillin Other (comments)     Felt shaky and nervous    Rosuvastatin Other (comments)     Can only take name brand CRESTOR       Family History   Problem Relation Age of Onset    Cancer Mother         liver     Social History     Tobacco Use    Smoking status: Never Smoker    Smokeless tobacco: Never Used   Substance Use Topics    Alcohol use:  No

## 2020-12-28 NOTE — PATIENT INSTRUCTIONS
Medicare Wellness Visit, Male The best way to live healthy is to have a lifestyle where you eat a well-balanced diet, exercise regularly, limit alcohol use, and quit all forms of tobacco/nicotine, if applicable. Regular preventive services are another way to keep healthy. Preventive services (vaccines, screening tests, monitoring & exams) can help personalize your care plan, which helps you manage your own care. Screening tests can find health problems at the earliest stages, when they are easiest to treat. [facility] follows the current, evidence-based guidelines published by the [facility] ([facility]) when recommending preventive services for our patients. Because we follow these guidelines, sometimes recommendations change over time as research supports it. (For example, a prostate screening blood test is no longer routinely recommended for men with no symptoms). Of course, you and your doctor may decide to screen more often for some diseases, based on your risk and co-morbidities (chronic disease you are already diagnosed with). Preventive services for you include: - Medicare offers their members a free annual wellness visit, which is time for you and your primary care provider to discuss and plan for your preventive service needs. Take advantage of this benefit every year! 
-All adults over age 72 should receive the recommended pneumonia vaccines. Current USPSTF guidelines recommend a series of two vaccines for the best pneumonia protection.  
-All adults should have a flu vaccine yearly and tetanus vaccine every 10 years. 
-All adults age 48 and older should receive the shingles vaccines (series of two vaccines). -All adults age 38-68 who are overweight should have a diabetes screening test once every three years. -Other screening tests & preventive services for persons with diabetes include: an eye exam to screen for diabetic retinopathy, a kidney function test, a foot exam, and stricter control over your cholesterol.  
-Cardiovascular screening for adults with routine risk involves an electrocardiogram (ECG) at intervals determined by the provider.  
-Colorectal cancer screening should be done for adults age 54-65 with no increased risk factors for colorectal cancer. There are a number of acceptable methods of screening for this type of cancer. Each test has its own benefits and drawbacks. Discuss with your provider what is most appropriate for you during your annual wellness visit. The different tests include: colonoscopy (considered the best screening method), a fecal occult blood test, a fecal DNA test, and sigmoidoscopy. 
-All adults born between [facility] should be screened once for Hepatitis C. 
-An Abdominal Aortic Aneurysm (AAA) Screening is recommended for men age 73-68 who has ever smoked in their lifetime. Here is a list of your current Health Maintenance items (your personalized list of preventive services) with a due date: 
Health Maintenance Due Topic Date Due  
 DTaP/Tdap/Td  (1 - Tdap) 08/17/1958  Shingles Vaccine (1 of 2) 08/17/1987  Glaucoma Screening   05/26/2019

## 2020-12-28 NOTE — ACP (ADVANCE CARE PLANNING)
Advance Care Planning     General Advance Care Planning (ACP) Conversation      Date of Conversation: 12/28/2020  Conducted with: Patient with Decision Making Capacity    Healthcare Decision Maker:     Click here to complete [address] including selection of the [address] Relationship (ie \"Primary\")  Today we documented Decision Maker(s) consistent with Legal Next of Kin hierarchy.     Content/Action Overview:   Has NO ACP documents/care preferences - information provided, considering goals and options  Reviewed DNR/DNI and patient elects Full Code (Attempt Resuscitation)  Topics discussed: hospitalization preferences, resuscitation preferences and end of life care preferences (vegetative state/imminent death)  Additional Comments: none     Length of Voluntary ACP Conversation in minutes:  16 minutes    Lizzy Maldonado MD

## 2021-07-25 ENCOUNTER — APPOINTMENT (OUTPATIENT)
Dept: CT IMAGING | Age: 86
DRG: 065 | End: 2021-07-25
Attending: EMERGENCY MEDICINE
Payer: MEDICARE

## 2021-07-25 ENCOUNTER — HOSPITAL ENCOUNTER (INPATIENT)
Age: 86
LOS: 2 days | Discharge: HOME | DRG: 065 | End: 2021-07-27
Attending: EMERGENCY MEDICINE | Admitting: INTERNAL MEDICINE
Payer: MEDICARE

## 2021-07-25 DIAGNOSIS — I48.0 PAROXYSMAL ATRIAL FIBRILLATION (HCC): ICD-10-CM

## 2021-07-25 DIAGNOSIS — I63.9 CEREBROVASCULAR ACCIDENT (CVA), UNSPECIFIED MECHANISM (HCC): Primary | ICD-10-CM

## 2021-07-25 PROBLEM — R29.810 FACIAL DROOP: Status: ACTIVE | Noted: 2021-07-25

## 2021-07-25 PROBLEM — R29.898 LEFT ARM WEAKNESS: Status: ACTIVE | Noted: 2021-07-25

## 2021-07-25 LAB
ANION GAP SERPL CALC-SCNC: 6 MMOL/L (ref 3–18)
BASOPHILS # BLD: 0 K/UL (ref 0–0.1)
BASOPHILS NFR BLD: 1 % (ref 0–2)
BUN SERPL-MCNC: 28 MG/DL (ref 7–18)
BUN/CREAT SERPL: 29 (ref 12–20)
CALCIUM SERPL-MCNC: 9.3 MG/DL (ref 8.5–10.1)
CHLORIDE SERPL-SCNC: 110 MMOL/L (ref 100–111)
CO2 SERPL-SCNC: 25 MMOL/L (ref 21–32)
CREAT SERPL-MCNC: 0.96 MG/DL (ref 0.6–1.3)
DIFFERENTIAL METHOD BLD: ABNORMAL
EOSINOPHIL # BLD: 0.1 K/UL (ref 0–0.4)
EOSINOPHIL NFR BLD: 1 % (ref 0–5)
ERYTHROCYTE [DISTWIDTH] IN BLOOD BY AUTOMATED COUNT: 14.1 % (ref 11.6–14.5)
GLUCOSE BLD STRIP.AUTO-MCNC: 104 MG/DL (ref 70–110)
GLUCOSE SERPL-MCNC: 87 MG/DL (ref 74–99)
HCT VFR BLD AUTO: 38.1 % (ref 36–48)
HGB BLD-MCNC: 12.9 G/DL (ref 13–16)
INR PPP: 1.1 (ref 0.8–1.2)
LYMPHOCYTES # BLD: 1.7 K/UL (ref 0.9–3.6)
LYMPHOCYTES NFR BLD: 29 % (ref 21–52)
MCH RBC QN AUTO: 31.4 PG (ref 24–34)
MCHC RBC AUTO-ENTMCNC: 33.9 G/DL (ref 31–37)
MCV RBC AUTO: 92.7 FL (ref 74–97)
MONOCYTES # BLD: 0.8 K/UL (ref 0.05–1.2)
MONOCYTES NFR BLD: 14 % (ref 3–10)
NEUTS SEG # BLD: 3.2 K/UL (ref 1.8–8)
NEUTS SEG NFR BLD: 55 % (ref 40–73)
PLATELET # BLD AUTO: 116 K/UL (ref 135–420)
PMV BLD AUTO: 10.5 FL (ref 9.2–11.8)
POTASSIUM SERPL-SCNC: 4.1 MMOL/L (ref 3.5–5.5)
PROTHROMBIN TIME: 13.9 SEC (ref 11.5–15.2)
RBC # BLD AUTO: 4.11 M/UL (ref 4.35–5.65)
SODIUM SERPL-SCNC: 141 MMOL/L (ref 136–145)
TROPONIN I SERPL-MCNC: <0.02 NG/ML (ref 0–0.04)
WBC # BLD AUTO: 5.7 K/UL (ref 4.6–13.2)

## 2021-07-25 PROCEDURE — 93005 ELECTROCARDIOGRAM TRACING: CPT

## 2021-07-25 PROCEDURE — 74011000636 HC RX REV CODE- 636: Performed by: EMERGENCY MEDICINE

## 2021-07-25 PROCEDURE — 99223 1ST HOSP IP/OBS HIGH 75: CPT | Performed by: INTERNAL MEDICINE

## 2021-07-25 PROCEDURE — 2709999900 HC NON-CHARGEABLE SUPPLY

## 2021-07-25 PROCEDURE — 82962 GLUCOSE BLOOD TEST: CPT

## 2021-07-25 PROCEDURE — 85610 PROTHROMBIN TIME: CPT

## 2021-07-25 PROCEDURE — 84484 ASSAY OF TROPONIN QUANT: CPT

## 2021-07-25 PROCEDURE — 99284 EMERGENCY DEPT VISIT MOD MDM: CPT

## 2021-07-25 PROCEDURE — 65660000000 HC RM CCU STEPDOWN

## 2021-07-25 PROCEDURE — 85025 COMPLETE CBC W/AUTO DIFF WBC: CPT

## 2021-07-25 PROCEDURE — 70498 CT ANGIOGRAPHY NECK: CPT

## 2021-07-25 PROCEDURE — 70450 CT HEAD/BRAIN W/O DYE: CPT

## 2021-07-25 PROCEDURE — 80048 BASIC METABOLIC PNL TOTAL CA: CPT

## 2021-07-25 RX ORDER — ONDANSETRON 2 MG/ML
4 INJECTION INTRAMUSCULAR; INTRAVENOUS
Status: DISCONTINUED | OUTPATIENT
Start: 2021-07-25 | End: 2021-07-27 | Stop reason: HOSPADM

## 2021-07-25 RX ORDER — HEPARIN SODIUM 5000 [USP'U]/ML
5000 INJECTION, SOLUTION INTRAVENOUS; SUBCUTANEOUS EVERY 8 HOURS
Status: DISCONTINUED | OUTPATIENT
Start: 2021-07-25 | End: 2021-07-25

## 2021-07-25 RX ORDER — AMOXICILLIN 250 MG
1 CAPSULE ORAL DAILY
Status: DISCONTINUED | OUTPATIENT
Start: 2021-07-26 | End: 2021-07-27 | Stop reason: HOSPADM

## 2021-07-25 RX ORDER — ASPIRIN 325 MG
325 TABLET ORAL DAILY
Status: DISCONTINUED | OUTPATIENT
Start: 2021-07-26 | End: 2021-07-27

## 2021-07-25 RX ORDER — ROSUVASTATIN CALCIUM 20 MG/1
40 TABLET, COATED ORAL DAILY
Status: DISCONTINUED | OUTPATIENT
Start: 2021-07-26 | End: 2021-07-25 | Stop reason: CLARIF

## 2021-07-25 RX ORDER — BISACODYL 5 MG
5 TABLET, DELAYED RELEASE (ENTERIC COATED) ORAL DAILY PRN
Status: DISCONTINUED | OUTPATIENT
Start: 2021-07-25 | End: 2021-07-27 | Stop reason: HOSPADM

## 2021-07-25 RX ORDER — SODIUM CHLORIDE 9 MG/ML
75 INJECTION, SOLUTION INTRAVENOUS CONTINUOUS
Status: DISCONTINUED | OUTPATIENT
Start: 2021-07-25 | End: 2021-07-26

## 2021-07-25 RX ORDER — ROSUVASTATIN CALCIUM 40 MG/1
40 TABLET, COATED ORAL DAILY
Status: DISCONTINUED | OUTPATIENT
Start: 2021-07-26 | End: 2021-07-27 | Stop reason: HOSPADM

## 2021-07-25 RX ORDER — ACETAMINOPHEN 325 MG/1
650 TABLET ORAL
Status: DISCONTINUED | OUTPATIENT
Start: 2021-07-25 | End: 2021-07-27 | Stop reason: HOSPADM

## 2021-07-25 RX ADMIN — IOPAMIDOL 80 ML: 755 INJECTION, SOLUTION INTRAVENOUS at 17:01

## 2021-07-25 NOTE — H&P
History and Physical    Chief complaint: Patient presented to ED by EMS for further evaluation of left upper extremity weakness and facial droop started around 3 PM.    Source of information: Patient's, patient's daughter and ED provider    Martin Bhatt MD    HPI:     Ashley Kenyon is a 82 y.o.  male who presented to the emergency room via EMS for further evaluation and management of left upper extremity weakness associated with left facial droop started around 3 PM.  Patient was sitting on couch and all of a sudden he started losing his . His daughter gave him 325 mg aspirin prior to calling EMS. Patient had left inguinal hernia repair done by Dr. Walter Perrin on this Friday and did not take aspirin on Friday as well as Saturday. Patient also mentioned that he has history of paroxysmal atrial fibrillation and under care of Dr. Diana Emery but has not been advised to take any anticoagulation due to increased risk of bleeding. Patient is feeling much better currently. No headaches or dizziness. No visual disturbances. No dysphagia. No soreness of throat. Denies any chest pain or shortness of breath or palpitation or cough. He did not have any up symptoms prior to this event. No nausea or vomiting. Has had no bowel movement for last 2 days since surgery but no abdominal pain. His wife accidentally sat on his left thigh yesterday and had some bleeding but nothing unusual.  Denies any tingling or numbness currently. His left hand weakness is much better currently. No recent change in medications. He was just taking ibuprofen for pain management at home. He has been on aspirin and Crestor. Patient says he cannot take anything to substitute his Crestor as he is allergic to it. Denies smoking cigarettes or drinking alcohol. He has been  for 61 years. He is currently living with his daughter since surgery as his wife has Alzheimer's dementia.   Patient's past medical history includes coronary artery disease and bypass surgery, ruptured appendix while he was 27years old, stroke in 2018 and paroxysmal atrial fibrillation. 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 Dr Des Loya GRAFT  09/2010    LIMA - LAD/D1,  SVG - OM,  SVG - PDA    HX HERNIA REPAIR  1974    [facility]     Family History   Problem Relation Age of Onset    Cancer Mother         liver      Social History     Tobacco Use    Smoking status: Never Smoker    Smokeless tobacco: Never Used   Substance Use Topics    Alcohol use: No       Prior to Admission medications    Medication Sig Start Date End Date Taking? Authorizing Provider   Crestor 20 mg tablet TAKE ONE TABLET BY MOUTH ONCE NIGHTLY 2/23/21   Yves Mullins MD   furosemide (LASIX) 20 mg tablet Take  by mouth daily. Provider, Historical   CHOLECALCIFEROL, VITAMIN D3, PO Take 5,000 Units by mouth. Provider, Historical   VITAMIN B COMPLEX PO Take  by mouth. Provider, Historical   ondansetron (ZOFRAN ODT) 4 mg disintegrating tablet Take 4 mg by mouth. 9/2/19   Provider, Historical   aspirin 81 mg tablet Take 81 mg by mouth daily. Provider, Historical   MULTIVITAMIN PO Take  by mouth daily.     Provider, Historical     Allergies   Allergen Reactions    Atorvastatin Myalgia and Other (comments)     Myalgia    Pcn [Penicillins] Other (comments)     Diaphoretic, elevates blood pressure, insomnia    Pravastatin Myalgia and Other (comments)     Myalgia    Amoxicillin Other (comments)     Felt shaky and nervous    Rosuvastatin Other (comments)     Can only take name brand CRESTOR        Review of Systems:    Review of Systems-  GENERAL: No fever, chills, malaise, weight changes  HEENT: No change in vision, no earache, sore throat or sinus congestion. NECK: No pain or stiffness. PULMONARY: No shortness of breath, cough or wheeze. Cardiovascular: no pnd or orthopnea, no CP  GASTROINTESTINAL: No abdominal pain, nausea, vomiting or diarrhea, melena or bright red blood per rectum. GENITOURINARY: No urinary frequency, urgency or dysuria. MUSCULOSKELETAL: No joint or muscle pain, no back pain, no recent trauma. DERMATOLOGIC: No rash, no itching. Recent surgery of left inguinal hernia on this Friday. ENDOCRINE: No polyuria, polydipsia. No recent change in weight. HEMATOLOGICAL: No easy bruising or bleeding. NEUROLOGIC: No headache or seizures. No tingling or numbness currently. Left arm weakness still present especially has poor left hand . Objective: Intake and Output:    No intake/output data recorded. No intake/output data recorded. Physical Exam:     BP (!) 170/142   Pulse 79   Resp 23   SpO2 98%     General appearance -awake, not in acute distress, follows commands appropriately  Head -atraumatic, normocephalic  Eyes - sclera anicteric, no pallor  Nose - no obvious nasal discharge. Neck - supple, no JVD, trachea is midline  Chest -clear air entry noted in bases, no wheezes  Heart - S1 and S2 normal  Abdomen - soft, nontender, nondistended, Bowel sounds present, left inguinal dressing in situ with some blood soaking discharge. Some of the blood is fresh. Neurological - alert, oriented, normal speech, left hand motor strength 3-4 out of 5 as well as left facial droop present.   Musculoskeletal - no joint tenderness or swelling of knees bilaterally  Extremities - no pitting pedal edema noted      Data Review:   Recent Results (from the past 24 hour(s))   CBC WITH AUTOMATED DIFF    Collection Time: 07/25/21  3:50 PM   Result Value Ref Range    WBC 5.7 4.6 - 13.2 K/uL    RBC 4.11 (L) 4.35 - 5.65 M/uL    HGB 12.9 (L) 13.0 - 16.0 g/dL    HCT 38.1 36.0 - 48.0 %    MCV 92.7 74.0 - 97.0 FL    MCH 31.4 24.0 - 34.0 PG    MCHC 33.9 31.0 - 37.0 g/dL    RDW 14.1 11.6 - 14.5 %    PLATELET 026 (L) 494 - 420 K/uL    MPV 10.5 9.2 - 11.8 FL    NEUTROPHILS 55 40 - 73 %    LYMPHOCYTES 29 21 - 52 %    MONOCYTES 14 (H) 3 - 10 %    EOSINOPHILS 1 0 - 5 %    BASOPHILS 1 0 - 2 %    ABS. NEUTROPHILS 3.2 1.8 - 8.0 K/UL    ABS. LYMPHOCYTES 1.7 0.9 - 3.6 K/UL    ABS. MONOCYTES 0.8 0.05 - 1.2 K/UL    ABS. EOSINOPHILS 0.1 0.0 - 0.4 K/UL    ABS. BASOPHILS 0.0 0.0 - 0.1 K/UL    DF AUTOMATED     METABOLIC PANEL, BASIC    Collection Time: 07/25/21  3:50 PM   Result Value Ref Range    Sodium 141 136 - 145 mmol/L    Potassium 4.1 3.5 - 5.5 mmol/L    Chloride 110 100 - 111 mmol/L    CO2 25 21 - 32 mmol/L    Anion gap 6 3.0 - 18 mmol/L    Glucose 87 74 - 99 mg/dL    BUN 28 (H) 7.0 - 18 MG/DL    Creatinine 0.96 0.6 - 1.3 MG/DL    BUN/Creatinine ratio 29 (H) 12 - 20      GFR est AA >60 >60 ml/min/1.73m2    GFR est non-AA >60 >60 ml/min/1.73m2    Calcium 9.3 8.5 - 10.1 MG/DL   PROTHROMBIN TIME + INR    Collection Time: 07/25/21  3:50 PM   Result Value Ref Range    Prothrombin time 13.9 11.5 - 15.2 sec    INR 1.1 0.8 - 1.2     TROPONIN I    Collection Time: 07/25/21  3:50 PM   Result Value Ref Range    Troponin-I, QT <0.02 0.0 - 0.045 NG/ML       CT Results  (Last 48 hours)               07/25/21 1559  CT HEAD WO CONT Final result    Impression:      No acute CT findings. Large encephalomalacia right parietal lobe consistent with old previously   hemorrhagic stroke described on studies from 2018.        I have telephoned a wet reading directly to Dr. Scott Alfredo  at 4:10 PM on 7/25/2021. Narrative:  NONCONTRAST HEAD CT       INDICATION: Above. CODE S stroke protocol head CT. Onset left arm weakness. History of CVA. COMPARISON: No priors in PACS. Report is noted in the electronic medical record   (care everywhere) of previous head CT performed 7/20/2018 at [address] [place] [address],   images from which are not available at this time. TECHNIQUE: Serial axial CT images through the brain were obtained without   administration of intravenous contrast. Additional coronal and sagittal   reformation images were also performed. All CT scans at this facility are performed using dose optimization technique as   appropriate to the performed exam, to include automated exposure control,   adjustment of the mA and/or kV according to patient's size (Including   appropriate matching for site-specific examinations), or use of iterative   reconstruction technique. FINDINGS:       Large region of encephalomalacia right parietal lobe consistent with old   hemorrhagic infarct described by report of prior studies from 2018. There is   associated mild ex vacuo dilatation of the adjacent right lateral ventricle. There is otherwise mild diffuse prominence of the cortical sulci compatible with   cerebral volume loss, not grossly remarkable for the patient's age. There is no evidence of acute intracranial hemorrhage. No edema, midline shift or other mass effect is seen. No mass lesion   identified. No evidence of acute ischemic stroke/ cerebrovascular accident (CVA) is   identified. Head CT is often insensitive early to acute ischemic stroke. The visualized portions of the paranasal sinuses demonstrate no significant   mucosal pathology. The mastoid air cells are aerated  The calvarium appears   intact.                  Assessment:     Active Problems:    CVA (cerebral vascular accident) ([address] (7/25/2021)      Paroxysmal atrial fibrillation ([address] (7/25/2021)      Facial droop (7/25/2021)      Left arm weakness (7/25/2021)      1. Left arm weakness with facial droop  2. CVA  3. 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 History of a paroxysmal atrial fibrillation  5. Coronary artery disease and history of CABG  6. Recent left inguinal hernia repair surgery with some oozing of blood from the site currently. Plan:     The patient will be admitted to telemetry floor. Patient will be continued on high-dose of aspirin and continued on Crestor 40 mg daily. I have advised nurses to change the dressing applied recent inguinal hernia site and will keep an eye on it for bleeding. I have talked to on-call neurologist about doing consult on this patient and he concurred with my plan to continue aspirin and statin at this point. Patient has some constipation that will be treated appropriately. PT, OT and speech therapist to follow this patient. MRI of the brain has been ordered. Echocardiogram will be ordered. Patient wishes to be a full code. The plan has been discussed with the patient and the patient's daughter at bedside and they verbalized understanding. Total time to take care of this patient was 65 minutes and more than 50% of time was spent counseling and coordinating care. Disclaimer: Sections of this note are dictated using utilizing voice recognition software, which may have resulted in some phonetic based errors in grammar and contents. Even though attempts were made to correct all the mistakes, some may have been missed, and remained in the body of the document. If questions arise, please contact our department.

## 2021-07-25 NOTE — ED NOTES
Bedside and Verbal shift change report given to 4S (oncoming nurse) by Davis Sainz RN (offgoing nurse). Report included the following information SBAR, Kardex, MAR and Recent Results.     SITUATION:    Code Status: Full Code   Reason for Admission: CVA (cerebral vascular accident) ([facility] [I63.9]   Left arm weakness [R29.898]   Facial droop [R29.810]   Paroxysmal atrial fibrillation ([facility] [I48.0]    [facility] day: 0   Problem List:       Hospital Problems  Date Reviewed: 12/28/2020        Codes Class Noted POA    CVA (cerebral vascular accident) 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    BPH     on CT 8/19    CABG     9/10   LIMA - LAD/D1,  SVG - OM,  SVG - PDA    Coronary artery disease     cath 2010 LM 20-30%, LAD 60-70%, Cx 100%, RCA prox 70&, mid 80%, ef 45%    GERD     Hyperlipidemia     Nontraumatic hemorrhage of right cerebral hemisphere ([facility] [facility] 05/08/2018    [facility]; cryptogenic    Peptic ulcer disease          Patient taking anticoagulants no     ASSESSMENT:    Changes in Assessment Throughout Shift: n     Patient has Central Line: no Reasons if yes: n   Patient has Joya Cath: no Reasons if yes: n         Last Vitals:     Vitals:    07/25/21 1615 07/25/21 1630 07/25/21 1800 07/25/21 1830   BP: (!) 152/102 (!) 170/142  (!) 168/103   Pulse: 79 79 61 61   Resp: 18 23 21 24   Temp:    98.5 °F (36.9 °C)   SpO2: 97% 98% 95% 96%        IV and DRAINS (will only show if present)    none     WOUND (if present)   Wound Type:  Surgical incision    Dressing present Steristrips and guaze   Wound Concerns/Notes:  none     PAIN    Pain Assessment                      o Interventions for Pain:  none  o Intervention effective: no  o Time of last intervention: n     o Reassessment Completed: no      Last 3 Weights: There were no vitals filed for this visit. Weight change:      INTAKE/OUPUT    Current Shift: No intake/output data recorded. Last three shifts: No intake/output data recorded.  LAB RESULTS     Recent Labs     07/25/21  1550   WBC 5.7   HGB 12.9*   HCT 38.1   *        Recent Labs     07/25/21  1550      K 4.1   GLU 87   BUN 28*   CREA 0.96   CA 9.3   INR 1.1       RECOMMENDATIONS AND DISCHARGE PLANNING     1. Pending tests/procedures/ Plan of Care or Other Needs: MRI     2. Discharge plan for patient and Needs/Barriers: n    3. Estimated Discharge Date: n Posted on Whiteboard in Patients Room: yes      4. The patient's care plan was reviewed with the oncoming nurse. \"HEALS\" SAFETY CHECK      Fall Risk         Safety Measures:      A safety check occurred in the patient's room between off going nurse and oncoming nurse listed above. The safety check included the below items  Area Items   H  High Alert Medications - Verify all high alert medication drips (heparin, PCA, etc.)   E  Equipment - Suction is set up for ALL patients (with yanker)  - Red plugs utilized for all equipment (IV pumps, etc.)  - WOWs wiped down at end of shift.  - Room stocked with oxygen, suction, and other unit-specific supplies   A  Alarms - Bed alarm is set for fall risk patients  - Ensure chair alarm is in place and activated if patient is up in a chair   L  Lines - Check IV for any infiltration  - Joya bag is empty if patient has a Joya   - Tubing and IV bags are labeled   S  Safety   - Room is clean, patient is clean, and equipment is clean. - Hallways are clear from equipment besides carts.    - Fall bracelet on for fall risk patients  - Ensure room is clear and free of clutter  - Suction is set up for ALL patients (with bradley)  - Hallways are clear from equipment besides carts.    - Isolation precautions followed, supplies available outside room, sign posted     Ronald Lares, RN

## 2021-07-25 NOTE — Clinical Note
Status[de-identified] INPATIENT [101]   Type of Bed: Stepdown [17]   Cardiac Monitoring Required?: No   Inpatient Hospitalization Certified Necessary for the Following Reasons: 3.  Patient receiving treatment that can only be provided in an inpatient setting (further clarification in H&P documentation)   Admitting Diagnosis: CVA (cerebral vascular accident) [facility]) [261820]   Admitting Physician: Slade Hua   Attending Physician: Slade Hua   Estimated Length of Stay: 2 Midnights   Discharge Plan[de-identified] Home with Office Follow-up

## 2021-07-25 NOTE — ED PROVIDER NOTES
EMERGENCY DEPARTMENT HISTORY AND PHYSICAL EXAM    3:50 PM patient seen at this time in the hallway on EMS stretcher      Date: 7/25/2021  Patient Name: Dominique Gil    History of Presenting Illness     No chief complaint on file. History Provided By: patient    Additional History (Context): Dominique Gil is a 82 y.o. male presents with has a history of stroke, no severe symptoms from that. Advising today at 82 developed dense flaccid paralysis of the left upper extremity and a left-sided facial droop. Family given 324 of aspirin. EMS checked blood sugar which is normal and symptoms are rapidly resolving. No difficulty swallowing double vision headache etc does have history of A. fib. Stroke process activated based on prehospital report. PCP: Indigo Norris MD    Chief Complaint:   Duration:    Timing:    Location:   Quality:   Severity:   Modifying Factors:   Associated Symptoms:       Current Outpatient Medications   Medication Sig Dispense Refill    Crestor 20 mg tablet TAKE ONE TABLET BY MOUTH ONCE NIGHTLY 30 Tab 5    furosemide (LASIX) 20 mg tablet Take  by mouth daily.  CHOLECALCIFEROL, VITAMIN D3, PO Take 5,000 Units by mouth.  VITAMIN B COMPLEX PO Take  by mouth.  ondansetron (ZOFRAN ODT) 4 mg disintegrating tablet Take 4 mg by mouth.  aspirin 81 mg tablet Take 81 mg by mouth daily.  MULTIVITAMIN PO Take  by mouth daily.          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 Dr Elsi Mathews GRAFT  09/2010    LIMA - LAD/D1,  SVG - OM,  SVG - PDA    HX HERNIA REPAIR  1974    [facility]       Family History:  Family History   Problem Relation Age of Onset    Cancer Mother         liver       Social History:  Social History     Tobacco Use    Smoking status: Never Smoker    Smokeless tobacco: Never Used   Substance Use Topics    Alcohol use: No    Drug use: No       Allergies: Allergies   Allergen Reactions    Atorvastatin Myalgia and Other (comments)     Myalgia    Pcn [Penicillins] Other (comments)     Diaphoretic, elevates blood pressure, insomnia    Pravastatin Myalgia and Other (comments)     Myalgia    Amoxicillin Other (comments)     Felt shaky and nervous    Rosuvastatin Other (comments)     Can only take name brand CRESTOR         Review of Systems     Review of Systems   Constitutional: Negative for diaphoresis and fever. HENT: Negative for congestion and sore throat. Eyes: Negative for pain and itching. Respiratory: Negative for cough and shortness of breath. Cardiovascular: Negative for chest pain and palpitations. Gastrointestinal: Negative for abdominal pain and diarrhea. Endocrine: Negative for polydipsia and polyuria. Genitourinary: Negative for dysuria and hematuria. Musculoskeletal: Negative for arthralgias and myalgias. Skin: Negative for rash and wound. Neurological: Positive for weakness. Negative for seizures and syncope. Hematological: Does not bruise/bleed easily. Psychiatric/Behavioral: Negative for agitation and hallucinations. Physical Exam       Patient Vitals for the past 12 hrs:   Pulse Resp BP SpO2   07/25/21 1630 79 23 (!) 170/142 98 %   07/25/21 1615 79 18 (!) 152/102 97 %       Physical Exam  Vitals and nursing note reviewed.    Constitutional: Appearance: He is well-developed. HENT:      Head: Normocephalic and atraumatic. Eyes:      General: No scleral icterus. Conjunctiva/sclera: Conjunctivae normal.   Neck:      Vascular: No JVD. Cardiovascular:      Rate and Rhythm: Normal rate and regular rhythm. Heart sounds: Normal heart sounds. Comments: 4 intact extremity pulses  Pulmonary:      Effort: Pulmonary effort is normal.      Breath sounds: Normal breath sounds. Abdominal:      Palpations: Abdomen is soft. There is no mass. Tenderness: There is no abdominal tenderness. Musculoskeletal:         General: Normal range of motion. Cervical back: Normal range of motion and neck supple. Comments: Left  is weak and some weakness of the extension at the wrist otherwise neurovascularly intact. Slight left-sided facial droop which the patient says is chronic and slight depression of the left soft palate. Otherwise neuro intact   Lymphadenopathy:      Cervical: No cervical adenopathy. Skin:     General: Skin is warm and dry. Neurological:      Mental Status: He is alert. Diagnostic Study Results   Labs -  Recent Results (from the past 12 hour(s))   CBC WITH AUTOMATED DIFF    Collection Time: 07/25/21  3:50 PM   Result Value Ref Range    WBC 5.7 4.6 - 13.2 K/uL    RBC 4.11 (L) 4.35 - 5.65 M/uL    HGB 12.9 (L) 13.0 - 16.0 g/dL    HCT 38.1 36.0 - 48.0 %    MCV 92.7 74.0 - 97.0 FL    MCH 31.4 24.0 - 34.0 PG    MCHC 33.9 31.0 - 37.0 g/dL    RDW 14.1 11.6 - 14.5 %    PLATELET 723 (L) 943 - 420 K/uL    MPV 10.5 9.2 - 11.8 FL    NEUTROPHILS 55 40 - 73 %    LYMPHOCYTES 29 21 - 52 %    MONOCYTES 14 (H) 3 - 10 %    EOSINOPHILS 1 0 - 5 %    BASOPHILS 1 0 - 2 %    ABS. NEUTROPHILS 3.2 1.8 - 8.0 K/UL    ABS. LYMPHOCYTES 1.7 0.9 - 3.6 K/UL    ABS. MONOCYTES 0.8 0.05 - 1.2 K/UL    ABS. EOSINOPHILS 0.1 0.0 - 0.4 K/UL    ABS.  BASOPHILS 0.0 0.0 - 0.1 K/UL    DF AUTOMATED     METABOLIC PANEL, BASIC    Collection Time: 07/25/21 3:50 PM   Result Value Ref Range    Sodium 141 136 - 145 mmol/L    Potassium 4.1 3.5 - 5.5 mmol/L    Chloride 110 100 - 111 mmol/L    CO2 25 21 - 32 mmol/L    Anion gap 6 3.0 - 18 mmol/L    Glucose 87 74 - 99 mg/dL    BUN 28 (H) 7.0 - 18 MG/DL    Creatinine 0.96 0.6 - 1.3 MG/DL    BUN/Creatinine ratio 29 (H) 12 - 20      GFR est AA >60 >60 ml/min/1.73m2    GFR est non-AA >60 >60 ml/min/1.73m2    Calcium 9.3 8.5 - 10.1 MG/DL   PROTHROMBIN TIME + INR    Collection Time: 07/25/21  3:50 PM   Result Value Ref Range    Prothrombin time 13.9 11.5 - 15.2 sec    INR 1.1 0.8 - 1.2     TROPONIN I    Collection Time: 07/25/21  3:50 PM   Result Value Ref Range    Troponin-I, QT <0.02 0.0 - 0.045 NG/ML       Radiologic Studies -   CT HEAD WO CONT   Final Result      No acute CT findings. Large encephalomalacia right parietal lobe consistent with old previously   hemorrhagic stroke described on studies from 2018. I have telephoned a wet reading directly to Dr. Bryce Marc  at 4:10 PM on   7/25/2021. CTA HEAD NECK W CONT    (Results Pending)     CT HEAD WO CONT    Result Date: 7/25/2021  NONCONTRAST HEAD CT INDICATION: Above. CODE S stroke protocol head CT. Onset left arm weakness. History of CVA. COMPARISON: No priors in PACS. Report is noted in the electronic medical record (care everywhere) of previous head CT performed 7/20/2018 at [address] [place] [address], images from which are not available at this time. TECHNIQUE: Serial axial CT images through the brain were obtained without administration of intravenous contrast. Additional coronal and sagittal reformation images were also performed. All CT scans at this facility are performed using dose optimization technique as appropriate to the performed exam, to include automated exposure control, adjustment of the mA and/or kV according to patient's size (Including appropriate matching for site-specific examinations), or use of iterative reconstruction technique.  FINDINGS: Large region of encephalomalacia right parietal lobe consistent with old hemorrhagic infarct described by report of prior studies from 2018. There is associated mild ex vacuo dilatation of the adjacent right lateral ventricle. There is otherwise mild diffuse prominence of the cortical sulci compatible with cerebral volume loss, not grossly remarkable for the patient's age. There is no evidence of acute intracranial hemorrhage. No edema, midline shift or other mass effect is seen. No mass lesion identified. No evidence of acute ischemic stroke/ cerebrovascular accident (CVA) is identified. Head CT is often insensitive early to acute ischemic stroke. The visualized portions of the paranasal sinuses demonstrate no significant mucosal pathology. The mastoid air cells are aerated  The calvarium appears intact. No acute CT findings. Large encephalomalacia right parietal lobe consistent with old previously hemorrhagic stroke described on studies from 2018. I have telephoned a wet reading directly to Dr. Queta Warner  at 4:10 PM on 7/25/2021. Medications ordered:   Medications   iopamidoL (ISOVUE-370) 76 % injection  mL (80 mL IntraVENous Given 7/25/21 1701)         Medical Decision Making   Initial Medical Decision Making and DDx:  Stroke process activated, he is within time window but improving deficits and fairly low level of deficits. We will have teleneurology see him and make recommendation  Not particularly suspicious for intracranial mass lesion or bleed. There is no hypoglycemia    ED Course: Progress Notes, Reevaluation, and Consults:  ED Course as of Jul 25 1752   Sun Jul 25, 2021   1634 [place] with Dr. Ella Watkins, neurology at bedside after his examination, resolving symptoms essentially normal exam with exception of some loss of the nasolabial fold on the left. Risk-benefit favors withholding TPA. I agree with this. Blood pressure is suitable.   Not in A. fib right now but does seem to have some PACs.  Cardiology can address this in the hospital, calling hospitalist    [CB]   3791 Dr. Blaise Mitchell posterior had CTA of the head and the neck, no high-grade stenosis or other actionable findings    [CB]   2686 D/w Dr Alvina Garcia in person in the emergency room discussed negative CTA, presentation dense left upper extremity weakness, has improved almost completely just has loss of nasolabial fold no TPA. Agrees with admission.    [CB]      ED Course User Index  [CB] Mgagie Vance MD         I am the first provider for this patient. I reviewed the vital signs, available nursing notes, past medical history, past surgical history, family history and social history. Patient Vitals for the past 12 hrs:   Pulse Resp BP SpO2   07/25/21 1630 79 23 (!) 170/142 98 %   07/25/21 1615 79 18 (!) 152/102 97 %       Vital Signs-Reviewed the patient's vital signs. Pulse Oximetry Analysis, Cardiac Monitor, 12 lead ekg:      Interpreted by the EP. Records Reviewed: Nursing notes reviewed (Time of Review: 3:50 PM)    Procedures:   Critical Care Time:   Aspirin: (was aspirin given for stroke?)    Diagnosis     Clinical Impression:   1. Cerebrovascular accident (CVA), unspecified mechanism ([facility]        Disposition: Admitted      Follow-up Information    None          Patient's Medications   Start Taking    No medications on file   Continue Taking    ASPIRIN 81 MG TABLET    Take 81 mg by mouth daily. CHOLECALCIFEROL, VITAMIN D3, PO    Take 5,000 Units by mouth. CRESTOR 20 MG TABLET    TAKE ONE TABLET BY MOUTH ONCE NIGHTLY    FUROSEMIDE (LASIX) 20 MG TABLET    Take  by mouth daily. MULTIVITAMIN PO    Take  by mouth daily. ONDANSETRON (ZOFRAN ODT) 4 MG DISINTEGRATING TABLET    Take 4 mg by mouth. VITAMIN B COMPLEX PO    Take  by mouth.    These Medications have changed    No medications on file   Stop Taking    No medications on file     _______________________________    Notes:    Patti Stiles MD using Dragon dictation      _______________________________

## 2021-07-25 NOTE — ED NOTES
Pt returned from CT   Denied SOB   Denied dizziness  Placed on monitor   Pt updated about plan of care and wait times for CT results  Gave blanket for comfort   Will continue to monitor and assess

## 2021-07-26 ENCOUNTER — APPOINTMENT (OUTPATIENT)
Dept: NON INVASIVE DIAGNOSTICS | Age: 86
DRG: 065 | End: 2021-07-26
Attending: INTERNAL MEDICINE
Payer: MEDICARE

## 2021-07-26 ENCOUNTER — APPOINTMENT (OUTPATIENT)
Dept: MRI IMAGING | Age: 86
DRG: 065 | End: 2021-07-26
Attending: INTERNAL MEDICINE
Payer: MEDICARE

## 2021-07-26 LAB
ALBUMIN SERPL-MCNC: 3.1 G/DL (ref 3.4–5)
ALBUMIN/GLOB SERPL: 0.9 {RATIO} (ref 0.8–1.7)
ALP SERPL-CCNC: 113 U/L (ref 45–117)
ALT SERPL-CCNC: 29 U/L (ref 16–61)
ANION GAP SERPL CALC-SCNC: 3 MMOL/L (ref 3–18)
AST SERPL-CCNC: 17 U/L (ref 10–38)
ATRIAL RATE: 36 BPM
ATRIAL RATE: 52 BPM
BILIRUB DIRECT SERPL-MCNC: 0.2 MG/DL (ref 0–0.2)
BILIRUB SERPL-MCNC: 0.8 MG/DL (ref 0.2–1)
BUN SERPL-MCNC: 22 MG/DL (ref 7–18)
BUN/CREAT SERPL: 33 (ref 12–20)
CALCIUM SERPL-MCNC: 9.5 MG/DL (ref 8.5–10.1)
CALCULATED R AXIS, ECG10: 1 DEGREES
CALCULATED R AXIS, ECG10: 23 DEGREES
CALCULATED T AXIS, ECG11: 114 DEGREES
CALCULATED T AXIS, ECG11: 162 DEGREES
CHLORIDE SERPL-SCNC: 112 MMOL/L (ref 100–111)
CHOLEST SERPL-MCNC: 151 MG/DL
CO2 SERPL-SCNC: 26 MMOL/L (ref 21–32)
CREAT SERPL-MCNC: 0.67 MG/DL (ref 0.6–1.3)
DIAGNOSIS, 93000: NORMAL
DIAGNOSIS, 93000: NORMAL
ECHO AO ROOT DIAM: 3.2 CM
ECHO LA AREA 4C: 33.74 CM2
ECHO LA VOL 2C: 71.51 ML (ref 18–58)
ECHO LA VOL 4C: 133.27 ML (ref 18–58)
ECHO LA VOL BP: 112.38 ML (ref 18–58)
ECHO LA VOL/BSA BIPLANE: 56.47 ML/M2 (ref 16–28)
ECHO LA VOLUME INDEX A2C: 35.93 ML/M2 (ref 16–28)
ECHO LA VOLUME INDEX A4C: 66.97 ML/M2 (ref 16–28)
ECHO LV E' LATERAL VELOCITY: 12.77 CM/S
ECHO LV E' SEPTAL VELOCITY: 8.94 CM/S
ECHO LV INTERNAL DIMENSION DIASTOLIC: 5.91 CM (ref 4.2–5.9)
ECHO LV INTERNAL DIMENSION SYSTOLIC: 4.87 CM
ECHO LV IVSD: 1.36 CM (ref 0.6–1)
ECHO LV MASS 2D: 350.7 G (ref 88–224)
ECHO LV MASS INDEX 2D: 176.2 G/M2 (ref 49–115)
ECHO LV POSTERIOR WALL DIASTOLIC: 1.29 CM (ref 0.6–1)
ECHO LVOT CARDIAC OUTPUT: 5.67 LITER/MINUTE
ECHO LVOT DIAM: 2.24 CM
ECHO LVOT PEAK GRADIENT: 3.02 MMHG
ECHO LVOT PEAK VELOCITY: 86.91 CM/S
ECHO LVOT SV: 65 ML
ECHO LVOT VTI: 16.5 CM
ECHO TV REGURGITANT MAX VELOCITY: 273.65 CM/S
ECHO TV REGURGITANT PEAK GRADIENT: 29.95 MMHG
ERYTHROCYTE [DISTWIDTH] IN BLOOD BY AUTOMATED COUNT: 13.9 % (ref 11.6–14.5)
EST. AVERAGE GLUCOSE BLD GHB EST-MCNC: 123 MG/DL
GLOBULIN SER CALC-MCNC: 3.4 G/DL (ref 2–4)
GLUCOSE BLD STRIP.AUTO-MCNC: 101 MG/DL (ref 70–110)
GLUCOSE BLD STRIP.AUTO-MCNC: 106 MG/DL (ref 70–110)
GLUCOSE BLD STRIP.AUTO-MCNC: 129 MG/DL (ref 70–110)
GLUCOSE BLD STRIP.AUTO-MCNC: 87 MG/DL (ref 70–110)
GLUCOSE SERPL-MCNC: 91 MG/DL (ref 74–99)
HBA1C MFR BLD: 5.9 % (ref 4.2–5.6)
HCT VFR BLD AUTO: 36.1 % (ref 36–48)
HDLC SERPL-MCNC: 48 MG/DL (ref 40–60)
HDLC SERPL: 3.1 {RATIO} (ref 0–5)
HGB BLD-MCNC: 12.2 G/DL (ref 13–16)
LDLC SERPL CALC-MCNC: 85.8 MG/DL (ref 0–100)
LIPID PROFILE,FLP: NORMAL
LVOT MG: 1.58 MMHG
MAGNESIUM SERPL-MCNC: 2.1 MG/DL (ref 1.6–2.6)
MCH RBC QN AUTO: 31.6 PG (ref 24–34)
MCHC RBC AUTO-ENTMCNC: 33.8 G/DL (ref 31–37)
MCV RBC AUTO: 93.5 FL (ref 74–97)
PLATELET # BLD AUTO: 117 K/UL (ref 135–420)
PMV BLD AUTO: 11.1 FL (ref 9.2–11.8)
POTASSIUM SERPL-SCNC: 3.9 MMOL/L (ref 3.5–5.5)
PROT SERPL-MCNC: 6.5 G/DL (ref 6.4–8.2)
Q-T INTERVAL, ECG07: 420 MS
Q-T INTERVAL, ECG07: 476 MS
QRS DURATION, ECG06: 110 MS
QRS DURATION, ECG06: 116 MS
QTC CALCULATION (BEZET), ECG08: 411 MS
QTC CALCULATION (BEZET), ECG08: 450 MS
RBC # BLD AUTO: 3.86 M/UL (ref 4.35–5.65)
SODIUM SERPL-SCNC: 141 MMOL/L (ref 136–145)
TRIGL SERPL-MCNC: 86 MG/DL (ref ?–150)
TSH SERPL DL<=0.05 MIU/L-ACNC: 3.98 UIU/ML (ref 0.36–3.74)
VENTRICULAR RATE, ECG03: 45 BPM
VENTRICULAR RATE, ECG03: 69 BPM
VLDLC SERPL CALC-MCNC: 17.2 MG/DL
WBC # BLD AUTO: 4.3 K/UL (ref 4.6–13.2)

## 2021-07-26 PROCEDURE — 80061 LIPID PANEL: CPT

## 2021-07-26 PROCEDURE — 82962 GLUCOSE BLOOD TEST: CPT

## 2021-07-26 PROCEDURE — 99233 SBSQ HOSP IP/OBS HIGH 50: CPT | Performed by: INTERNAL MEDICINE

## 2021-07-26 PROCEDURE — 83735 ASSAY OF MAGNESIUM: CPT

## 2021-07-26 PROCEDURE — 99222 1ST HOSP IP/OBS MODERATE 55: CPT | Performed by: INTERNAL MEDICINE

## 2021-07-26 PROCEDURE — 97161 PT EVAL LOW COMPLEX 20 MIN: CPT

## 2021-07-26 PROCEDURE — 99222 1ST HOSP IP/OBS MODERATE 55: CPT | Performed by: PSYCHIATRY & NEUROLOGY

## 2021-07-26 PROCEDURE — 65660000000 HC RM CCU STEPDOWN

## 2021-07-26 PROCEDURE — 97535 SELF CARE MNGMENT TRAINING: CPT

## 2021-07-26 PROCEDURE — 93005 ELECTROCARDIOGRAM TRACING: CPT

## 2021-07-26 PROCEDURE — 92610 EVALUATE SWALLOWING FUNCTION: CPT

## 2021-07-26 PROCEDURE — 97165 OT EVAL LOW COMPLEX 30 MIN: CPT

## 2021-07-26 PROCEDURE — 92522 EVALUATE SPEECH PRODUCTION: CPT

## 2021-07-26 PROCEDURE — 84443 ASSAY THYROID STIM HORMONE: CPT

## 2021-07-26 PROCEDURE — 80076 HEPATIC FUNCTION PANEL: CPT

## 2021-07-26 PROCEDURE — 70551 MRI BRAIN STEM W/O DYE: CPT

## 2021-07-26 PROCEDURE — 93306 TTE W/DOPPLER COMPLETE: CPT

## 2021-07-26 PROCEDURE — 74011000250 HC RX REV CODE- 250: Performed by: INTERNAL MEDICINE

## 2021-07-26 PROCEDURE — 36415 COLL VENOUS BLD VENIPUNCTURE: CPT

## 2021-07-26 PROCEDURE — 83036 HEMOGLOBIN GLYCOSYLATED A1C: CPT

## 2021-07-26 PROCEDURE — 74011250637 HC RX REV CODE- 250/637: Performed by: INTERNAL MEDICINE

## 2021-07-26 PROCEDURE — 85027 COMPLETE CBC AUTOMATED: CPT

## 2021-07-26 PROCEDURE — 80048 BASIC METABOLIC PNL TOTAL CA: CPT

## 2021-07-26 RX ORDER — SODIUM CHLORIDE 9 MG/ML
10 INJECTION INTRAMUSCULAR; INTRAVENOUS; SUBCUTANEOUS
Status: COMPLETED | OUTPATIENT
Start: 2021-07-26 | End: 2021-07-26

## 2021-07-26 RX ORDER — FUROSEMIDE 20 MG/1
20 TABLET ORAL DAILY
Status: DISCONTINUED | OUTPATIENT
Start: 2021-07-27 | End: 2021-07-27 | Stop reason: HOSPADM

## 2021-07-26 RX ADMIN — ASPIRIN 325 MG ORAL TABLET 325 MG: 325 PILL ORAL at 10:18

## 2021-07-26 RX ADMIN — DOCUSATE SODIUM 50MG AND SENNOSIDES 8.6MG 1 TABLET: 8.6; 5 TABLET, FILM COATED ORAL at 10:18

## 2021-07-26 RX ADMIN — APIXABAN 5 MG: 5 TABLET, FILM COATED ORAL at 16:59

## 2021-07-26 RX ADMIN — SODIUM CHLORIDE 10 ML: 9 INJECTION INTRAMUSCULAR; INTRAVENOUS; SUBCUTANEOUS at 12:36

## 2021-07-26 NOTE — CONSULTS
Cardiology Consult Note    Consultation request by Shayan Coreas MD for advice/opinion related to evaluating slow afib    Date of  Admission: 7/25/2021  3:50 PM   Primary Care Physician:  Sincere Sahu MD    Consulting Cardiologist: Dr. Angie Vailman:     -TIA vs. CVA, presented due to LUE weakness and L facial droop. CT head 7/25/2021 with large encephalomalacia R parietal lobe c/w old previously hemorrhagic stroke described on studies from 2018.  -Afib with slow ventricular response, asymptomatic, known Hx paroxysmal atrial fibrillation, historically not on oral anticoagulation due to Hx cryptogenic ICH. Not a candidate for cardioversion (chemical or electrical) because he cannot be anticoagulated following cardioversion. -CAD, s/p CABG 09/2010 (LIMA-LAD/D1, SVG-OM, SVG-PDA). On ASA 81 mg and Crestor 20 mg as outpatient. He is not on BB due to Hx bradycardia.  -Cardiomyopathy, Echo 09/2019 with EF 35-40%, historically has not required loop diuretics  -Hyperlipidemia  -Peptic ulcer disease  -Recent open L inguinal hernia repair with plug and patch mesh by Dr. Lydia Abarca 7/23/2021. Primary cardiologist is Dr. Marla Escalante:     -Continue ASA, Crestor.  -Historically not on anticoagulation due to Hx cryptogenic ICH. -Continue to avoid AV arlen blocking agents.  -Pending Echocardiogram as ordered by primary team.    Discussed with patient and patient's family at length about CAD, LV dysfunction, afib and tia-- awaiting neurology evaluation--given high CHADVASc score- if no obvious contraindication, recommend oral anticoagulation. Patient and family waiting to speak to Dr Noe Rojas. History of Present Illness: This is a 82 y.o. male admitted for CVA (cerebral vascular accident) ([facility] [address] [I63.9]  Left arm weakness [R29.898]  Facial droop [R29.810]  Paroxysmal atrial fibrillation ([facility] [address] [I48.0].     Patient complains of: left sided weakness and facial droop       Cardiac risk factors: dyslipidemia, male gender, hypertension      Review of Symptoms:  Except as stated above include:  Constitutional:  negative  Respiratory:  negative  Cardiovascular:  negative  Gastrointestinal: negative  Genitourinary:  negative  Musculoskeletal:  Negative  Neurological:  Left arm weakness - now normal  Dermatological:  Negative  Endocrinological: Negative  Psychological:  Negative    A comprehensive review of systems was negative except for that written in the HPI. 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    BPH     on CT 8/19    CABG     9/10   LIMA - LAD/D1,  SVG - OM,  SVG - PDA    Coronary artery disease     cath 2010 LM 20-30%, LAD 60-70%, Cx 100%, RCA prox 70&, mid 80%, ef 45%    GERD     Hyperlipidemia     Nontraumatic hemorrhage of right cerebral hemisphere ([facility] [address] 05/08/2018    SOH; cryptogenic    Peptic ulcer disease          Social History:     Social History     Socioeconomic History    Marital status:      Spouse name: Not on file    Number of children: 3    Years of education: Not on file    Highest education level: Not on file   Occupational History    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   Tobacco Use    Smoking status: Never Smoker    Smokeless tobacco: Never Used   Substance and Sexual Activity    Alcohol use: No    Drug use: No    Sexual activity: Yes     Partners: Female     Birth control/protection: None     Social Determinants of Health     Financial Resource Strain:     Difficulty of Paying Living Expenses:    Food Insecurity:     Worried About Running Out of Food in the Last Year:     [address] in the Last Year:    Transportation Needs:     Lack of Transportation (Medical):      Lack of Transportation (Non-Medical):    Physical Activity:     Days of Exercise per Week:     Minutes of Exercise per Session:    Stress:     Feeling of Stress :    Social Connections:     Frequency of Communication with Friends and Family:     Frequency of Social Gatherings with Friends and Family:     Attends Christianity Services:     Active Member of Clubs or Organizations:     Attends Club or Organization Meetings:     Marital Status:         Family History:     Family History   Problem Relation Age of Onset    Cancer Mother         liver        Medications:      Allergies   Allergen Reactions    Atorvastatin Myalgia and Other (comments)     Myalgia    Pcn [Penicillins] Other (comments)     Diaphoretic, elevates blood pressure, insomnia    Pravastatin Myalgia and Other (comments)     Myalgia    Amoxicillin Other (comments)     Felt shaky and nervous    Rosuvastatin Other (comments)     Can only take name brand CRESTOR        Current Facility-Administered Medications   Medication Dose Route Frequency    0.9% sodium chloride infusion  75 mL/hr IntraVENous CONTINUOUS    ondansetron (ZOFRAN) injection 4 mg  4 mg IntraVENous Q6H PRN    aspirin tablet 325 mg  325 mg Oral DAILY    acetaminophen (TYLENOL) tablet 650 mg  650 mg Oral Q4H PRN    bisacodyL (DULCOLAX) tablet 5 mg  5 mg Oral DAILY PRN    senna-docusate (PERICOLACE) 8.6-50 mg per tablet 1 Tablet  1 Tablet Oral DAILY    CRESTOR tablet 40 mg (BRAND NAME ONLY)  40 mg Oral DAILY         Physical Exam:     Visit Vitals  /64 (BP 1 Location: Right arm, BP Patient Position: At rest)   Pulse (!) 51   Temp 98 °F (36.7 °C)   Resp 19   Wt 80.7 kg (178 lb)   SpO2 97%   BMI 25.54 kg/m²       TELE: AFIB    BP Readings from Last 3 Encounters:   07/26/21 122/64   12/28/20 132/72   06/23/20 124/66     Pulse Readings from Last 3 Encounters:   07/26/21 (!) 51   12/28/20 (!) 56   06/23/20 72     Wt Readings from Last 3 Encounters:   07/25/21 80.7 kg (178 lb)   12/28/20 80.3 kg (177 lb)   06/23/20 76.5 kg (168 lb 9.6 oz)       General:  alert, cooperative, no distress, appears stated age  Neck:  no JVD  Lungs:  clear to auscultation bilaterally  Heart:  irregularly irregular rhythm  Abdomen:  abdomen is soft without significant tenderness, masses, organomegaly or guarding  Extremities:  extremities normal, atraumatic, no cyanosis or edema  Skin: Warm and dry. no hyperpigmentation, vitiligo, or suspicious lesions  Neuro: alert, oriented x3, affect appropriate, no focal neurological deficits, moves all extremities well, no involuntary movements, reflexes at knee and ankle intact  Psych: non focal     Data Review:     Recent Labs     07/26/21  0349 07/25/21  1550   WBC 4.3* 5.7   HGB 12.2* 12.9*   HCT 36.1 38.1   * 116*     Recent Labs     07/26/21  0349 07/25/21  1550    141   K 3.9 4.1   * 110   CO2 26 25   GLU 91 87   BUN 22* 28*   CREA 0.67 0.96   CA 9.5 9.3   MG 2.1  --    ALB 3.1*  --    ALT 29  --    INR  --  1.1       Results for orders placed or performed during the hospital encounter of 07/25/21   EKG, 12 LEAD, INITIAL   Result Value Ref Range    Ventricular Rate 45 BPM    Atrial Rate 52 BPM    QRS Duration 110 ms    Q-T Interval 476 ms    QTC Calculation (Bezet) 411 ms    Calculated R Axis 1 degrees    Calculated T Axis 162 degrees    Diagnosis       Atrial fibrillation with slow ventricular response with premature ventricular   or aberrantly conducted complexes  Possible Inferior infarct (cited on or before 24-OCT-2013)  Abnormal ECG  When compared with ECG of 25-JUL-2021 16:10,  Vent.  rate has decreased BY  24 BPM         All Cardiac Markers in the last 24 hours:    Lab Results   Component Value Date/Time    TROIQ <0.02 07/25/2021 03:50 PM       Last Lipid:    Lab Results   Component Value Date/Time    Cholesterol, total 151 07/26/2021 03:49 AM    HDL Cholesterol 48 07/26/2021 03:49 AM    LDL, calculated 85.8 07/26/2021 03:49 AM    Triglyceride 86 07/26/2021 03:49 AM    CHOL/HDL Ratio 3.1 07/26/2021 03:49 AM       Cardiographics:     EKG Results     Procedure 720 Value Units Date/Time    Initial ECG [[id number]] Collected: 07/25/21 1610    Order Status: Completed Updated: 07/26/21 0818     Ventricular Rate 69 BPM      Atrial Rate 36 BPM      QRS Duration 116 ms      Q-T Interval 420 ms      QTC Calculation (Bezet) 450 ms      Calculated R Axis 23 degrees      Calculated T Axis 114 degrees      Diagnosis --     Atrial fibrillation  Cannot rule out Inferior infarct , age undetermined  Abnormal ECG    Confirmed by Maria Elena Augustine MD, Mike Caban [id number]) on 7/26/2021 8:17:51 AM      EKG, 12 LEAD, INITIAL [[id number]] Collected: 07/26/21 0107    Order Status: Completed Updated: 07/26/21 0709     Ventricular Rate 45 BPM      Atrial Rate 52 BPM      QRS Duration 110 ms      Q-T Interval 476 ms      QTC Calculation (Bezet) 411 ms      Calculated R Axis 1 degrees      Calculated T Axis 162 degrees      Diagnosis --     Atrial fibrillation with slow ventricular response with premature ventricular   or aberrantly conducted complexes  Possible Inferior infarct (cited on or before 24-OCT-2013)  Abnormal ECG  When compared with ECG of 25-JUL-2021 16:10,  Vent. rate has decreased BY  24 BPM              08/08/11    NM CARDIAC SPECT W STRESS / REST MULT  8/8/2011          XR Results (most recent):  Results from [place] encounter on 01/02/14    XR KNEE RT 3 V    Narrative  Knee, Complete right. CPT CODE: 67298    HISTORY: Difficulty walking. TECHNIQUE: Three views of the right knee. COMPARISON: None. FINDINGS:    There are no fractures. Joint relationships are normal and bony structures  normally mineralized. The medial femoral tibial compartment and patellofemoral  compartments demonstrate moderate narrowing with osteophyte formation. No joint  effusion is seen. No significant soft tissue change. Impression  :    Moderate changes of osteoarthritis.         Signed By: Lashell Menjivar MD    July 26, 2021

## 2021-07-26 NOTE — PROGRESS NOTES
Problem: Motor Speech Impaired (Adult)  Goal: *Acute Goals and Plan of Care (Insert Text)  Description:   Motor Speech Goals:   1. Perform oral motor exercises (with and without resistance) in therapy and at home to increase oral motor strength/range-of-motion for articulation tasks with mod A with visual/verbal cues in 4/5 trials. 7/26/2021 1134 by Simmie Severs A  Outcome: Progressing Towards Goal   Speech LAnguage Pathology evaluation    Patient: Dominique Gil (89 y.o. male)  Date: 7/26/2021  Primary Diagnosis: CVA (cerebral vascular accident) [facility]) [I63.9]  Left arm weakness [R29.898]  Facial droop [R29.810]  Paroxysmal atrial fibrillation ([address] [I48.0]        Precautions: aspiration   (standard)    ASSESSMENT :  Based on the objective data described below, the patient presents with min left orofacial droop/weakness. Speech production was fluent; no dysarthria observed. Expressive/receptive speech production appeared WFL to informal observation. Pt communicated in sentences of appropriate form, content, and use. Social conversation appropriate. Reviewed oral motor exercises with provided handout for improved orolingual strength/ROM. ST will continue to follow for further treatment to hopefully resolve facial droop/weakness. Patient will benefit from skilled intervention to address the above impairments. Patients rehabilitation potential is considered to be Good  Factors which may influence rehabilitation potential include:   [x]              None noted  []              Mental ability/status  []              Medical condition  []              Home/family situation and support systems  []              Safety awareness  []              Pain tolerance/management  []              Other:      PLAN :  Recommendations and Planned Interventions: See above  Frequency/Duration: Patient will be followed by speech-language pathology 1-2 times per day/2-3 days per week to address goals.   Discharge Recommendations: Outpatient and To Be Determined     SUBJECTIVE:   Patient stated That sounds like a good plan. 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     Dr Fung Confer GRAFT  09/2010    LIMA - LAD/D1,  SVG - OM,  SVG - PDA    HX HERNIA REPAIR  1974    [facility]     Prior Level of Function/Home Situation: see below  Home Situation  Home Environment: Private residence  One/Two Story Residence: One story  Living Alone: No (currently living with daughter)  Support Systems: Child(mamie), Spouse/Significant Other/Partner  Patient Expects to be Discharged to[de-identified] [employer]  Current DME Used/Available at Home: None  Tub or Shower Type: Tub/Shower combination  Mental Status:  Neurologic State: Alert  Orientation Level: Oriented X4  Cognition: Follows commands  Perception: Appears intact  Perseveration: No perseveration noted  Safety/Judgement: Awareness of environment  Motor Speech:  Oral-Motor Structure/Motor Speech  Labial: Left droop  Dentition: Natural;Intact  Oral Hygiene: adequate  Lingual: No impairment  Velum: No impairment  Mandible: No impairment  Apraxic Characteristics: None  Dysarthric Characteristics: None  Intelligibility: No impairment  Intonation: WFL  Rate: WFL  Prosody: WFL  Overall Impairment Severity: None  Language Comprehension and Expression: see above  Voice:  Vocal Quality: No impairment      The severity rating is based on the following outcomes:    Clinical judgment     PAIN:  Pt reports 0/10 pain or discomfort prior to evaluation. Pt reports 0/10 pain or discomfort post evaluation. After treatment:   []              Patient left in no apparent distress sitting up in chair  [x]              Patient left in no apparent distress in bed  [x]              Call bell left within reach  [x]              Nursing notified  []              Caregiver present  []              Bed alarm activated    COMMUNICATION/EDUCATION:   [x] Patient educated regarding oral motor exercises  [x] Patient/family have participated as able in goal setting and plan of care. []  Patient/family agree to work toward stated goals and plan of care. []  Patient understands intent and goals of therapy, but is neutral about his/her participation. []  Patient is unable to participate in goal setting and plan of care.     Thank you for this referral.    Nelly Russell M.S. CCC-SLP/L  Speech-Language Pathologist

## 2021-07-26 NOTE — PROGRESS NOTES
Problem: Dysphagia (Adult)  Goal: *Acute Goals and Plan of Care (Insert Text)  Description:     Patient will:  1. Tolerate PO trials with 0 s/s overt distress in 4/5 trials  2. Utilize compensatory swallow strategies/maneuvers (decrease bite/sip, size/rate, alt. liq/sol) with min cues in 4/5 trials    Rec:     Regular solid with thin liquids  Aspiration precautions  HOB >45 during po intake, remain >30 for 30-45 minutes after po   Small bites/sips; alternate liquid/solid with slow feeding rate   Oral care TID  Meds per pt preference  Outcome: Progressing Towards Goal   SPEECH LANGUAGE PATHOLOGY BEDSIDE SWALLOW EVALUATION    Patient: Annabel Starr (82 y.o. male)  Date: 7/26/2021  Primary Diagnosis: CVA (cerebral vascular accident) ([facility] [address] [I63.9]  Left arm weakness [R29.898]  Facial droop [R29.810]  Paroxysmal atrial fibrillation ([facility] [address] [I48.0]        Precautions: aspiration     PLOF: As per H&P    ASSESSMENT :  Based on the objective data described below, the patient presents with oropharyngeal swallow fxn essentially WFL. Strength, ROM, and coordination of the orofacial musculature were all found to be [facility]. Min left orofacial droop/weakness observed. Pt tolerated reg solid, puree, and thin liquids +/- straw consecutive swallows without any overt s/sx of aspiration. Mastication was appropriate with timely a-p transit. Positive oral clearance observed across all trials. Pt safe for initiation of reg solid diet with thin liquids, aspiration precautions, oral care TID, and meds as tolerated. ST will continue to follow x 1-2 visits to ensure safety of diet tolerance. Patient will benefit from skilled intervention to address the above impairments.   Patient's rehabilitation potential is considered to be Good  Factors which may influence rehabilitation potential include:   [x]            None noted  []            Mental ability/status  []            Medical condition  []            Home/family situation and support systems  []            Safety awareness  []            Pain tolerance/management  []            Other:      PLAN :  Recommendations and Planned Interventions: See above  Frequency/Duration: Patient will be followed by speech-language pathology x 1-2 more visits to address goals. Discharge Recommendations: None     SUBJECTIVE:   Patient stated I have my own garden where I grow my own plants with well water, not city water. 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     Dr Mick Aguila    [address] GRAFT  09/2010    LIMA - LAD/D1,  SVG - OM,  SVG - PDA    HX HERNIA REPAIR  1974    [facility]     Prior Level of Function/Home Situation: see below  Home Situation  Home Environment: Private residence  One/Two Story Residence: One story  Living Alone: No (currently living with daughter)  Support Systems: Child(mamie), Spouse/Significant Other/Partner  Patient Expects to be Discharged to[de-identified] House  Current DME Used/Available at Home: None    Diet prior to admission: regular solid with thin  Current Diet:  regular solid with thin      Cognitive and Communication Status:  Neurologic State: Alert  Orientation Level: Oriented X4  Cognition: Follows commands  Perception: Appears intact  Perseveration: No perseveration noted  Safety/Judgement: Home safety, Awareness of environment  Oral Assessment:  Oral Assessment  Labial: Left droop  Dentition: Natural;Intact  Oral Hygiene: adequate  Lingual: No impairment  Velum: No impairment  Mandible: No impairment  P.O. Trials:  Patient Position: 90 in chair at bedside  Vocal quality prior to P.O.: No impairment  Consistency Presented: Thin liquid; Solid;Puree  How Presented: Self-fed/presented;Cup/sip;Spoon;Straw;Successive swallows  Bolus Acceptance: No impairment  Bolus Formation/Control: No impairment  Propulsion: No impairment  Oral Residue: None  Initiation of Swallow: No impairment  Laryngeal Elevation: Functional  Aspiration Signs/Symptoms: None  Pharyngeal Phase Characteristics: No impairment, issues, or problems   Effective Modifications: None  Cues for Modifications: None     Oral Phase Severity: No impairment  Pharyngeal Phase Severity : No impairment    PAIN:  Start of Eval: 0  End of Eval: 0     After treatment:   []            Patient left in no apparent distress sitting up in chair  [x]            Patient left in no apparent distress in bed  [x]            Call bell left within reach  [x]            Nursing notified  []            Family present  []            Caregiver present  []            Bed alarm activated    COMMUNICATION/EDUCATION:   [x]            Aspiration precautions; swallow safety; compensatory techniques. [x]            Patient/family have participated as able in goal setting and plan of care. []            Patient/family agree to work toward stated goals and plan of care. []            Patient understands intent and goals of therapy; neutral about participation. []            Patient unable to participate in goal setting/plan of care; educ ongoing with interdisciplinary staff  [x]         Posted safety precautions in patient's room.     Thank you for this referral.    Chayo Lares M.S. CCC-SLP/L  Speech-Language Pathologist

## 2021-07-26 NOTE — PROGRESS NOTES
1920-Attempt made to obtain verbal report on patient as patient arrived onto unit. Pt appears to be alert and oriented. Daughter arrived to bedside after patient was transferred to bed. Pt oriented to unit and room, assessment completed upon arrival. Allergy armband applied    2200-MRI screening form completed and faxed. On-call MRI was paged by . Awaiting return call from .

## 2021-07-26 NOTE — ROUTINE PROCESS
The NIHSS q4 hours is discontinued as neurology has signed off. However, with any acute neurological changes, the nurse should do an NIHSS, contact the doctor, and resume floor protocol for CVA/TIA patients.

## 2021-07-26 NOTE — PROGRESS NOTES
Problem: Self Care Deficits Care Plan (Adult)  Goal: *Acute Goals and Plan of Care (Insert Text)  Outcome: Resolved/Met       OCCUPATIONAL THERAPY EVALUATION/DISCHARGE    Patient: Nathanael Sanders (80 y.o. male)  Date: 7/26/2021  Primary Diagnosis: CVA (cerebral vascular accident) ([facility] [I63.9]  Left arm weakness [R29.898]  Facial droop [R29.810]  Paroxysmal atrial fibrillation ([facility] [I48.0]  Precautions:   (standard)  PLOF: Patient was independent with self-care and functional mobility PTA. Patient reports having DME for wife, who has alzheimers but does not use anything himself. Patient reports he has been staying with his daughter since Friday when he had a hernia repair sx. ASSESSMENT AND RECOMMENDATIONS:  Patient cleared to participate in OT evaluation by RN. Upon entering the room, patient was getting OOB to go restroom with CNA present and agreeable to participate in OT evaluation. Patient educated on the role of OT, evaluation process, and safety during this admission with patient verbalizing understanding. Patient performed selfcare tasks standing at sink with good balance and demos good balance with basic self-care seated as well. Patient modified independent with functional transfers using no AD, however instructed to still inform nursing staff if getting up and moving around room. Transport present in room to take patient for MRI, Patient modified independent with bed mobility. Patient with reports of admission symptoms resolving, no further selfcare concerns. Based on the objective data described below, the patient presents with no deficits that impede pt function with ADLs, functional transfers, and functional mobility. At this time patient is safe to d/c home with family support. OT to d/c from caseload at this time. Skilled occupational therapy is not indicated at this time.   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 Dr Natali Rivero GRAFT  09/2010    LIMA - LAD/D1,  SVG - OM,  SVG - PDA    HX HERNIA REPAIR  1974    [facility]     Barriers to Learning/Limitations: None  Compensate with: visual, verbal, tactile, kinesthetic cues/model    Home Situation:   Home Situation  Home Environment: Private residence  One/Two Story Residence: One story  Living Alone: No (currently living with daughter)  Support Systems: Child(mamie), Spouse/Significant Other/Partner  Patient Expects to be Discharged to[de-identified] House  Current DME Used/Available at Home: None  Tub or Shower Type: Tub/Shower combination  [x]     Right hand dominant   []     Left hand dominant    Cognitive/Behavioral Status:  Neurologic State: Alert  Orientation Level: Oriented X4  Cognition: Follows commands  Safety/Judgement: Awareness of environment    Skin: Intact  Edema: None noted    Vision/Perceptual:    Acuity: Within Defined Limits      Coordination: BUE  Fine Motor Skills-Upper: Left Intact; Right Intact    Gross Motor Skills-Upper: Left Intact; Right Intact    Balance:  Sitting: Intact  Standing: Intact  Standing - Static: Good  Standing - Dynamic : Good    Strength: BUE    Strength: Generally decreased, functional       Tone & Sensation: BUE    Tone: Normal  Sensation: Intact          Range of Motion: BUE    AROM: Within functional limits            Functional Mobility and Transfers for ADLs:  Bed Mobility:     Supine to Sit: Modified independent  Sit to Supine: Modified independent  Scooting: Modified independent  Transfers:  Sit to Stand: Modified independent  Stand to Sit: Modified independent             ADL Assessment:  Feeding: Independent    Oral Facial Hygiene/Grooming: Independent    Bathing: Independent    Upper Body Dressing: Independent    Lower Body Dressing: Independent    Toileting: Independent      ADL Intervention:  Grooming  Grooming Assistance: Independent  Position Performed: Standing (at sink)  Washing Face: Independent  Washing Hands: Independent  Brushing Teeth: Independent    Upper Body Dressing Assistance  Dressing Assistance: Independent  Pullover Shirt: Independent    Lower Body Dressing Assistance  Dressing Assistance: Independent  Pants With Elastic Waist: Independent (pajama pants)  Socks: Independent  Leg Crossed Method Used: Yes  Position Performed: Seated in chair;Standing    Toileting  Toileting Assistance: Independent  Bladder Hygiene: Independent    Cognitive Retraining  Safety/Judgement: Awareness of environment      Pain:  Pain level pre-treatment: 0/10   Pain level post-treatment: 0/10   Pain Intervention(s): Medication (see MAR); Response to intervention: Nurse notified, See doc flow    Activity Tolerance:   Good    Please refer to the flowsheet for vital signs taken during this treatment. After treatment:   []  Patient left in no apparent distress sitting up in chair  [x]  Patient left in no apparent distress in bed  [x]  Call bell left within reach  [x]  Nursing notified  [x]  Transport present  []  Bed alarm activated    COMMUNICATION/EDUCATION:   [x]      Role of Occupational Therapy in the acute care setting  [x]      Home safety education was provided and the patient/caregiver indicated understanding.   [x] Patient/family have participated as able and agree with findings and recommendations. []      Patient is unable to participate in plan of care at this time. Thank you for this referral.  Genevieve Pickett, OTR/L  Time Calculation: 23 mins      Eval Complexity: History: MEDIUM Complexity : Expanded review of history including physical, cognitive and psychosocial  history ; Examination: LOW Complexity : 1-3 performance deficits relating to physical, cognitive , or psychosocial skils that result in activity limitations and / or participation restrictions ;    Decision Making:LOW Complexity : No comorbidities that affect functional and no verbal or physical assistance needed to complete eval tasks

## 2021-07-26 NOTE — PROGRESS NOTES
Bedside shift change report given to [address] (oncoming nurse) by Ann Sanches (offgoing nurse). Report included the following information SBAR, ED Summary, MAR, Recent Results, Cardiac Rhythm AFIB with HR currently 50-60s and Dual Neuro Assessment. Patient going down to MRI.

## 2021-07-26 NOTE — PROGRESS NOTES
Hospitalist Progress Note    Patient: Axel Rg Age: 82 y.o. : 1935 MR#: [id number] SSN: [id number]  Date/Time: 2021 12:07 PM    DOA: 2021  PCP: Yuki Guerin MD    Subjective:     His left upper extremity weakness improved, left facial droop improved   MRI brain with confirmed acute embolic stroke, no other suspicious concern   Neurology recommending [address] [place] [address] ASAP. Cardiology evaluated and agreed [address] [place] [address] if no contraindication     Daughter spoke with Dr. Shirley Freeman who stated that if patient has no indication of amyloid then ok with anticoagulation. (Daughter [phone])       Interval Hospital Course:        ROS: No current fever/chills, no headache, no dizziness, no facial pain, no sinus congestion,   No swallowing pain, No chest pain, no palpitation, no shortness of breath, no abd pain,  No diarrhea, no urinary complaint, no leg pain or swelling      Assessment/Plan:   1. Acute ischemia of right parietal, highly suspicious of embolic stroke        Improved left upper extremity hemiplegia, improved left facial droop   2. Previous large chronic right parieto-occipital cortical infarction, associated previous hemorrhagic stroke, h/o cryptogenic ICH   3. Paroxysmal AFIB with slow ventricular response, asymptomatic, (avoid arlen blocking agent)   4. CAD with CABG 2010  5. Ischemic cardiomyopathy, EF 40%   6. Hyperlipidemia   7. PUD   8. Recent L. Inguinal hernia repair       Appreciate neurologist consult and recommendation. Spoke with his daughter [phone]) and agreed on [address] [place] [address] ASAP. Start Eliquis 5mg BID today. Will have close monitor for tonight   Repeat CT head if changed in mentation   Continue ASA and Crestor   Cardiology consult appreciated.  Avoid AV arlen blocking agents   Resume her home dose of lasix   Cont pericolace   PT/OT/SPT   ICS    Full code   Disposition: D/C home tomorrow     Additional Notes:    Time spent >35 minutes    Case discussed with:  [x]Patient  [x]Family [x]Nursing  [x]Case Management  DVT Prophylaxis:  []Lovenox  [x]Eliquis  []SCDs  []Coumadin   []On Heparin gtt    Signed By: Anahi Wilson MD     2021 12:07 PM              Objective:   VS:   Visit Vitals  BP (!) 145/75   Pulse 64   Temp 97.5 °F (36.4 °C)   Resp 19   Ht 5' 10\" (1.778 m)   Wt 80.7 kg (178 lb)   SpO2 94%   BMI 25.54 kg/m²      Tmax/24hrs: Temp (24hrs), Av.9 °F (36.6 °C), Min:97.2 °F (36.2 °C), Max:98.8 °F (37.1 °C)      Intake/Output Summary (Last 24 hours) at 2021 1207  Last data filed at 2021 6801  Gross per 24 hour   Intake 240 ml   Output 1000 ml   Net -760 ml       Tele:   General:  Cooperative, Not in acute distress, speaks in full sentence while in bed  HEENT: PERRL, EOMI, supple neck, no JVD, dry oral mucosa  Cardiovascular: S1S2 regular, no rub/gallop   Pulmonary: Clear air entry bilaterally, no wheezing, no crackle  GI:  Soft, non tender, non distended, +bs, no guarding   Extremities:  No pedal edema, +distal pulses appreciated   Neuro: AOx3, moving all extremities, 4/5 strength in LUE at proximal/distal end    Additional:       Current Facility-Administered Medications   Medication Dose Route Frequency    0.9% NaCl bacteriostatic (NORMAL SALINE) 0.9 % injection 10 mL  10 mL IntraVENous CARD ONCE    0.9% sodium chloride infusion  75 mL/hr IntraVENous CONTINUOUS    ondansetron (ZOFRAN) injection 4 mg  4 mg IntraVENous Q6H PRN    aspirin tablet 325 mg  325 mg Oral DAILY    acetaminophen (TYLENOL) tablet 650 mg  650 mg Oral Q4H PRN    bisacodyL (DULCOLAX) tablet 5 mg  5 mg Oral DAILY PRN    senna-docusate (PERICOLACE) 8.6-50 mg per tablet 1 Tablet  1 Tablet Oral DAILY    CRESTOR tablet 40 mg (BRAND NAME ONLY)  40 mg Oral DAILY            Lab/Data Review:  Labs: Results:       Chemistry Recent Labs     21  0349 21  1550   GLU 91 87    141   K 3.9 4.1   * 110   CO2 26 25   BUN 22* 28*   CREA 0.67 0.96   BUCR 33* 29*   AGAP 3 6   CA 9.5 9.3 Recent Labs     07/26/21  0349   ALT 29   TP 6.5   ALB 3.1*   GLOB 3.4   AGRAT 0.9      CBC w/Diff Recent Labs     07/26/21  0349 07/25/21  1550 07/25/21  1550   WBC 4.3*  --  5.7   RBC 3.86*  --  4.11*   HGB 12.2*  --  12.9*   HCT 36.1  --  38.1   MCV 93.5   < > 92.7   MCH 31.6   < > 31.4   MCHC 33.8   < > 33.9   RDW 13.9   < > 14.1   *  --  116*   GRANS  --   --  55   LYMPH  --   --  29   EOS  --   --  1    < > = values in this interval not displayed. Coagulation Recent Labs     07/25/21  1550   PTP 13.9   INR 1.1       Iron/Ferritin No results found for: IRON, FE, TIBC, IBCT, PSAT, FERR    BNP    Cardiac Enzymes Lab Results   Component Value Date/Time    CK 64 03/28/2016 02:15 AM    CK - MB 1.1 03/28/2016 02:15 AM    CK-MB Index 1.7 03/28/2016 02:15 AM    Troponin-I, QT <0.02 07/25/2021 03:50 PM        Lactic Acid    Thyroid Studies          All Micro Results     None            Images:    CT (Most Recent). CT Results (most recent):  Results from [place] encounter on 07/25/21    CTA HEAD NECK W CONT    Narrative  EXAM: CTA HEAD NECK W CONT    CLINICAL INDICATIONS: Stroke work-up, left upper extremity symptoms    TECHNIQUE: Helical CT scan of the brain and neck were performed at 1.25 mm  intervals during rapid IV bolus contrast administration. These data were  reconstructed at .625 mm intervals for vascular analysis. The data was also  reviewed at 2.5 mm intervals for accompanying soft tissue analysis. 3D post  processed images, including surface shaded displays, were produced for this exam  on independent console, permanently archived and interpreted.     All CT scans at this facility are performed using dose optimization technique as  appropriate to a performed exam, to include automated exposure control,  adjustment of the MA and/or kV according to patient size (including appropriate  matching for site-specific examinations) or use of  iterative reconstruction  technique. CONTRAST: [address] 370    COMPARISON: Same day head CT    CTA SOFT TISSUE ANALYSIS:  Lungs: Pleural plaques with calcification identified in the anterior thoracic  surface bilaterally consistent with probable prior asbestos exposure  Upper chest: Patient is undergone prior CABG procedure  Neck: Thyroid appears normal there are no visceral space masses  Lymph nodes: No adenopathy  Orbits: Unremarkable  Paranasal sinuses: Small amount of retained secretions in the floor of the left  maxillary sinus  Brain: Evidence for old right parietal infarction with expansion of the  occipital horn on the right side  Bones: Patient is absent several teeth in the maxillary and mandibular region    There is a scoliotic curvature convexity to the right of the cervical spine with  marked hypertrophy of the facets multiple levels intradiscal degenerative  changes are also present there is no central canal stenosis there are no  destructive lesions to suggest osseous metastasis    CTA NECK VASCULAR ANALYSIS:    Aortic arch: Left sided with typical configuration. Atherosclerotic elongation  type II arch    Innominate: Patent    Right Subclavian: Patent  Left Subclavian: Patent    Right carotid: There is redundancy in the proximal portion of the internal  carotid no stenosis seen  -CCA: Patent  -ECA: Patent  -ICA: Patent. Estimated less than 10%% stenosis of proximal ICA by NASCET  criteria. Left carotid:  -CCA: Patent  -ECA: Patent  -ICA: Patent Estimated less than 10%% stenosis of proximal ICA by NASCET  criteria.     Right vertebral: Patent    Left vertebral: Patent codominant      CTA BRAIN VASCULAR ANALYSIS:    Right anterior circulation:  -ICA: Patent  -YADIEL: Patent  -MCA: There is asymmetric decrease in the M3 and M4 branches in the region of  the previous infarction in the right parietal lobe    Left anterior circulation:  -ICA: Patent  -YADIEL: Patent  -MCA: Patent    -ACOM: Unremarkable  -PCOM: Not visualized    Posterior circulation:  -RVA: Patent  -LVA: The section 4 superiorly is intact but small compared to the dominant  right segment  -Basilar: Patent  -Right PCA: Patent  -Left PCA: Patent    Major dural venous sinuses: Unremarkable    Impression  1. Patent intra- and extracranial brain arteries. COPD with pleural plaques partially calcified question asbestos exposure    Prior coronary artery bypass    Extensive multilevel cervical spondylosis with facet arthropathy and scoliotic  curvature    A preliminary wet reading was submitted July 25 at 17:31 hours         MRI (Most Recent) MRI Results (most recent):  Results from Hospital Encounter encounter on 07/25/21    MRI BRAIN WO CONT    Narrative  Brain MR without contrast    History: Left upper extremity weakness and left facial droop    Comparison: CT head 7/25/2021    Technique: Multisequence multiplanar MR imaging acquired through the brain. Includes diffusion imaging and heme sensitive imaging. Contrast used: None    Findings:    Parenchyma: Punctate focus of right parietal subcortical white matter acute  ischemia in the centrum semiovale. No additional acute ischemia. Chronic large  right parieto-occipital cortical infarct with encephalomalacia and evidence of  prior hemorrhage. No acute hemorrhage. No mass lesion. Punctate susceptibility artifact in the deep periventricular white matter right  parietal lobe and left occipital lobe, nonspecific and probably sequelae of  prior hemorrhage. CSF spaces: Ventricles and cisterns remain midline in position. Chronic ex vacuo  dilatation of the posterior right lateral ventricle and right occipital horn    IAC regions: Unremarkable    Parasellar region: Unremarkable    Vasculature: Appropriate flow voids within the major skull base vasculature. Cervicomedullary junction: Patent    Orbits: Unremarkable    Paranasal sinuses and mastoid air cells: Mild mucosal thickening in the ethmoid  sinus.  No fluid in the sinuses. Otherwise clear sinuses and mastoid air cells. Calvarium and upper cervical spine: Unremarkable    Impression  1. Punctate focus of acute ischemia in the right parietal centrum semiovale  subcortical white matter. 2. Large chronic right parieto-occipital cortical infarction. EKG No results found for this or any previous visit. 2D ECHO 07/25/21    ECHO ADULT COMPLETE 07/26/2021 7/26/2021    Interpretation Summary  · LV: Estimated LVEF is 35 - 40%. Visually measured ejection fraction. Mildly dilated left ventricle. Mild concentric hypertrophy. Moderate (grade 2) left ventricular diastolic dysfunction. · PA: Pulmonary hypertension. Pulmonary arterial systolic pressure is 37 mmHg. · IVC: Mildly elevated central venous pressure (8 mmHg); IVC diameter is less than 21 mm and collapses less than 50% with respiration. · LA: Severely dilated left atrium. Left Atrium volume index is 55 mL/m2. · RA: Dilated right atrium. · MV: Mild mitral valve regurgitation is present. · AV: Aortic valve leaflet calcification present. · IAS: Agitated saline contrast study was performed. There was no shunting with Valsalva. · Saline contrast was given to evaluate for intracardiac shunt.     Signed by: Ingrid Rodriguez MD on 7/26/2021  2:48 PM

## 2021-07-26 NOTE — PROGRESS NOTES
ARU/IPR REFERRAL CONTACT NOTE  [address] [facility]    RE:  Genevieve Net    Referral received to review this patient's case for admission to [address] [facility]. Current status reviewed.  When appropriate, will need PT/OT/ST evaluation/treatment on this patient to complete the pre-admission evaluation.  Will continue to follow. Thank you for this referral.  Should you have any questions please do not hesitate to call. Sincerely,  Donnell Cruz [facility]  [phone]

## 2021-07-26 NOTE — PROGRESS NOTES
PHYSICAL THERAPY EVALUATION AND DISCHARGE    Patient: Nathanael Sanders (3 y.o. male)  Date: 7/26/2021  Primary Diagnosis: CVA (cerebral vascular accident) [facility]) [I63.9]  Left arm weakness [R29.898]  Facial droop [R29.810]  Paroxysmal atrial fibrillation ([facility] [I48.0]        Precautions: standard       PLOF: Patient is independent with self care and functional mobility. He lives with spouse in single story home, currently staying with daughter. ASSESSMENT :  Based on the objective data described below, the patient is at his baseline level of mobility. Patient presents with good BLE strength and good balance. He is independent with all functional mobility, including transfers and gait without AD. He has steady gait with no significant gait deviations. Patient does not require further skilled intervention at this level of care and will complete PT order. PLAN :  Recommendations and Planned Interventions:   No formal PT needs identified at this time. Discharge Recommendations: None  Further Equipment Recommendations for Discharge: N/A     SUBJECTIVE:   Patient stated We are staying with my daughter, I had hernia surgery last week. Juan Thomas    OBJECTIVE DATA SUMMARY:     Past Medical History:   Diagnosis Date    Atrial fibrillation ([facility]     Chads2 2; no OAC due to h/o cryptogenic ICH    BPH     on CT 8/19    CABG     9/10   LIMA - LAD/D1,  SVG - OM,  SVG - PDA    Coronary artery disease     cath 2010 LM 20-30%, LAD 60-70%, Cx 100%, RCA prox 70&, mid 80%, ef 45%    GERD     Hyperlipidemia     Nontraumatic hemorrhage of right cerebral hemisphere ([facility] 05/08/2018    [address] [facility]; cryptogenic    Peptic ulcer disease      Past Surgical History:   Procedure Laterality Date    HX APPENDECTOMY      HX CHOLECYSTECTOMY  09/03/2019    [address] [facility] [address] Zaria Kwon COLONOSCOPY  2014?     Dr Macedo Turpin GRAFT  09/2010    LIMA - LAD/D1,  SVG - OM,  SVG - PDA    HX HERNIA REPAIR  1974    [facility]     Barriers to Learning/Limitations: None  Compensate with: N/A  Home Situation:   Home Situation  Home Environment: Private residence  One/Two Story Residence: One story  Living Alone: No (currently living with daughter)  Support Systems: Child(mamie), Spouse/Significant Other/Partner  Patient Expects to be Discharged to[de-identified] [employer]  Current DME Used/Available at Home: None  Critical Behavior:  Neurologic State: Alert  Orientation Level: Oriented X4  Cognition: Follows commands  Safety/Judgement: Home safety; Awareness of environment  Psychosocial  Patient Behaviors: Calm; Cooperative                 Strength BLE:    Strength: Within functional limits        Tone & Sensation BLE:   Tone: Normal  Sensation: Intact           Range Of Motion BLE:  AROM: Within functional limits           Functional Mobility:       Transfers:  Sit to Stand: Modified independent  Stand to Sit: Modified independent          Balance:   Sitting: Intact  Standing: Intact  Standing - Static: Good  Standing - Dynamic : Good    Ambulation/Gait Training:  Distance (ft): 25 Feet (ft)  Ambulation - Level of Assistance: Modified independent  Gait Description (WDL): Within defined limits         Pain:  Pain level pre-treatment: 0/10   Pain level post-treatment: 0/10  Pain Intervention(s): Medication (see MAR); Rest, Ice, Repositioning   Response to intervention: Nurse notified, See doc flow    Activity Tolerance:   Good  Please refer to the flowsheet for vital signs taken during this treatment. After treatment:   [x]         Patient left in no apparent distress sitting up in chair  []         Patient left in no apparent distress in bed  [x]         Call bell left within reach  [x]         Nursing notified  []         Caregiver present  []         Bed alarm activated  []         SCDs applied    COMMUNICATION/EDUCATION:   [x]         Role of Physical Therapy in the acute care setting.   [x]         Fall prevention education was provided and the patient/caregiver indicated understanding. []         Patient/family have participated as able in goal setting and plan of care. []         Patient/family agree to work toward stated goals and plan of care. []         Patient understands intent and goals of therapy, but is neutral about his/her participation. []         Patient is unable to participate in goal setting/plan of care: ongoing with therapy staff.  []         Other:     Thank you for this referral.  Lucrecia Wu, PT   Time Calculation: 14 mins      Eval Complexity: History: LOW Complexity : Zero comorbidities / personal factors that will impact the outcome / POCExam:LOW Complexity : 1-2 Standardized tests and measures addressing body structure, function, activity limitation and / or participation in recreation  Presentation: LOW Complexity : Stable, uncomplicated  Clinical Decision Making:Low Complexity    Overall Complexity:LOW

## 2021-07-26 NOTE — CONSULTS
NEUROLOGY CONSULT NOTE    Patient ID:  Ray Llanes  [id number]  34 y.o.  1935    Date of Consultation:  July 26, 2021    Referring Physician: Dr Sade Dobbins    Reason for Consultation:  Transient left arm weakness        Subjective:       History of Present Illness:     Ray Llanes is a 82 y.o.  male who presented to the emergency room via EMS for further evaluation and management of left upper extremity weakness associated with left facial droop started around 3 PM.  Patient was sitting on couch and all of a sudden he started losing his . His daughter gave him 325 mg aspirin prior to calling EMS. Patient had left inguinal hernia repair done by Dr. Marsha Núñez on this Friday and did not take aspirin on Friday as well as Saturday. Patient also mentioned that he has history of paroxysmal atrial fibrillation and under care of Dr. Cosme Dixon but has not been advised to take any anticoagulation due to increased risk of bleeding.     Patient is feeling much better currently. No headaches or dizziness. No visual disturbances. No dysphagia. No soreness of throat. Denies any chest pain or shortness of breath or palpitation or cough. He did not have any up symptoms prior to this event. No nausea or vomiting. Has had no bowel movement for last 2 days since surgery but no abdominal pain. His wife accidentally sat on his left thigh yesterday and had some bleeding but nothing unusual.  Denies any tingling or numbness currently. His left hand weakness is much better currently. No recent change in medications. He was just taking ibuprofen for pain management at home. He has been on aspirin and Crestor. Patient says he cannot take anything to substitute his Crestor as he is allergic to it. Denies smoking cigarettes or drinking alcohol. He has been  for 61 years. He is currently living with his daughter since surgery as his wife has Alzheimer's dementia.   Patient's past medical history includes coronary artery disease and bypass surgery, ruptured appendix while he was 27years old, stroke in 2018 and paroxysmal atrial fibrillation.       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    Atrial fibrillation ([facility] 12/24/2020    History of intracranial hemorrhage 06/23/2020    Advance directive discussed with patient 03/08/2017    Hyperlipidemia 03/08/2017    Coronary artery disease     GERD (gastroesophageal reflux disease)      Past Medical History:   Diagnosis Date    Atrial fibrillation (HCC)     Chads2 2; 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 Dr Artis Coy GRAFT  09/2010    LIMA - LAD/D1,  SVG - OM,  SVG - PDA    [facility]ERNIA REPAIR  1974    [facility]      Prior to Admission medications    Medication Sig Start Date End Date Taking? Authorizing Provider   Crestor 20 mg tablet TAKE ONE TABLET BY MOUTH ONCE NIGHTLY 2/23/21  Yes Alyssia May MD   CHOLECALCIFEROL, VITAMIN D3, PO Take 5,000 Units by mouth. Yes Provider, Historical   VITAMIN B COMPLEX PO Take  by mouth. Yes Provider, Historical   aspirin 81 mg tablet Take 81 mg by mouth daily. Yes Provider, Historical   MULTIVITAMIN PO Take  by mouth daily. Yes Provider, Historical   furosemide (LASIX) 20 mg tablet Take  by mouth daily.   Patient not taking: Reported on 7/26/2021    Provider, Historical   ondansetron (ZOFRAN ODT) 4 mg disintegrating tablet Take 4 mg by mouth. 9/2/19   Provider, Historical     Allergies   Allergen Reactions    Atorvastatin Myalgia and Other (comments)     Myalgia    Pcn [Penicillins] Other (comments)     Diaphoretic, elevates blood pressure, insomnia    Pravastatin Myalgia and Other (comments)     Myalgia    Amoxicillin Other (comments)     Felt shaky and nervous    Rosuvastatin Other (comments)     Can only take name brand CRESTOR      Social History     Tobacco Use    Smoking status: Never Smoker    Smokeless tobacco: Never Used   Substance Use Topics    Alcohol use: No      Family History   Problem Relation Age of Onset    Cancer Mother         liver              Review of Systems    Pertinent items are noted in HPI. Objective:     Patient Vitals for the past 8 hrs:   BP Temp Pulse Resp SpO2 Height Weight   07/26/21 1202 (!) 145/75     5' 10\" (1.778 m) 80.7 kg (178 lb)   07/26/21 1141 (!) 145/75 97.5 °F (36.4 °C) 64 19 94 %     07/26/21 0925 123/73 98.8 °F (37.1 °C) (!) 56 19 96 %     07/26/21 0600 122/64 98 °F (36.7 °C) (!) 51 19 97 %         General Exam  No acute distress, normal body habitus    HEENT: Normocephalic, atraumatic, Sclera anicteric, normal conjunctiva  Mucous membranes: normal color and hydration   Lungs: clear. Skin: no lesions no rashes, normal color  CV: Heart: irregular to rate and rhythm.  No murmurs     Neurologic Exam:    Mental status:  Alert, oriented to person, place, time and circumstance  No visual spatial neglect or overt apraxia    Language: normal fluency and comprehension    Cranial nerves: PERRL, Extraocular movements intact and full, face symmetric to movement, Tongue midline with normal strength, palat symmetric    Motor: strength 5/5 throughout  No abnormal movements    Coordination: Normal finger-nose-finger in impaired with left upper extremity    DTRs (R/L) Biceps: (2/2)  Brachorad (2/2)  Patellar (2/2)  Ankles (2/2)      Sensation: Intact and symmetric to light touch,     left pronator drift    Romberg: normal.      Data Review:    Recent Results (from the past 24 hour(s))   CBC WITH AUTOMATED DIFF    Collection Time: 07/25/21  3:50 PM   Result Value Ref Range    WBC 5.7 4.6 - 13.2 K/uL    RBC 4.11 (L) 4.35 - 5.65 M/uL    HGB 12.9 (L) 13.0 - 16.0 g/dL    HCT 38.1 36.0 - 48.0 %    MCV 92.7 74.0 - 97.0 FL    MCH 31.4 24.0 - 34.0 PG    MCHC 33.9 31.0 - 37.0 g/dL    RDW 14.1 11.6 - 14.5 %    PLATELET 485 (L) 232 - 420 K/uL    MPV 10.5 9.2 - 11.8 FL    NEUTROPHILS 55 40 - 73 %    LYMPHOCYTES 29 21 - 52 %    MONOCYTES 14 (H) 3 - 10 %    EOSINOPHILS 1 0 - 5 %    BASOPHILS 1 0 - 2 %    ABS. NEUTROPHILS 3.2 1.8 - 8.0 K/UL    ABS. LYMPHOCYTES 1.7 0.9 - 3.6 K/UL    ABS. MONOCYTES 0.8 0.05 - 1.2 K/UL    ABS. EOSINOPHILS 0.1 0.0 - 0.4 K/UL    ABS.  BASOPHILS 0.0 0.0 - 0.1 K/UL    DF AUTOMATED     METABOLIC PANEL, BASIC    Collection Time: 07/25/21  3:50 PM   Result Value Ref Range    Sodium 141 136 - 145 mmol/L    Potassium 4.1 3.5 - 5.5 mmol/L    Chloride 110 100 - 111 mmol/L    CO2 25 21 - 32 mmol/L    Anion gap 6 3.0 - 18 mmol/L    Glucose 87 74 - 99 mg/dL    BUN 28 (H) 7.0 - 18 MG/DL    Creatinine 0.96 0.6 - 1.3 MG/DL    BUN/Creatinine ratio 29 (H) 12 - 20      GFR est AA >60 >60 ml/min/1.73m2    GFR est non-AA >60 >60 ml/min/1.73m2    Calcium 9.3 8.5 - 10.1 MG/DL   PROTHROMBIN TIME + INR    Collection Time: 07/25/21  3:50 PM   Result Value Ref Range    Prothrombin time 13.9 11.5 - 15.2 sec    INR 1.1 0.8 - 1.2     TROPONIN I    Collection Time: 07/25/21  3:50 PM   Result Value Ref Range    Troponin-I, QT <0.02 0.0 - 0.045 NG/ML   EKG, 12 LEAD, INITIAL    Collection Time: 07/25/21  4:10 PM   Result Value Ref Range    Ventricular Rate 69 BPM    Atrial Rate 36 BPM    QRS Duration 116 ms    Q-T Interval 420 ms    QTC Calculation (Bezet) 450 ms    Calculated R Axis 23 degrees Calculated T Axis 114 degrees    Diagnosis       Atrial fibrillation  Cannot rule out Inferior infarct , age undetermined  Abnormal ECG    Confirmed by Mesha Pena MD, Tegan Downing ([id number]) on 7/26/2021 8:17:51 AM     GLUCOSE, POC    Collection Time: 07/25/21  9:07 PM   Result Value Ref Range    Glucose (POC) 104 70 - 110 mg/dL   EKG, 12 LEAD, INITIAL    Collection Time: 07/26/21  1:07 AM   Result Value Ref Range    Ventricular Rate 45 BPM    Atrial Rate 52 BPM    QRS Duration 110 ms    Q-T Interval 476 ms    QTC Calculation (Bezet) 411 ms    Calculated R Axis 1 degrees    Calculated T Axis 162 degrees    Diagnosis       Atrial fibrillation with slow ventricular response with premature ventricular   or aberrantly conducted complexes  Possible Inferior infarct (cited on or before 24-OCT-2013)  Abnormal ECG  When compared with ECG of 25-JUL-2021 16:10,  Vent.  rate has decreased BY  24 BPM  Confirmed by Mesha Pnea MD, Tegan Downing ([id number]) on 7/26/2021 9:59:45 AM     LIPID PANEL    Collection Time: 07/26/21  3:49 AM   Result Value Ref Range    LIPID PROFILE          Cholesterol, total 151 <200 MG/DL    Triglyceride 86 <150 MG/DL    HDL Cholesterol 48 40 - 60 MG/DL    LDL, calculated 85.8 0 - 100 MG/DL    VLDL, calculated 17.2 MG/DL    CHOL/HDL Ratio 3.1 0 - 5.0     HEMOGLOBIN A1C WITH EAG    Collection Time: 07/26/21  3:49 AM   Result Value Ref Range    Hemoglobin A1c 5.9 (H) 4.2 - 5.6 %    Est. average glucose 123 mg/dL   CBC W/O DIFF    Collection Time: 07/26/21  3:49 AM   Result Value Ref Range    WBC 4.3 (L) 4.6 - 13.2 K/uL    RBC 3.86 (L) 4.35 - 5.65 M/uL    HGB 12.2 (L) 13.0 - 16.0 g/dL    HCT 36.1 36.0 - 48.0 %    MCV 93.5 74.0 - 97.0 FL    MCH 31.6 24.0 - 34.0 PG    MCHC 33.8 31.0 - 37.0 g/dL    RDW 13.9 11.6 - 14.5 %    PLATELET 024 (L) 611 - 420 K/uL    MPV 11.1 9.2 - 11.8 FL   HEPATIC FUNCTION PANEL    Collection Time: 07/26/21  3:49 AM   Result Value Ref Range    Protein, total 6.5 6.4 - 8.2 g/dL    Albumin 3.1 (L) 3.4 - 5.0 g/dL Globulin 3.4 2.0 - 4.0 g/dL    A-G Ratio 0.9 0.8 - 1.7      Bilirubin, total 0.8 0.2 - 1.0 MG/DL    Bilirubin, direct 0.2 0.0 - 0.2 MG/DL    Alk. phosphatase 113 45 - 117 U/L    AST (SGOT) 17 10 - 38 U/L    ALT (SGPT) 29 16 - 61 U/L   MAGNESIUM    Collection Time: 07/26/21  3:49 AM   Result Value Ref Range    Magnesium 2.1 1.6 - 2.6 mg/dL   METABOLIC PANEL, BASIC    Collection Time: 07/26/21  3:49 AM   Result Value Ref Range    Sodium 141 136 - 145 mmol/L    Potassium 3.9 3.5 - 5.5 mmol/L    Chloride 112 (H) 100 - 111 mmol/L    CO2 26 21 - 32 mmol/L    Anion gap 3 3.0 - 18 mmol/L    Glucose 91 74 - 99 mg/dL    BUN 22 (H) 7.0 - 18 MG/DL    Creatinine 0.67 0.6 - 1.3 MG/DL    BUN/Creatinine ratio 33 (H) 12 - 20      GFR est AA >60 >60 ml/min/1.73m2    GFR est non-AA >60 >60 ml/min/1.73m2    Calcium 9.5 8.5 - 10.1 MG/DL   GLUCOSE, POC    Collection Time: 07/26/21  6:13 AM   Result Value Ref Range    Glucose (POC) 87 70 - 110 mg/dL   GLUCOSE, POC    Collection Time: 07/26/21 10:40 AM   Result Value Ref Range    Glucose (POC) 129 (H) 70 - 110 mg/dL         Radiology studies: MRI brain reviewed, small acute infarct right convexity      Assessment: This is an 81 y/o WM with known PAF and a prior large vessel stroke who presented with transient left arm weakness. His MRI does confirm a small stroke consistent with a cardiac source. I suspect his prior stroke was also related to atrial fibrillation      Active Problems:    CVA (cerebral vascular accident) ([address] (7/25/2021)      Paroxysmal atrial fibrillation ([address] (7/25/2021)      Facial droop (7/25/2021)      Left arm weakness (7/25/2021)        Plan:     1. Recommend starting on [facility] ASAP, stroke is very small and has low risk of secondary hemorrhage. 2. OK for d/c from neurologic standpoint        Amado Goodwin M.D.   Clinical Neurophysiology  Neuromuscular specialist

## 2021-07-26 NOTE — PROGRESS NOTES
RN called stating that patient had asymptomatic bradycardia. I went and saw the patient. Patient is sitting in bed in no apparent distress. Patient denies any chest pain or shortness of breath or lightheadedness. Patient states that his heart rate is normally low and that he sees his cardiologist Dr. Roxane Nash.     Visit Vitals  /69 (BP 1 Location: Right arm, BP Patient Position: At rest)   Pulse (!) 50   Temp 97.9 °F (36.6 °C)   Resp 19   Wt 80.7 kg (178 lb)   SpO2 95%   BMI 25.54 kg/m²     General:  Awake, follows commands, responds appropriately  Cardiovascular:  S1S2+, bradycardic  Pulmonary:  CTA b/l  GI:  Soft, BS+, NT, ND  Extremities:  No edema    EKG shows atrial fibrillation with a slow heart rate of 45    Continue to monitor on telemetry, cardiology consult  Discussed with patient, discussed with DAVID Villalpando MD

## 2021-07-27 VITALS
SYSTOLIC BLOOD PRESSURE: 146 MMHG | WEIGHT: 178 LBS | DIASTOLIC BLOOD PRESSURE: 90 MMHG | RESPIRATION RATE: 18 BRPM | OXYGEN SATURATION: 98 % | HEART RATE: 73 BPM | HEIGHT: 70 IN | TEMPERATURE: 97.7 F | BODY MASS INDEX: 25.48 KG/M2

## 2021-07-27 LAB
ANION GAP SERPL CALC-SCNC: 6 MMOL/L (ref 3–18)
BUN SERPL-MCNC: 21 MG/DL (ref 7–18)
BUN/CREAT SERPL: 29 (ref 12–20)
CALCIUM SERPL-MCNC: 9 MG/DL (ref 8.5–10.1)
CHLORIDE SERPL-SCNC: 112 MMOL/L (ref 100–111)
CO2 SERPL-SCNC: 26 MMOL/L (ref 21–32)
CREAT SERPL-MCNC: 0.73 MG/DL (ref 0.6–1.3)
ERYTHROCYTE [DISTWIDTH] IN BLOOD BY AUTOMATED COUNT: 13.7 % (ref 11.6–14.5)
GLUCOSE BLD STRIP.AUTO-MCNC: 86 MG/DL (ref 70–110)
GLUCOSE BLD STRIP.AUTO-MCNC: 97 MG/DL (ref 70–110)
GLUCOSE SERPL-MCNC: 87 MG/DL (ref 74–99)
HCT VFR BLD AUTO: 36.9 % (ref 36–48)
HGB BLD-MCNC: 12.2 G/DL (ref 13–16)
MCH RBC QN AUTO: 30.9 PG (ref 24–34)
MCHC RBC AUTO-ENTMCNC: 33.1 G/DL (ref 31–37)
MCV RBC AUTO: 93.4 FL (ref 74–97)
PLATELET # BLD AUTO: 132 K/UL (ref 135–420)
PMV BLD AUTO: 11.2 FL (ref 9.2–11.8)
POTASSIUM SERPL-SCNC: 4.2 MMOL/L (ref 3.5–5.5)
RBC # BLD AUTO: 3.95 M/UL (ref 4.35–5.65)
SODIUM SERPL-SCNC: 144 MMOL/L (ref 136–145)
WBC # BLD AUTO: 4.1 K/UL (ref 4.6–13.2)

## 2021-07-27 PROCEDURE — APPSS45 APP SPLIT SHARED TIME 31-45 MINUTES: Performed by: PHYSICIAN ASSISTANT

## 2021-07-27 PROCEDURE — 36415 COLL VENOUS BLD VENIPUNCTURE: CPT

## 2021-07-27 PROCEDURE — 74011250637 HC RX REV CODE- 250/637: Performed by: INTERNAL MEDICINE

## 2021-07-27 PROCEDURE — 82962 GLUCOSE BLOOD TEST: CPT

## 2021-07-27 PROCEDURE — 99238 HOSP IP/OBS DSCHRG MGMT 30/<: CPT | Performed by: INTERNAL MEDICINE

## 2021-07-27 PROCEDURE — 74011250637 HC RX REV CODE- 250/637: Performed by: PHYSICIAN ASSISTANT

## 2021-07-27 PROCEDURE — 92526 ORAL FUNCTION THERAPY: CPT

## 2021-07-27 PROCEDURE — 92507 TX SP LANG VOICE COMM INDIV: CPT

## 2021-07-27 PROCEDURE — 80048 BASIC METABOLIC PNL TOTAL CA: CPT

## 2021-07-27 PROCEDURE — 85027 COMPLETE CBC AUTOMATED: CPT

## 2021-07-27 PROCEDURE — 99232 SBSQ HOSP IP/OBS MODERATE 35: CPT | Performed by: INTERNAL MEDICINE

## 2021-07-27 RX ORDER — FUROSEMIDE 20 MG/1
TABLET ORAL
Qty: 30 TABLET | Refills: 0 | Status: SHIPPED | OUTPATIENT
Start: 2021-07-27 | End: 2021-08-19 | Stop reason: SDUPTHER

## 2021-07-27 RX ORDER — FAMOTIDINE 20 MG/1
20 TABLET, FILM COATED ORAL DAILY
Qty: 30 TABLET | Refills: 0 | Status: SHIPPED | OUTPATIENT
Start: 2021-07-27 | End: 2021-08-19 | Stop reason: SDUPTHER

## 2021-07-27 RX ORDER — ROSUVASTATIN CALCIUM 40 MG/1
40 TABLET, COATED ORAL DAILY
Qty: 30 TABLET | Refills: 0 | Status: SHIPPED | OUTPATIENT
Start: 2021-07-27 | End: 2021-10-13 | Stop reason: SDUPTHER

## 2021-07-27 RX ORDER — FAMOTIDINE 20 MG/1
20 TABLET, FILM COATED ORAL DAILY
Status: DISCONTINUED | OUTPATIENT
Start: 2021-07-27 | End: 2021-07-27 | Stop reason: HOSPADM

## 2021-07-27 RX ADMIN — FAMOTIDINE 20 MG: 20 TABLET ORAL at 08:36

## 2021-07-27 RX ADMIN — ASPIRIN 325 MG ORAL TABLET 325 MG: 325 PILL ORAL at 08:37

## 2021-07-27 RX ADMIN — APIXABAN 5 MG: 5 TABLET, FILM COATED ORAL at 08:36

## 2021-07-27 RX ADMIN — DOCUSATE SODIUM 50MG AND SENNOSIDES 8.6MG 1 TABLET: 8.6; 5 TABLET, FILM COATED ORAL at 08:37

## 2021-07-27 RX ADMIN — FUROSEMIDE 20 MG: 20 TABLET ORAL at 08:37

## 2021-07-27 NOTE — PROGRESS NOTES
D/C orders received. No needs identified by . Chart reviewed. Pt will be transported home by Son.  available as needed.     Eleni Duran, RN BSN  Care Manager  [phone]

## 2021-07-27 NOTE — PROGRESS NOTES
Reason for Admission:  CVA (cerebral vascular accident) ([facility] [I63.9]  Left arm weakness [R29.898]  Facial droop [R29.810]  Paroxysmal atrial fibrillation ([address] [I48.0]                 RUR Score:    11            Plan for utilizing home health:    no                      Likelihood of Readmission:   LOW                         Transition of Care Plan:              Initial assessment completed with patient. Cognitive status of patient: oriented to time, place, person and situation. Face sheet information confirmed:  yes. The patient designates daughter to participate in his discharge plan and to receive any needed information. This patient lives in a single family home with patient and spouse. Patient is able to navigate steps as needed. Prior to hospitalization, patient was considered to be independent with ADLs/IADLS : yes . Patient has a current ACP document on file: yes      Healthcare Decision Maker:   Primary Decision Maker: Ese Lr - [phone]    Click here to complete [address] including selection of the Healthcare Decision Maker Relationship (ie \"Primary\")    The patient and daughter will be available to transport patient home upon discharge. The patient already has none reported,  medical equipment available in the home. Patient is not currently active with home health. Patient has not stayed in a skilled nursing facility or rehab. This patient is on dialysis :no    Currently, the discharge plan is Home. The patient states that he can obtain his medications from the pharmacy, and take his medications as directed. Patient's current insurance is Medicare and        Care Management Interventions  PCP Verified by CM:  Yes  Mode of Transport at Discharge: Self  Current Support Network: Lives with Spouse  Confirm Follow Up Transport: Family  The Plan for Transition of Care is Related to the Following Treatment Goals : Home  Discharge Location  Discharge Placement: [address], RN BSN  Care Manager  [phone]

## 2021-07-27 NOTE — PROGRESS NOTES
Problem: Dysphagia (Adult)  Goal: *Acute Goals and Plan of Care (Insert Text)  Description:   Patient will:  1. Tolerate PO trials with 0 s/s overt distress in 4/5 trials - met  2. Utilize compensatory swallow strategies/maneuvers (decrease bite/sip, size/rate, alt. liq/sol) with min cues in 4/5 trials - met    Rec:     Regular solid with thin liquids  Aspiration precautions  HOB >45 during po intake, remain >30 for 30-45 minutes after po   Small bites/sips; alternate liquid/solid with slow feeding rate   Oral care TID  Meds per pt preference  Outcome: Resolved/Met     Problem: Motor Speech Impaired (Adult)  Goal: *Acute Goals and Plan of Care (Insert Text)  Description:   Motor Speech Goals:   1. Perform oral motor exercises (with and without resistance) in therapy and at home to increase oral motor strength/range-of-motion for articulation tasks with mod A with visual/verbal cues in 4/5 trials. - met  Outcome: Resolved/Met    SPEECH-LANGUAGE TREATMENT AND DISCHARGE  SWALLOW TREATMENT AND DISCHARGE    Patient: Gus Milan (10 y.o. male)  Date: 7/27/2021  Primary Diagnosis: CVA (cerebral vascular accident) [facility]) [I63.9]  Left arm weakness [R29.898]  Facial droop [R29.810]  Paroxysmal atrial fibrillation ([address] [I48.0]        Precautions: aspiration   (standard)    Speech-Language Tx:  Pt was seen at bedside for follow up motor speech management. Left orofacial droop mostly resolved this date. Pt able to independently identify and demo ability to complete oral motor exercises. He stated, \"My daughter is a speech therapist so she drilled these into me last night!\" Speech was fluent; no dysarthria observed. Expressive/receptive language function appeared to be [facility]. No further skilled SLP services are indicated at this time as pt is independent with exercises and appears to be nearly at baseline. Swallow Tx:   Pt was also seen at bedside for follow up dysphagia management.  Pt tolerated regular solids and thin liquids without any overt s/sx of aspiration. Laryngeal elevation was functional/timely to palpation. Mastication was functional with timely a-p transit. Recommend to continue regular solids with thin liquids, aspiration precautions, oral care TID, and meds as tolerated. No further skilled SLP services are indicated at this time. Please re-consult if needed. PLAN:  Recommendations and Planned Interventions: See above  Discharge Recommendations:  None     SUBJECTIVE:   Patient stated Thanks for stopping back to make sure I'm doing alright! I'm feeling great! Ready to go home! . 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     Dr Ming Prajapati GRAFT  09/2010    LIMA - LAD/D1,  SVG - OM,  SVG - PDA    [facility]PAIR  [facility]     Home Situation:   Home Situation  Home Environment: Private residence  One/Two Story Residence: One story  Living Alone: No (currently living with daughter)  Support Systems: Child(mamie), Spouse/Significant Other/Partner  Patient Expects to be Discharged to[de-identified] [employer]  Current DME Used/Available at Home: None  Tub or Shower Type: Tub/Shower combination    Cognitive and Communication Status:  Neurologic State: Alert  Orientation Level: Oriented X4  Cognition: Appropriate decision making  Perception: Appears intact  Perseveration: No perseveration noted  Safety/Judgement: Awareness of environment    Dysphagia Treatment:  Oral Assessment:  Oral Assessment  Labial: Left droop  Dentition: Natural, Intact  Oral Hygiene: adequate  Lingual: No impairment  Velum: No impairment  Mandible: No impairment  P.O. Trials:   Patient Position: 90 in chair at bedside   Vocal quality prior to P.O.: No impairment   Consistency Presented: Thin liquid, Solid, Puree   How Presented: Self-fed/presented, Cup/sip, Spoon, Straw, Successive swallows   Bolus Acceptance: No impairment   Bolus Formation/Control: No impairment   Propulsion: No impairment   Oral Residue: None   Initiation of Swallow: No impairment   Laryngeal Elevation: Functional   Aspiration Signs/Symptoms: None   Pharyngeal Phase Characteristics: No impairment, issues, or problems    Effective Modifications: None   Cues for Modifications: None   Oral Phase Severity: No impairment   Pharyngeal Phase Severity : No impairment     Oral Motor Exercises: see above    PAIN:  Pain level pre-treatment: 0/10   Pain level post-treatment: 0/10     After treatment:   []            Patient left in no apparent distress sitting up in chair  [x]            Patient left in no apparent distress in bed  [x]            Call bell left within reach  [x]            Nursing notified  []            Family present  []            Caregiver present  []            Bed alarm activated    COMMUNICATION/EDUCATION:   [x]            Aspiration precautions; swallow safety; compensatory techniques. [x]            Motor speech communication strategies  [x]            Patient/family have participated as able in goal setting and plan of care. [x]            Patient/family agree to work toward stated goals and plan of care. []            Patient understands intent and goals of therapy; neutral about participation. []            Patient unable to participate in goal setting/plan of care; educ ongoing with interdisciplinary staff  [x]         Posted safety precautions in patient's room.     Thank you for this referral.    Sloan Christianson M.S. CCC-SLP/L  Speech-Language Pathologist

## 2021-07-27 NOTE — PROGRESS NOTES
ARU/IPR REFERRAL CONTACT NOTE  [address] [place] [address] for Physical Rehabilitation    RE: Lovely Hammond     Thank you for the opportunity to review this patient's case for admission to [address] [place] [address] for Physical Rehabilitation. Based on our pre-admission screening:     [x ] This patient does not meet criteria for admission to [facility] for Physical        Rehabilitation due to:    [x ] Too functional, per documentation, patient does not require both Physical and Occupational Therapy Services at an acute rehabilitation level of intensity. Again, Thank you for this referral. Should you have any questions please do not hesitate to call. Sincerely,  Lyndsay Hannah. Gisel Gerardo, [address]  Gisel Gerardo RN  Admissions [facility] for Physical Rehabilitation  [phone]

## 2021-07-27 NOTE — DISCHARGE INSTRUCTIONS
Patient armband removed and shredded  MyChart Activation    Thank you for requesting access to [facility]. Please follow the instructions below to securely access and download your online medical record. [facility]IC allows you to send messages to your doctor, view your test results, renew your prescriptions, schedule appointments, and more. How Do I Sign Up? 1. In your internet browser, go to [email or web address]  2. Click on the First Time User? Click Here link in the Sign In box. You will be redirect to the New Member Sign Up page. 3. Enter your [facility] Access Code exactly as it appears below. You will not need to use this code after youve completed the sign-up process. If you do not sign up before the expiration date, you must request a new code. [facility]IC Access Code: [email or web address]  Expires: 2021  3:51 PM (This is the date your [facility]UEL[facility]IC access code will )    4. Enter the last four digits of your Social Security Number (xxxx) and Date of Birth (mm/dd/yyyy) as indicated and click Submit. You will be taken to the next sign-up page. 5. Create a [facility]UELOGIC ID. This will be your [facility] login ID and cannot be changed, so think of one that is secure and easy to remember. 6. Create a [facility]IC password. You can change your password at any time. 7. Enter your Password Reset Question and Answer. This can be used at a later time if you forget your password. 8. Enter your e-mail address. You will receive e-mail notification when new information is available in [address]. 9. Click Sign Up. You can now view and download portions of your medical record. 10. Click the Download Summary menu link to download a portable copy of your medical information. Additional Information    If you have questions, please visit the Frequently Asked Questions section of the [facility] website at [email or web address] Remember, [facility] is NOT to be used for urgent needs.  For medical emergencies, dial 911.        DISCHARGE SUMMARY from Nurse    PATIENT INSTRUCTIONS:    IReport the following to your surgeon:  · Excessive pain, swelling, redness or odor of or around the surgical area  · Temperature over 100.5  · Nausea and vomiting lasting longer than 4 hours or if unable to take medications  · Any signs of decreased circulation or nerve impairment to extremity: change in color, persistent  numbness, tingling, coldness or increase pain  · Any questions    What to do at Home:  Recommended activity: Activity as tolerated,     If you experience any of the following symptoms chest pain, shortness of breath, fever, chills, elevated temperature greater than 100.9 F, swelling, purulent drainage from incisional site, excessive nausea or vomiting, any bleeding from nose, rectal, or penile please,  follow up with nearest Emergency room. *  Please give a list of your current medications to your Primary Care Provider. *  Please update this list whenever your medications are discontinued, doses are      changed, or new medications (including over-the-counter products) are added. *  Please carry medication information at all times in case of emergency situations. These are general instructions for a healthy lifestyle:    No smoking/ No tobacco products/ Avoid exposure to second hand smoke  Surgeon General's Warning:  Quitting smoking now greatly reduces serious risk to your health. Obesity, smoking, and sedentary lifestyle greatly increases your risk for illness    A healthy diet, regular physical exercise & weight monitoring are important for maintaining a healthy lifestyle    You may be retaining fluid if you have a history of heart failure or if you experience any of the following symptoms:  Weight gain of 3 pounds or more overnight or 5 pounds in a week, increased swelling in our hands or feet or shortness of breath while lying flat in bed.   Please call your doctor as soon as you notice any of these symptoms; do not wait until your next office visit. The discharge information has been reviewed with the patient. The patient verbalized understanding. Discharge medications reviewed with the patient and appropriate educational materials and side effects teaching were provided.   ___________________________________________________________________________________________________________________________________

## 2021-07-27 NOTE — PROGRESS NOTES
Cardiology Progress Note      Attending Cardiologist: Dr. Sedrick Nieves     Assessment:     -TIA vs. CVA, presented due to LUE weakness and L facial droop. CT head 7/25/2021 with large encephalomalacia R parietal lobe c/w old previously hemorrhagic stroke described on studies from 2018.  -Afib with slow ventricular response, asymptomatic, known Hx paroxysmal atrial fibrillation, historically not on oral anticoagulation due to Hx cryptogenic ICH. Not a candidate for cardioversion (chemical or electrical) because he cannot be anticoagulated following cardioversion. Neuro clearance for anticoagulation this admission. Now on Eliquis 5 mg BID. -CAD, s/p CABG 09/2010 (LIMA-LAD/D1, SVG-OM, SVG-PDA). On ASA 81 mg and Crestor 20 mg as outpatient. He is not on BB due to Hx bradycardia.  -Cardiomyopathy, Echo 09/2019 with EF 35-40%, historically has not required loop diuretics. -HFpEF. ECHO this admission EF 3540%. Grade 2 LV DD.   -Hyperlipidemia  -Peptic ulcer disease  -Recent open L inguinal hernia repair with plug and patch mesh by Dr. Manuel Ang 7/23/2021. Primary cardiologist is Dr. Jeanette Estrada:     -Continue ASA, Crestor.  -Eliquis 5 mg BID started yesterday per neuro recommendations.    -Continued on ASA and statin   -Recommend GDMT for cardiomyopathy. Patient declining further cardiac medications at this time. He would like to defer further treatment to his primary cardiologist Dr. Marjan Pedro. He has a scheduled appt post discharge.   -Dispo planning per primary team     Hemodynamically stable. Eliquis started by neurology. Will discontinue aspirin as his CAD has been stable for many years. Continue statins. Consider ARBs or Entresto and SGLT2 inhibitors as outpatient. Patient wants to start any new medications only with Dr. Arron Opitz for discharge from cardiac standpoint and follow-up with Dr. Marjan Pedro as outpatient. Subjective:     Denies CP or SOB. Wants to go home. Medications:      Allergies   Allergen Reactions    Atorvastatin Myalgia and Other (comments)     Myalgia    Pcn [Penicillins] Other (comments)     Diaphoretic, elevates blood pressure, insomnia    Pravastatin Myalgia and Other (comments)     Myalgia    Amoxicillin Other (comments)     Felt shaky and nervous    Rosuvastatin Other (comments)     Can only take name brand CRESTOR        Current Facility-Administered Medications   Medication Dose Route Frequency    famotidine (PEPCID) tablet 20 mg  20 mg Oral DAILY    apixaban (ELIQUIS) tablet 5 mg  5 mg Oral BID    furosemide (LASIX) tablet 20 mg  20 mg Oral DAILY    ondansetron (ZOFRAN) injection 4 mg  4 mg IntraVENous Q6H PRN    aspirin tablet 325 mg  325 mg Oral DAILY    acetaminophen (TYLENOL) tablet 650 mg  650 mg Oral Q4H PRN    bisacodyL (DULCOLAX) tablet 5 mg  5 mg Oral DAILY PRN    senna-docusate (PERICOLACE) 8.6-50 mg per tablet 1 Tablet  1 Tablet Oral DAILY    CRESTOR tablet 40 mg (BRAND NAME ONLY)  40 mg Oral DAILY         Physical Exam:     Visit Vitals  BP (!) 146/90   Pulse 73   Temp 97.7 °F (36.5 °C)   Resp 18   Ht 5' 10\" (1.778 m)   Wt 80.7 kg (178 lb)   SpO2 98%   BMI 25.54 kg/m²       TELE: AFIB    BP Readings from Last 3 Encounters:   07/27/21 (!) 146/90   12/28/20 132/72   06/23/20 124/66     Pulse Readings from Last 3 Encounters:   07/27/21 73   12/28/20 (!) 56   06/23/20 72     Wt Readings from Last 3 Encounters:   07/26/21 80.7 kg (178 lb)   12/28/20 80.3 kg (177 lb)   06/23/20 76.5 kg (168 lb 9.6 oz)       General:  alert, cooperative, no distress, appears stated age  Neck:  no JVD  Lungs:  clear to auscultation bilaterally  Heart:  irregularly irregular rhythm  Abdomen:  abdomen is soft without significant tenderness, masses, organomegaly or guarding  Extremities:  extremities normal, atraumatic, no cyanosis or edema     Data Review:     Recent Labs     07/27/21  0150 07/26/21  0349 07/25/21  1550   WBC 4.1* 4.3* 5.7   HGB 12.2* 12.2* 12.9*   HCT 36.9 36.1 38.1   PLT 132* 117* 116*     Recent Labs     07/27/21  0150 07/26/21  0349 07/25/21  1550    141 141   K 4.2 3.9 4.1   * 112* 110   CO2 26 26 25   GLU 87 91 87   BUN 21* 22* 28*   CREA 0.73 0.67 0.96   CA 9.0 9.5 9.3   MG  --  2.1  --    ALB  --  3.1*  --    ALT  --  29  --    INR  --   --  1.1       Results for orders placed or performed during the hospital encounter of 07/25/21   EKG, 12 LEAD, INITIAL   Result Value Ref Range    Ventricular Rate 45 BPM    Atrial Rate 52 BPM    QRS Duration 110 ms    Q-T Interval 476 ms    QTC Calculation (Bezet) 411 ms    Calculated R Axis 1 degrees    Calculated T Axis 162 degrees    Diagnosis       Atrial fibrillation with slow ventricular response with premature ventricular   or aberrantly conducted complexes  Possible Inferior infarct (cited on or before 24-OCT-2013)  Abnormal ECG  When compared with ECG of 25-JUL-2021 16:10,  Vent.  rate has decreased BY  24 BPM  Confirmed by Krystal Juares MD, Cesar Preston ([id number]) on 7/26/2021 9:59:45 AM         All Cardiac Markers in the last 24 hours:    No results found for: CPK, CK, CKMMB, CKMB, RCK3, CKMBT, CKNDX, CKND1, CHLOE, TROPT, TROIQ, YAJAIRA, TROPT, TNIPOC, BNP, BNPP    Last Lipid:    Lab Results   Component Value Date/Time    Cholesterol, total 151 07/26/2021 03:49 AM    HDL Cholesterol 48 07/26/2021 03:49 AM    LDL, calculated 85.8 07/26/2021 03:49 AM    Triglyceride 86 07/26/2021 03:49 AM    CHOL/HDL Ratio 3.1 07/26/2021 03:49 AM       Cardiographics:     EKG Results     Procedure 720 Value Units Date/Time    EKG, 12 LEAD, INITIAL [[id number]] Collected: 07/26/21 0107    Order Status: Completed Updated: 07/26/21 1000     Ventricular Rate 45 BPM      Atrial Rate 52 BPM      QRS Duration 110 ms      Q-T Interval 476 ms      QTC Calculation (Bezet) 411 ms      Calculated R Axis 1 degrees      Calculated T Axis 162 degrees      Diagnosis --     Atrial fibrillation with slow ventricular response with premature ventricular   or aberrantly conducted complexes  Possible Inferior infarct (cited on or before 24-OCT-2013)  Abnormal ECG  When compared with ECG of 25-JUL-2021 16:10,  Vent. rate has decreased BY  24 BPM  Confirmed by Lefty Cruz MD, Radha Shearer ([id number]) on 7/26/2021 9:59:45 AM      Initial ECG [[id number]] Collected: 07/25/21 1610    Order Status: Completed Updated: 07/26/21 0818     Ventricular Rate 69 BPM      Atrial Rate 36 BPM      QRS Duration 116 ms      Q-T Interval 420 ms      QTC Calculation (Bezet) 450 ms      Calculated R Axis 23 degrees      Calculated T Axis 114 degrees      Diagnosis --     Atrial fibrillation  Cannot rule out Inferior infarct , age undetermined  Abnormal ECG    Confirmed by Lefty Cruz MD, Radha Shearer ([id number]) on 7/26/2021 8:17:51 AM              08/08/11    NM CARDIAC SPECT W STRESS / REST MULT  8/8/2011          XR Results (most recent):  Results from [place] encounter on 01/02/14    XR KNEE RT 3 V    Narrative  Knee, Complete right. CPT CODE: 35995    HISTORY: Difficulty walking. TECHNIQUE: Three views of the right knee. COMPARISON: None. FINDINGS:    There are no fractures. Joint relationships are normal and bony structures  normally mineralized. The medial femoral tibial compartment and patellofemoral  compartments demonstrate moderate narrowing with osteophyte formation. No joint  effusion is seen. No significant soft tissue change. Impression  :    Moderate changes of osteoarthritis. Signed By: Miroslava Tyler PA-C     July 27, 2021      I have personally and independently evaluated and examined the patient. All relevant labs and testing data are reviewed. I have personally reviewed all the diagnostic labs, available EKG imaging x-rays and other diagnostic data and incorporated them into my care. I am referencing APC's note. I participated in medical decision making. Care plan discussed and updated after review.   Serena Sinha MD

## 2021-07-27 NOTE — DISCHARGE SUMMARY
Discharge Summary    Patient: Devin Foss               Sex: male          DOA: 7/25/2021         YOB: 1935      Age:  82 y.o.        LOS:  LOS: 2 days                Admit Date: 7/25/2021    Discharge Date: 7/27/2021    Admission Diagnoses: CVA (cerebral vascular accident) [facility]) [I63.9]  Left arm weakness [R29.898]  Facial droop [R29.810]  Paroxysmal atrial fibrillation ([facility] [address] [I48.0]    Discharge Diagnoses:    Problem List as of 7/27/2021 Date Reviewed: 12/28/2020        Codes Class Noted - Resolved    RESOLVED: Nontraumatic hemorrhage of right cerebral hemisphere [facility]) ICD-10-CM: I61.2  ICD-9-CM: 672 Acute 5/8/2018 - 12/24/2020        CVA (cerebral vascular accident) [facility]) ICD-10-CM: I63.9  ICD-9-CM: 434.91  7/25/2021 - Present        Paroxysmal atrial fibrillation ([facility] [address] ICD-10-CM: I48.0  ICD-9-CM: 427.31  7/25/2021 - Present        Facial droop ICD-10-CM: R29.810  ICD-9-CM: 781.94  7/25/2021 - Present        Left arm weakness ICD-10-CM: R29.898  ICD-9-CM: 729.89  7/25/2021 - Present        S/P CABG x 4 ICD-10-CM: Z95.1  ICD-9-CM: V45.81  12/24/2020 - Present        Atrial fibrillation ([facility] [address] ICD-10-CM: I48.91  ICD-9-CM: 427.31  12/24/2020 - Present    Overview Signed 12/24/2020  6:20 AM by Moreen Bamberger, MD     [facility] due to prior ICH             History of intracranial hemorrhage ICD-10-CM: Z86.79  ICD-9-CM: V12.59  6/23/2020 - Present        Advance directive discussed with patient ICD-10-CM: Z71.89  ICD-9-CM: V65.49  3/8/2017 - Present        Hyperlipidemia ICD-10-CM: E78.5  ICD-9-CM: 272.4  3/8/2017 - Present        Coronary artery disease ICD-10-CM: I25.10  ICD-9-CM: 414.01  Unknown - Present        GERD (gastroesophageal reflux disease) ICD-10-CM: K21.9  ICD-9-CM: 530.81  Unknown - Present              Discharge Condition:  Improved    Hospital Course:Rose Mary Coley is a 82 y.o.  male w/ PMH of CAD, who presented to the emergency room via EMS on 7/25 for further evaluation and management of left upper extremity weakness associated with left facial droop.  Patient was sitting on couch and all of a sudden he started losing his . Recent left inguinal hernia repair done by Dr. Chang Likens on this Friday and did not take aspirin on Friday asa mg aspirin prior to calling EMS.  Patient also mentioned that he has history of paroxysmal atrial fibrillation and under care of Dr. Jose David Diana but has not been advised to take any anticoagulation due to increased risk of bleeding.     Symptoms doing better, MRI on 7/26 with \"Punctate focus of acute ischemia in the right parietal centrum semiovale subcortical white matter. \" Neurology recommended d/c with [address] [place] [address], Eliquis started. PT/OT signed off as patient is doing well. Stable for discharge. Evaluated today and patient is alert, NAD. No Complaints. He is ready for d/c home, daughter is able to pick him up.        Consults:    Treatment Team: Attending Provider: Delilah Leal MD; Consulting Provider: BRITT Herrera; Utilization Review: Sandie Magaña RN    Significant Diagnostic Studies:   7/26/21  Findings:      Parenchyma: Punctate focus of right parietal subcortical white matter acute  ischemia in the centrum semiovale. No additional acute ischemia. Chronic large  right parieto-occipital cortical infarct with encephalomalacia and evidence of  prior hemorrhage. No acute hemorrhage. No mass lesion. Punctate susceptibility artifact in the deep periventricular white matter right  parietal lobe and left occipital lobe, nonspecific and probably sequelae of  prior hemorrhage.     CSF spaces: Ventricles and cisterns remain midline in position.  Chronic ex vacuo  dilatation of the posterior right lateral ventricle and right occipital horn     IAC regions: Unremarkable     Parasellar region: Unremarkable     Vasculature: Appropriate flow voids within the major skull base vasculature.     Cervicomedullary junction: Patent     Orbits: Unremarkable     Paranasal sinuses and mastoid air cells: Mild mucosal thickening in the ethmoid  sinus. No fluid in the sinuses. Otherwise clear sinuses and mastoid air cells.      Calvarium and upper cervical spine: Unremarkable     IMPRESSION     1.  Punctate focus of acute ischemia in the right parietal centrum semiovale  subcortical white matter. 2. Large chronic right parieto-occipital cortical infarction. Discharge Medications:     Current Discharge Medication List      START taking these medications    Details   famotidine (PEPCID) 20 mg tablet Take 1 Tablet by mouth daily. Qty: 30 Tablet, Refills: 0  Start date: 7/27/2021      apixaban (ELIQUIS) 5 mg tablet Take 1 Tablet by mouth two (2) times a day. Indications: treatment to prevent blood clots in chronic atrial fibrillation  Qty: 60 Tablet, Refills: 0  Start date: 7/27/2021         CONTINUE these medications which have CHANGED    Details   furosemide (LASIX) 20 mg tablet Take one pill by mouth every morning. Qty: 30 Tablet, Refills: 0  Start date: 7/27/2021      rosuvastatin (CRESTOR) 40 mg tablet Take 1 Tablet by mouth daily. Qty: 30 Tablet, Refills: 0  Start date: 7/27/2021         CONTINUE these medications which have NOT CHANGED    Details   CHOLECALCIFEROL, VITAMIN D3, PO Take 5,000 Units by mouth. VITAMIN B COMPLEX PO Take  by mouth. MULTIVITAMIN PO Take  by mouth daily. ondansetron (ZOFRAN ODT) 4 mg disintegrating tablet Take 4 mg by mouth.          STOP taking these medications       aspirin 81 mg tablet Comments:   Reason for Stopping:                 Activity: Activity as tolerated    Diet: Regular Diet    Wound Care: None needed    Follow-up: w/ PCP within 2 weeks        [place] Anderson PA-C  07/27/21

## 2021-07-27 NOTE — PROGRESS NOTES
Discharge instructions given verbally and written all questions answered IV, tele, and armband discontinued prescriptions called into pharmacy for patient to  family member at bedside to transport home via car. As part of the discharge instructions, medications already given today were discussed with the patient. The next dose due of all ordered meds was highlighted as part of the medication discharge instructions. Discussed with the patient the importance of taking medications as directed, as well as the side effects and adverse reactions to medications ordered.

## 2021-07-27 NOTE — PROGRESS NOTES
Physician Progress Note      Veronica Sweet  CSN #:                  [id number]  :                       1935  ADMIT DATE:       2021 3:50 PM  DISCH DATE:  RESPONDING  PROVIDER #:        SUSAN Roth MD          QUERY TEXT:    Pt admitted with CVA and has CHF documented by cardiology after echocardiogram. If possible, please document in progress notes and discharge summary further specificity regarding the type and acuity of CHF:    The medical record reflects the following:  Risk Factors: 82 y.o. male with afib, CAD, Cardiomyopathy, HLD  Clinical Indicators: ECHO this admission EF 35-40%. Grade 2 LV DD. Per Cardiology  - HRpEF  Treatment: Cardiology following; . Patient declining further cardiac medications at this time. He would like to defer further treatment to his primary cardiologist Dr. Diana Emery. Thank you,  Elvi Barba RN, [facility]  [phone]  Options provided:  -- Chronic Systolic CHF/HFrEF  -- Chronic Diastolic CHF/HFpEF  -- Chronic Systolic and Diastolic CHF  -- Other - I will add my own diagnosis  -- Disagree - Not applicable / Not valid  -- Disagree - Clinically unable to determine / Unknown  -- Refer to Clinical Documentation Reviewer    PROVIDER RESPONSE TEXT:    This patient has chronic systolic CHF/HFrEF.     Query created by: Mirna Foote on 2021 11:08 AM      Electronically signed by:  [facility] MD Mook Roth MD 2021 11:27 AM

## 2021-07-27 NOTE — ROUTINE PROCESS
Bedside shift change report given to Anupam Parada RN (oncoming nurse) by Jessica Michel RN (offgoing nurse). Report included the following information SBAR, Kardex, MAR and Cardiac Rhythm Afib.

## 2021-07-27 NOTE — PROGRESS NOTES
conducted an initial consultation and Spiritual Assessment for Gus Milan, who is a 82 y. o.,male. Patient's Primary Language is: [place]. According to the patient's EMR Latter-day Affiliation is: Other. The reason the Patient came to the hospital is:   Patient Active Problem List    Diagnosis Date Noted    CVA (cerebral vascular accident) ([facility] [address] 07/25/2021    Paroxysmal atrial fibrillation ([facility] [address] 07/25/2021    Facial droop 07/25/2021    Left arm weakness 07/25/2021    S/P CABG x 4 12/24/2020    Atrial fibrillation ([facility] [address] 12/24/2020    History of intracranial hemorrhage 06/23/2020    Advance directive discussed with patient 03/08/2017    Hyperlipidemia 03/08/2017    Coronary artery disease     GERD (gastroesophageal reflux disease)         The  provided the following Interventions:   was unable to assess during nurse visit. Plan:  Chaplains will continue to follow and will provide pastoral care on an as needed/requested basis.  recommends bedside caregivers page  on duty if patient shows signs of acute spiritual or emotional distress.     [address] [facility]   [phone]

## 2021-07-27 NOTE — PROGRESS NOTES
Problem: Falls - Risk of  Goal: *Absence of Falls  Description: Document Janet Samano Fall Risk and appropriate interventions in the flowsheet.   Outcome: Progressing Towards Goal  Note: Fall Risk Interventions:            Medication Interventions: Teach patient to arise slowly                   Problem: Patient Education: Go to Patient Education Activity  Goal: Patient/Family Education  Outcome: Progressing Towards Goal     Problem: Patient Education: Go to Patient Education Activity  Goal: Patient/Family Education  Outcome: Progressing Towards Goal     Problem: Patient Education: Go to Patient Education Activity  Goal: Patient/Family Education  Outcome: Progressing Towards Goal     Problem: Patient Education: Go to Patient Education Activity  Goal: Patient/Family Education  Outcome: Progressing Towards Goal     Problem: Patient Education: Go to Patient Education Activity  Goal: Patient/Family Education  Outcome: Progressing Towards Goal     Problem: TIA/CVA Stroke: 0-24 hours  Goal: Activity/Safety  Outcome: Progressing Towards Goal  Goal: Diagnostic Test/Procedures  Outcome: Progressing Towards Goal

## 2021-07-28 ENCOUNTER — PATIENT OUTREACH (OUTPATIENT)
Dept: CASE MANAGEMENT | Age: 86
End: 2021-07-28

## 2021-07-28 NOTE — PROGRESS NOTES
Care Transitions Initial Call    Call within 2 business days of discharge: Yes     Patient: Maryam Kirklandr Patient : 1935 MRN: [id number]    Last Discharge [address]       Complaint Diagnosis Description Type Department Provider    21 POSSIBLE STROKE  Cerebrovascular accident (CVA), unspecified mechanism ([facility] [address] . .. ED to Hosp-Admission (Discharged) (ADMIT) Simone Richter MD; Leon Gordon [place]. .. Was this an external facility discharge? No Discharge Facility: [facility] 2021 to 2021    Challenges to be reviewed by the provider   Additional needs identified to be addressed with provider: no  none         Method of communication with provider : none    Discussed COVID-19 related testing which was not done at this time. Test results were not done. Patient informed of results, if available? n/a     Advance Care Planning:   Does patient have an Advance Directive: decision makers updated. Inpatient Readmission Risk score: Unplanned Readmit Risk Score: 11    Was this a readmission? yes  Patient stated reason for the admission: CVA. Patient had hernia repair 2021    Patients top risk factors for readmission: medical condition-recovering from hernia repair sx and CVA and support system   Interventions to address risk factors: Scheduled appointment with PCP-[place] 2021 and Scheduled appointment with Specialist-[place] 2021    Care Transition Nurse (CTN) contacted the patient by telephone to perform post hospital discharge assessment. Verified name and  with patient as identifiers. Provided introduction to self, and explanation of the CTN role. CTN reviewed discharge instructions, medical action plan and red flags with patient who verbalized understanding. Were discharge instructions available to patient? yes. Reviewed appropriate site of care based on symptoms and resources available to patient including: PCP, Specialist and [address]. Patient given an opportunity to ask questions and does not have any further questions or concerns at this time. The patient agrees to contact the PCP office for questions related to their healthcare. Medication reconciliation was performed with caregiver, who verbalizes understanding of administration of home medications. Advised obtaining a 90-day supply of all daily and as-needed medications. Referral to Pharm D needed: no     Home Health/Outpatient orders at discharge: [address]: n/a  Date of initial visit: [address] [place] [address] ordered at discharge: None  [address]: [address] [place] [address] received: n/a    Covid Risk Education    Educated patient about risk for severe COVID-19 due to risk factors according to CDC guidelines. CTN reviewed discharge instructions, medical action plan and red flag symptoms with the patient who verbalized understanding. Discussed COVID vaccination status: yes. Education provided on COVID-19 vaccination as appropriate. Discussed exposure protocols and quarantine with CDC Guidelines. Patient was given an opportunity to verbalize any questions and concerns and agrees to contact CTN or health care provider for questions related to their healthcare. Was patient discharged with a pulse oximeter? no. Discussed and confirmed pulse oximeter discharge instructions and when to notify provider or seek emergency care. Discussed follow-up appointments. If no appointment was previously scheduled, appointment scheduling offered: yes.  Is follow up appointment scheduled within 7 days of discharge? yes. - for neurology  REHABILITATION [facility] follow up appointment(s):   Future Appointments   Date Time Provider Roslaee Bangura   8/4/2021  9:30 AM Fang Rushing NP [facility] BS AMB   8/12/2021  9:00 AM Manuel Colvin MD [facility] BS AMB     Non-[facility] follow up appointment(s): n/a    Plan for follow-up call in 7-10 days based on severity of symptoms and risk factors. Plan for next call: symptom management-ensure that pateint is still doing well with no residual effects   CTN provided contact information for future needs. Goals Addressed                 This Visit's Progress     Prevent complications post hospitalization. 1. CTN will monitor X 4 weeks    2. Ensure provider appt is scheduled within 7 days post-discharge 7/28/2021 patient has appt scheduled 8/12/2021 with PCP which is greater than 7 days out- will see if it can be moved up. Does have an appt with neurology within 7 days    3. Confirm patient attended post-discharge provider apt    4. Complete post-visit call to confirm attendance and update care needs  7/28/2021 Patient is doing remarkable. All feeling is back, no residual effects from stroke. 5. Review/educate common or potential \"red flags\" of condition worsening 7/28/2021 Reviewed signs/symptoms of stroke such as trouble walking, speaking and understanding, numbness or paralysis of the face, arm or leg, fatigue, headache, or weakness to name a few. 6. Evaluate adherence to medications and priority barriers to resolve   7/28/2021 Patient has all meds and is taking as directed. 7. Instruct on adherence to medications as ordered and assess for therapeutic response and side-effects  7/28/2021 Patient is aware of why he takes his meds and knows when to call physician for issues. 8. Discuss and evaluate ADL performance. Provide recommendations on energy conservation, particularly related to transition home from an inpatient admission. 7/28/2021 Patient does not use assistive devices. He has no residual effects from stroke. Doing well. Patient is moving about and rests as needed.

## 2021-08-04 ENCOUNTER — PATIENT OUTREACH (OUTPATIENT)
Dept: CASE MANAGEMENT | Age: 86
End: 2021-08-04

## 2021-08-04 ENCOUNTER — OFFICE VISIT (OUTPATIENT)
Dept: NEUROLOGY | Age: 86
End: 2021-08-04
Payer: MEDICARE

## 2021-08-04 VITALS
SYSTOLIC BLOOD PRESSURE: 122 MMHG | WEIGHT: 168.6 LBS | DIASTOLIC BLOOD PRESSURE: 78 MMHG | RESPIRATION RATE: 14 BRPM | OXYGEN SATURATION: 100 % | HEART RATE: 61 BPM | BODY MASS INDEX: 24.19 KG/M2

## 2021-08-04 DIAGNOSIS — I63.49 CEREBROVASCULAR ACCIDENT (CVA) DUE TO EMBOLISM OF OTHER CEREBRAL ARTERY (HCC): Primary | ICD-10-CM

## 2021-08-04 PROCEDURE — G8536 NO DOC ELDER MAL SCRN: HCPCS | Performed by: NURSE PRACTITIONER

## 2021-08-04 PROCEDURE — G8420 CALC BMI NORM PARAMETERS: HCPCS | Performed by: NURSE PRACTITIONER

## 2021-08-04 PROCEDURE — G0463 HOSPITAL OUTPT CLINIC VISIT: HCPCS | Performed by: NURSE PRACTITIONER

## 2021-08-04 PROCEDURE — 99214 OFFICE O/P EST MOD 30 MIN: CPT | Performed by: NURSE PRACTITIONER

## 2021-08-04 PROCEDURE — 1111F DSCHRG MED/CURRENT MED MERGE: CPT | Performed by: NURSE PRACTITIONER

## 2021-08-04 PROCEDURE — 1101F PT FALLS ASSESS-DOCD LE1/YR: CPT | Performed by: NURSE PRACTITIONER

## 2021-08-04 PROCEDURE — G8432 DEP SCR NOT DOC, RNG: HCPCS | Performed by: NURSE PRACTITIONER

## 2021-08-04 PROCEDURE — G8427 DOCREV CUR MEDS BY ELIG CLIN: HCPCS | Performed by: NURSE PRACTITIONER

## 2021-08-04 NOTE — PROGRESS NOTES
Juan Preciado is a 82 y.o. male who is in the office today as a hos. Follow up. 1. Have you been to the ER, urgent care clinic since your last visit? Hospitalized since your last visit? No    2. Have you seen or consulted any other health care providers outside of the [address] [place] [address] since your last visit? Include any pap smears or colon screening.  No

## 2021-08-04 NOTE — PROGRESS NOTES
Care Transitions Follow Up Call    Challenges to be reviewed by the provider   Additional needs identified to be addressed with provider: no  none           Method of communication with provider : none    Care Transition Nurse (CTN) contacted the patient by telephone to follow up after admission on 2021. Verified name and  with patient as identifiers. Addressed changes since last contact: none  Follow up appointment completed? yes. Was follow up appointment scheduled within 7 days of discharge? yes. Advance Care Planning:   Does patient have an Advance Directive: decision makers updated. CTN reviewed discharge instructions, medical action plan and red flags with patient and discussed any barriers to care and/or understanding of plan of care after discharge. Discussed appropriate site of care based on symptoms and resources available to patient including: PCP, Specialist and [employer]. The patient agrees to contact the PCP office for questions related to their healthcare. Patients top risk factors for readmission: medical condition-CVA recovery and support system   Interventions to address risk factors: Scheduled appointment with PCPBren and Scheduled appointment with Vyelvin Melchor -[facility] follow up appointment(s):   Future Appointments   Date Time Provider Rosalee Bangura   2021  9:00 AM Sandra West MD [facility] BS [facility]     Non-BS follow up appointment(s): n/a    CTN provided contact information for future needs. Plan for follow-up call in 7-10 days based on severity of symptoms and risk factors. Plan for next call: follow up appointment-ensure that patient attended appts. Goals Addressed                 This Visit's Progress     Prevent complications post hospitalization. 1. CTN will monitor X 4 weeks    2.  Ensure provider appt is scheduled within 7 days post-discharge 2021 patient has appt scheduled 2021 with PCP which is greater than 7 days out- will see if it can be moved up. Does have an appt with neurology within 7 days 8/4/2021 Patient sees cardiology next week    3. Confirm patient attended post-discharge provider apt 8/4/2021 Patient saw neurology today. Doing well. 4. Complete post-visit call to confirm attendance and update care needs  7/28/2021 Patient is doing remarkable. All feeling is back, no residual effects from stroke. 8/4/2021 Patient stated that he is doing well. Feels he is back to baseline. 5. Review/educate common or potential \"red flags\" of condition worsening 7/28/2021 Reviewed signs/symptoms of stroke such as trouble walking, speaking and understanding, numbness or paralysis of the face, arm or leg, fatigue, headache, or weakness to name a few. 6. Evaluate adherence to medications and priority barriers to resolve   7/28/2021 Patient has all meds and is taking as directed. 8/4/2021 Patient has all meds and is taking as prescribed. 7. Instruct on adherence to medications as ordered and assess for therapeutic response and side-effects  7/28/2021 Patient is aware of why he takes his meds and knows when to call physician for issues. 8/4/2021 No concerns about his meds. Takes without difficulty. 8. Discuss and evaluate ADL performance. Provide recommendations on energy conservation, particularly related to transition home from an inpatient admission. 7/28/2021 Patient does not use assistive devices. He has no residual effects from stroke. Doing well. Patient is moving about and rests as needed. 8/4/2021 patient able to complete all tasks that are needed. He walks frequently and cares for his wife with alzheimer's. No use of assistive devices.            Care Transitions Follow Up C

## 2021-08-04 NOTE — PROGRESS NOTES
[facility]  [address], [address], [place], [address]  [address] [place], [address]  Office:  [phone]  Fax: [phone]  Chief Complaint   Patient presents with   [facility] Follow Up     CVA       HPI: Bindu Dillon presents in hospital follow-up for stroke. He was admitted to [facility] from 7/25/2021-7/27/2021. He was seen by teleneurology in the ED. It was noted that he had a history of stroke in the past that only made him \"woozy\" despite a large area of encephalomalacia on CT. He stopped his aspirin on Friday, 7/23/2021 due to an inguinal hernia repair he had that day. At about 1520 on the date of presentation his left face drooped and the left upper extremity became flaccid and unable to move. His daughter gave him aspirin 325 mg. In the ER he felt better but had some trouble with . By the time he was seen by teleneurology was noted that his  had returned. He denied any strange feeling or movements that might of suggested a seizure. Blood pressure was 165/87. NIHSS was 2. He and his family had a discussion with teleneurology regarding IV TPA but it was felt that the risks outweighed the benefits so it was deferred. He was seen in neurology consultation by Dr. Qamar Kennedy on 7/26/2021. Additional history was that the onset of symptoms was sudden. He was sitting on the couch when all of a sudden he started losing his . It is also noted that he has a history of PAF. In the past it was advised to not take any anticoagulation due to the risk of bleeding. When seen by Dr. Qamar Kennedy it was noted that he was feeling much better at that time. He did not have any symptoms prior to that event. His left hand weakness was much better. He had no recent change in medications. He was just taking ibuprofen for pain management at home. He had been on aspirin and Crestor.   He was not able to substitute Crestor for anything else because he was allergic to it. Denied smoking cigarettes or drinking alcohol. It was noted that he is currently living with his daughter since surgery as his wife has Alzheimer's dementia. Past medical history includes CAD and bypass surgery, ruptured appendix at age 27, stroke in 2018, and PAF. MRI of the brain reported a punctate focus of acute ischemia in the right parietal centrum semiovale subcortical white matter. Large chronic right parieto-occipital cortical infarction. CTA head and neck reported patent intra and extracranial brain arteries. TTE as below. It was suspected that his prior stroke was also related to atrial fibrillation. He was recommended to start on [facility] ASAP because stroke is very small and he has low risk of secondary hemorrhage. He presents today in follow-up. He is with daughter. He feels well. His left hand strength feels back to baseline. Reports some baseline LUE weakness due to prior injuries. He is ambidextrous, writes right handed, bats and golfs left handed. Left leg was not affected. Facial weakness is back to normal.  Reports no lasting deficits after the stroke in 2018 after 6-8 months (had lost left side use at that time especially the leg). Normally lives at home with his wife but currently with his daughter after hernia repair surgery. He is independent in ADLs. His daughter reports they have contacted his cardiologist Dr. Roxane Nash. He has a consult next week to be seen to consider at [facility] device. The plan is to not continue anticoagulation long term. /78 today. He does not smoke or drink alcohol. Denies problems swallowing. They have home speech exercises. Daughter says his speech is back to normal.  He is on Eliquis 5 mg twice daily. Aspirin was stopped. He is on Crestor 40 mg. Hemoglobin A1c 5.9. Total cholesterol was 151 and LDL-C 85.8. His wife has Alzheimers and he takes care of her. They will be  64 years in December.   He walks frequently for exercise. 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 Dr Artis Coy GRAFT  09/2010    LIMA - LAD/D1,  SVG - OM,  SVG - PDA    [facility]ERNIA REPAIR  [facility]       Current Outpatient Medications   Medication Sig Dispense Refill    furosemide (LASIX) 20 mg tablet Take one pill by mouth every morning. 30 Tablet 0    famotidine (PEPCID) 20 mg tablet Take 1 Tablet by mouth daily. 30 Tablet 0    rosuvastatin (CRESTOR) 40 mg tablet Take 1 Tablet by mouth daily. 30 Tablet 0    apixaban (ELIQUIS) 5 mg tablet Take 1 Tablet by mouth two (2) times a day. Indications: treatment to prevent blood clots in chronic atrial fibrillation 60 Tablet 0    CHOLECALCIFEROL, VITAMIN D3, PO Take 5,000 Units by mouth.  VITAMIN B COMPLEX PO Take  by mouth.  ondansetron (ZOFRAN ODT) 4 mg disintegrating tablet Take 4 mg by mouth.  MULTIVITAMIN PO Take  by mouth daily.           Allergies   Allergen Reactions    Atorvastatin Myalgia and Other (comments)     Myalgia    Pcn [Penicillins] Other (comments)     Diaphoretic, elevates blood pressure, insomnia    Pravastatin Myalgia and Other (comments)     Myalgia    Amoxicillin Other (comments)     Felt shaky and nervous    Rosuvastatin Other (comments)     Can only take name brand CRESTOR       Social History     Tobacco Use    Smoking status: Never Smoker    Smokeless tobacco: Never Used   Substance Use Topics    Alcohol use: No    Drug use: No       Family History   Problem Relation Age of Onset    Cancer Mother         liver       Review of Systems:  GENERAL: Denies fever or fatigue  CARDIAC: No CP or SOB  PULMONARY: No cough or SOB  MUSCULOSKELETAL: No new joint pain  NEURO: SEE HPI    Physical Examination:  Visit Vitals  /78   Pulse 61   Resp 14   Wt 76.5 kg (168 lb 9.6 oz)   SpO2 100%   BMI 24.19 kg/m²       Alert, in NAD. Heart is regular. Oriented x3. Speech is fluent. Speech clear. EOMs are full, PERRL, VFFTC, no nystagmus. No facial asymmetry. Tongue is midline. Strength and tone are normal. No drift of the bilateral upper extremities. Fine finger movements symmetrical. FNF intact bilaterally. Mild BUE action tremor L>R. DTRs +2, gait symmetric and steady. TTE 7/26/2021: Interpretation Summary    · LV: Estimated LVEF is 35 - 40%. Visually measured ejection fraction. Mildly dilated left ventricle. Mild concentric hypertrophy. Moderate (grade 2) left ventricular diastolic dysfunction. · PA: Pulmonary hypertension. Pulmonary arterial systolic pressure is 37 mmHg. · IVC: Mildly elevated central venous pressure (8 mmHg); IVC diameter is less than 21 mm and collapses less than 50% with respiration. · LA: Severely dilated left atrium. Left Atrium volume index is 55 mL/m2. · RA: Dilated right atrium. · MV: Mild mitral valve regurgitation is present. · AV: Aortic valve leaflet calcification present. · IAS: Agitated saline contrast study was performed. There was no shunting with Valsalva. · Saline contrast was given to evaluate for intracardiac shunt. Impression/Plan: This is an 68-year-old ambidextrous male who presents in follow-up for stroke. He has risk factor including PAF. He was on antiplatelet with aspirin but was off aspirin at the time due to surgery.   MRI reported punctate focus of acute ischemia in the right parietal centrum semiovale subcortical white matter. He was started on oral anticoagulation in the hospital with apixaban. He continues on statin. They have been corresponding with his cardiologist and the plan is for him to be evaluated for consideration of watchman device. He has recovered, and is back to baseline without left-sided deficits. He is not in need of home health or outpatient therapy services. Went over [facility]'s policy on TIA/stroke and recommended although he is fully recovered to refrain from driving for at least 3 months time a long as free from recurrence of symptoms. Discussed signs or symptoms of stroke/reasons to call 911 and present to the ED. Diagnoses and all orders for this visit:    1. 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 Signed By: Beryle Poncho, NP        PLEASE NOTE:   Portions of this document may have been produced using voice recognition software. Unrecognized errors in transcription may be present.

## 2021-08-09 PROBLEM — R29.810 FACIAL DROOP: Status: RESOLVED | Noted: 2021-07-25 | Resolved: 2021-08-09

## 2021-08-09 PROBLEM — R29.898 LEFT ARM WEAKNESS: Status: RESOLVED | Noted: 2021-07-25 | Resolved: 2021-08-09

## 2021-08-09 PROBLEM — I48.91 ATRIAL FIBRILLATION (HCC): Status: RESOLVED | Noted: 2020-12-24 | Resolved: 2021-08-09

## 2021-08-10 NOTE — PROGRESS NOTES
Patient being seen today for transitional care management    Patient was admitted to [facility] from 7/25-27/21              Initial interactive contact was done by nurse navigator     I thoroughly reviewed the discharge summary, notes, consults, labs and imaging studies in the electronic record. Pertinent details are summarized below and the record has been updated to reflect recent events    He had presented with left upper extremity weakness along with left facial droop. Initial CT was negative but MRI showed acute ischemic change in the right parietal semiovale white matter. He was seen by Dr. Pinzon/neurology who suggested getting the patient started on anticoagulation so Eliquis was begun. CTA head and neck negative. Echo showed negative bubble study but possibly cardiomyopathy with EF 35% and pulm htn w pap 57. He is currently followed by Dr. Roseline Jones and they are actually considering doing the watchman procedure for the patient and he will be seeing him later this month. He appears to have had a full recovery. They did increase his Crestor from 20 to 40 as we had previously recommended.     LAST MEDICARE WELLNESS EXAM: 7/10/15, 3/8/17, 12/28/20    Past Medical History:   Diagnosis Date    Atrial fibrillation (HCC)     Chads2 2; no OAC due to h/o cryptogenic ICH    BPH     on CT 8/19    Cardiomyopathy ([facility] 07/2021    Coronary artery disease     cath 2010 LM 20-30%, LAD 60-70%, Cx 100%, RCA prox 70&, mid 80%, ef 45%    GERD     H/O echocardiogram 07/2021    ef 35-40%, mild cLVH, severe dilated LV, gr 2 dd, pulm htn pap 57, severe LAE, KENNEDY, neg bubble study    Hyperlipidemia     Nontraumatic hemorrhage of right cerebral hemisphere ([address] 05/08/2018    SOH; cryptogenic    Peptic ulcer disease     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   Tobacco Use    Smoking status: Never Smoker    Smokeless tobacco: Never Used   Substance and Sexual Activity    Alcohol use: No    Drug use: No    Sexual activity: Yes     Partners: Female     Birth control/protection: None   Other Topics Concern    Not on file   Social History Narrative    Not on file     Social Determinants of Health     Financial Resource Strain:     Difficulty of Paying Living Expenses:    Food Insecurity:     Worried About Running Out of Food in the Last Year:     [address] in the Last Year:    Transportation Needs:     Lack of Transportation (Medical):  Lack of Transportation (Non-Medical):    Physical Activity:     Days of Exercise per Week:     Minutes of Exercise per Session:    Stress:     Feeling of Stress :    Social Connections:     Frequency of Communication with Friends and Family:     Frequency of Social Gatherings with Friends and Family:     Attends Scientologist Services:     Active Member of Clubs or Organizations:     Attends Club or Organization Meetings:     Marital Status:    Intimate Partner Violence:     Fear of Current or Ex-Partner:     Emotionally Abused:     Physically Abused:     Sexually Abused:      Current Outpatient Medications   Medication Sig    furosemide (LASIX) 20 mg tablet Take one pill by mouth every morning.     famotidine (PEPCID) 20 mg tablet Take 1 Tablet by mouth daily.  rosuvastatin (CRESTOR) 40 mg tablet Take 1 Tablet by mouth daily.  apixaban (ELIQUIS) 5 mg tablet Take 1 Tablet by mouth two (2) times a day. Indications: treatment to prevent blood clots in chronic atrial fibrillation    CHOLECALCIFEROL, VITAMIN D3, PO Take 5,000 Units by mouth.  VITAMIN B COMPLEX PO Take  by mouth.  ondansetron (ZOFRAN ODT) 4 mg disintegrating tablet Take 4 mg by mouth.  MULTIVITAMIN PO Take  by mouth daily. No current facility-administered medications for this visit. Allergies   Allergen Reactions    Atorvastatin Myalgia and Other (comments)     Myalgia    Pcn [Penicillins] Other (comments)     Diaphoretic, elevates blood pressure, insomnia    Pravastatin Myalgia and Other (comments)     Myalgia    Amoxicillin Other (comments)     Felt shaky and nervous    Rosuvastatin Other (comments)     Can only take name brand CRESTOR     REVIEW OF SYSTEMS: colo 2013-14 Dr Kathleen Vizcarra? Ophtho  no vision change or eye pain  Oral  no mouth pain, tongue or tooth problems  Ears  no hearing loss, ear pain, fullness, no swallowing problems  Cardiac  no CP, PND, orthopnea, edema, palpitations or syncope  Chest  no breast masses  Resp  no wheezing, chronic coughing, dyspnea  GI  no heartburn, nausea, vomiting, change in bowel habits, bleeding, hemorrhoids  Urinary  no dysuria, hematuria, flank pain, urgency, frequency    Visit Vitals  /75 (BP 1 Location: Right upper arm, BP Patient Position: Sitting)   Pulse 62   Temp 98.1 °F (36.7 °C) (Temporal)   Resp 16   Ht 5' 10\" (1.778 m)   Wt 168 lb 6.4 oz (76.4 kg)   SpO2 99%   BMI 24.16 kg/m²     A&O x3  Affect is appropriate. Mood stable  No apparent distress  Anicteric, no JVD, adenopathy or thyromegaly. No carotid bruits or radiated murmur  Lungs clear to auscultation, no wheezes or rales  Heart showed irregular rhythm.  No murmur, rubs, gallops  Abdomen soft nontender, no hepatosplenomegaly or masses. Extremities without edema. Pulses 1-2+ symmetrically    LABS  From 3/12 showed gluc 89, cr 0.87, gfr>60, alt 18,       chol 125, tg 72,   hdl 43, ldl-c 68,   wbc 3.3, hb 13.5, plt 149  From 6/13 showed                     chol 123, tg 75,   hdl 42, ldl-c 66,                     tsh 3.28  From 7/15 showed gluc 92, cr 0.90, gfr>60, alt<5,       chol 189, tg 117, hdl 43, ldl-c 123, wbc 4.2, hb 13.6, plt 152, tsh 2.75, ua neg  From 3/17 showed gluc 88, cr 0.96, gfr>60, alt 28,       chol 212, tg 105, hdl 46, ldl-c 145, wbc 4.1, hb 13.6, plt 172, tsh 2.57  From 8/18 showed gluc 90, cr 0.89, gfr>60, alt 27,       chol 189, tg 84,   hdl 55, ldl-c 117, wbc 3.9, hb 13.3, plt 141,  From 6/20 showed gluc 76, cr 0.91, gfr>60, alt 35,       chol 151, tg 62,   hdl 57, ldl-c 82  From 7/21 showed gluc 88, cr 0.73, gfr>60            hba1c 5.9, chol 151, tg 86,   hdl 48, ldl-c 86,   wbc 4.1, hb 12.2, plt 13.2, tsh 3.98    We reviewed the patient's labs from the last several visits to point out trends in the numbers        Patient Active Problem List   Diagnosis Code    GERD (gastroesophageal reflux disease) K21.9    Coronary artery disease I25.10    Advance directive discussed with patient Z71.89    Hyperlipidemia E78.5    History of intracranial hemorrhage Z86.79    S/P CABG x 4 Z95.1    CVA (cerebral vascular accident) ([address] I63.9    Paroxysmal atrial fibrillation (HCC) I48.0    Cardiomyopathy ([address] I42.9     Assessment and plan:  1. GERD. Avoidance measures  2. CHD. Continue current regimen and f/u Dr Diana Emery  3. HLP. Recheck lipids 12/21. Suspect will need Zetia on top of the increased Crestor dose  4. Afib. Rate controlled, now on NOAC. Being considered for watchman procedure and follow-up Dr. Diana Emery as above    NEW ISSUES  5. Recent stroke. Appears to have had full recovery. Now on NOAC  6. Possible cardiomyopathy. Per Dr. Diana Emery  7.   Apparent pulmonary hypertension. Per Dr. Clarissa López. RTC 11/21    Above conditions discussed at length and patient vocalized understanding. All questions answered to patient satisfaction    Note was forwarded to Dr. Clarissa López with patient's approval        ICD-10-CM ICD-9-CM    1. Atherosclerosis of native coronary artery of native heart without angina pectoris  I25.10 414.01    2. Cerebrovascular accident (CVA) due to embolism of other cerebral artery (HCC)  I63.49 434.11    3. Paroxysmal atrial fibrillation (HCC)  I48.0 427.31    4. History of intracranial hemorrhage  Z86.79 V12.59    5. Hyperlipidemia, unspecified hyperlipidemia type  E78.5 272.4    6. Cardiomyopathy, unspecified type ([facility]  I42.9 425.4    7.  Pulmonary hypertension (HCC)  I27.20 416.8

## 2021-08-11 ENCOUNTER — PATIENT OUTREACH (OUTPATIENT)
Dept: CASE MANAGEMENT | Age: 86
End: 2021-08-11

## 2021-08-11 NOTE — PROGRESS NOTES
Care Transitions Follow Up Call    Challenges to be reviewed by the provider   Additional needs identified to be addressed with provider: no  none           Method of communication with provider : none    Care Transition Nurse (CTN) contacted the patient by telephone to follow up after admission on 2021. Verified name and  with patient as identifiers. Addressed changes since last contact: none  Follow up appointment completed? yes. Was follow up appointment scheduled within 7 days of discharge? yes. Advance Care Planning:   Does patient have an Advance Directive: decision makers updated. CTN reviewed discharge instructions, medical action plan and red flags with patient and discussed any barriers to care and/or understanding of plan of care after discharge. Discussed appropriate site of care based on symptoms and resources available to patient including: PCP and [employer]. The patient agrees to contact the PCP office for questions related to their healthcare. Patients top risk factors for readmission: medical condition-recovering from CVA and support system   Interventions to address risk factors: Scheduled appointment with PCP-Harpreet Lau Dr follow up appointment(s):   Future Appointments   Date Time Provider Rosalee Bangura   2021  9:00 AM Lyndia Dandy, MD [facility]     Non-[facility] follow up appointment(s): n/a    CTN provided contact information for future needs. Plan for follow-up call in 7-10 days based on severity of symptoms and risk factors. Plan for next call: medication management-ensure that patient has all meds and understands when to take and why he is taking. Goals Addressed                 This Visit's Progress     Prevent complications post hospitalization. 1. CTN will monitor X 4 weeks    2.  Ensure provider appt is scheduled within 7 days post-discharge 2021 patient has appt scheduled 2021 with PCP which is greater than 7 days out- will see if it can be moved up. Does have an appt with neurology within 7 days 8/4/2021 Patient sees cardiology next week    3. Confirm patient attended post-discharge provider apt 8/4/2021 Patient saw neurology today. Doing well. 4. Complete post-visit call to confirm attendance and update care needs  7/28/2021 Patient is doing remarkable. All feeling is back, no residual effects from stroke. 8/4/2021 Patient stated that he is doing well. Feels he is back to baseline. 8/11/2021 Patient continues to do well. No care needs noted. 5. Review/educate common or potential \"red flags\" of condition worsening 7/28/2021 Reviewed signs/symptoms of stroke such as trouble walking, speaking and understanding, numbness or paralysis of the face, arm or leg, fatigue, headache, or weakness to name a few. 6. Evaluate adherence to medications and priority barriers to resolve   7/28/2021 Patient has all meds and is taking as directed. 8/4/2021 Patient has all meds and is taking as prescribed. 8/11/2021 Patient continues to have all meds and is taking as he was told. 7. Instruct on adherence to medications as ordered and assess for therapeutic response and side-effects  7/28/2021 Patient is aware of why he takes his meds and knows when to call physician for issues. 8/4/2021 No concerns about his meds. Takes without difficulty. 8/11/2021 Patient stated that he has no concerns about his meds today. 8. Discuss and evaluate ADL performance. Provide recommendations on energy conservation, particularly related to transition home from an inpatient admission. 7/28/2021 Patient does not use assistive devices. He has no residual effects from stroke. Doing well. Patient is moving about and rests as needed. 8/4/2021 patient able to complete all tasks that are needed. He walks frequently and cares for his wife with alzheimer's. No use of assistive devices. 8/11/2021 Patient keeps busy. He is back to his baseline.  He cares for his wife who has alzheimer's. No complaints.

## 2021-08-12 ENCOUNTER — DOCUMENTATION ONLY (OUTPATIENT)
Dept: INTERNAL MEDICINE CLINIC | Age: 86
End: 2021-08-12

## 2021-08-12 ENCOUNTER — OFFICE VISIT (OUTPATIENT)
Dept: INTERNAL MEDICINE CLINIC | Age: 86
End: 2021-08-12
Payer: MEDICARE

## 2021-08-12 VITALS
RESPIRATION RATE: 16 BRPM | DIASTOLIC BLOOD PRESSURE: 75 MMHG | TEMPERATURE: 98.1 F | BODY MASS INDEX: 24.11 KG/M2 | OXYGEN SATURATION: 99 % | HEIGHT: 70 IN | SYSTOLIC BLOOD PRESSURE: 134 MMHG | WEIGHT: 168.4 LBS | HEART RATE: 62 BPM

## 2021-08-12 DIAGNOSIS — I63.49 CEREBROVASCULAR ACCIDENT (CVA) DUE TO EMBOLISM OF OTHER CEREBRAL ARTERY (HCC): ICD-10-CM

## 2021-08-12 DIAGNOSIS — Z86.79 HISTORY OF INTRACRANIAL HEMORRHAGE: ICD-10-CM

## 2021-08-12 DIAGNOSIS — I48.0 PAROXYSMAL ATRIAL FIBRILLATION (HCC): ICD-10-CM

## 2021-08-12 DIAGNOSIS — I27.20 PULMONARY HYPERTENSION (HCC): ICD-10-CM

## 2021-08-12 DIAGNOSIS — I42.9 CARDIOMYOPATHY, UNSPECIFIED TYPE (HCC): ICD-10-CM

## 2021-08-12 DIAGNOSIS — I25.10 ATHEROSCLEROSIS OF NATIVE CORONARY ARTERY OF NATIVE HEART WITHOUT ANGINA PECTORIS: Primary | ICD-10-CM

## 2021-08-12 DIAGNOSIS — E78.5 HYPERLIPIDEMIA, UNSPECIFIED HYPERLIPIDEMIA TYPE: ICD-10-CM

## 2021-08-12 PROCEDURE — 1101F PT FALLS ASSESS-DOCD LE1/YR: CPT | Performed by: INTERNAL MEDICINE

## 2021-08-12 PROCEDURE — G8420 CALC BMI NORM PARAMETERS: HCPCS | Performed by: INTERNAL MEDICINE

## 2021-08-12 PROCEDURE — G8427 DOCREV CUR MEDS BY ELIG CLIN: HCPCS | Performed by: INTERNAL MEDICINE

## 2021-08-12 PROCEDURE — G8510 SCR DEP NEG, NO PLAN REQD: HCPCS | Performed by: INTERNAL MEDICINE

## 2021-08-12 PROCEDURE — G8536 NO DOC ELDER MAL SCRN: HCPCS | Performed by: INTERNAL MEDICINE

## 2021-08-12 PROCEDURE — 1111F DSCHRG MED/CURRENT MED MERGE: CPT | Performed by: INTERNAL MEDICINE

## 2021-08-12 PROCEDURE — 99215 OFFICE O/P EST HI 40 MIN: CPT | Performed by: INTERNAL MEDICINE

## 2021-08-12 PROCEDURE — G0463 HOSPITAL OUTPT CLINIC VISIT: HCPCS | Performed by: INTERNAL MEDICINE

## 2021-08-12 NOTE — PROGRESS NOTES
Yany Davila presents today for   Chief Complaint   Patient presents with    Follow-up       Is someone accompanying this pt? no    Is the patient using any DME equipment during OV? no    Coordination of Care:  1. Have you been to the ER, urgent care clinic since your last visit? Hospitalized since your last visit? no    2. Have you seen or consulted any other health care providers outside of the [address] [place] [address] since your last visit? Include any pap smears or colon screening.  no

## 2021-08-17 ENCOUNTER — PATIENT OUTREACH (OUTPATIENT)
Dept: CASE MANAGEMENT | Age: 86
End: 2021-08-17

## 2021-08-17 NOTE — PROGRESS NOTES
Care Transitions Follow Up Call    Challenges to be reviewed by the provider   Additional needs identified to be addressed with provider: no  none           Method of communication with provider : none    Care Transition Nurse (CTN) contacted the patient by telephone to follow up after admission on 2021. Verified name and  with patient as identifiers. Addressed changes since last contact: none  Follow up appointment completed? yes. Was follow up appointment scheduled within 7 days of discharge? no.     Advance Care Planning:   Does patient have an Advance Directive: decision makers updated. CTN reviewed discharge instructions, medical action plan and red flags with patient and discussed any barriers to care and/or understanding of plan of care after discharge. Discussed appropriate site of care based on symptoms and resources available to patient including: PCP and [employer]. The patient agrees to contact the PCP office for questions related to their healthcare. Patients top risk factors for readmission: medical condition-recovering from CVA and support system   Interventions to address risk factors: Scheduled appointment with PCP-[facility] follow up appointment(s):   Future Appointments   Date Time Provider Rosalee Bangura   2021 10:35 AM [facility] NURSE VISIT [facility]   2021  1:00 PM Mirza Abarca MD [facility] BS [facility]     Non-[facility] follow up appointment(s): n/a    CTN provided contact information for future needs. Plan for follow-up call in 7-10 days based on severity of symptoms and risk factors. Plan for next call: medication management-ensure that patient has all meds and continues to take without difficulty     Goals Addressed                 This Visit's Progress     Prevent complications post hospitalization. 1. CTN will monitor X 4 weeks    2.  Ensure provider appt is scheduled within 7 days post-discharge 2021 patient has appt scheduled 2021 with PCP which is greater than 7 days out- will see if it can be moved up. Does have an appt with neurology within 7 days 8/4/2021 Patient sees cardiology next week    3. Confirm patient attended post-discharge provider apt 8/4/2021 Patient saw neurology today. Doing well. 8/17/2021 Saw PCP 8/12/2021    4. Complete post-visit call to confirm attendance and update care needs  7/28/2021 Patient is doing remarkable. All feeling is back, no residual effects from stroke. 8/4/2021 Patient stated that he is doing well. Feels he is back to baseline. 8/11/2021 Patient continues to do well. No care needs noted. 8/17/2021 Patient is doing well. Feels he is back to baseline- no issues. 5. Review/educate common or potential \"red flags\" of condition worsening 7/28/2021 Reviewed signs/symptoms of stroke such as trouble walking, speaking and understanding, numbness or paralysis of the face, arm or leg, fatigue, headache, or weakness to name a few. 6. Evaluate adherence to medications and priority barriers to resolve   7/28/2021 Patient has all meds and is taking as directed. 8/4/2021 Patient has all meds and is taking as prescribed. 8/11/2021 Patient continues to have all meds and is taking as he was told. 8/17/2021 Patient has all meds and is taking per physician order     7. Instruct on adherence to medications as ordered and assess for therapeutic response and side-effects  7/28/2021 Patient is aware of why he takes his meds and knows when to call physician for issues. 8/4/2021 No concerns about his meds. Takes without difficulty. 8/11/2021 Patient stated that he has no concerns about his meds today. 8/17/2021 No concerns about meds    8. Discuss and evaluate ADL performance. Provide recommendations on energy conservation, particularly related to transition home from an inpatient admission. 7/28/2021 Patient does not use assistive devices. He has no residual effects from stroke. Doing well.  Patient is moving about and rests as needed. 8/4/2021 patient able to complete all tasks that are needed. He walks frequently and cares for his wife with alzheimer's. No use of assistive devices. 8/11/2021 Patient keeps busy. He is back to his baseline. He cares for his wife who has alzheimer's. No complaints. 8/17/2021 Patient doing well. He is back to his activities. He rests when he can. Will follow.

## 2021-08-19 RX ORDER — FAMOTIDINE 20 MG/1
20 TABLET, FILM COATED ORAL DAILY
Qty: 30 TABLET | Refills: 2 | Status: SHIPPED
Start: 2021-08-19 | End: 2021-12-14

## 2021-08-19 RX ORDER — FUROSEMIDE 20 MG/1
TABLET ORAL
Qty: 30 TABLET | Refills: 2 | Status: SHIPPED | OUTPATIENT
Start: 2021-08-19 | End: 2021-11-15

## 2021-08-19 NOTE — TELEPHONE ENCOUNTER
These were last prescribed by a hospitalist. Please sign if appropriate. Last Visit: 8/12/21 with MD Kylah Bobby  Next Appointment: 12/1/21 with MD Kylah Bobby  Previous Refill Encounter(s): 7/27/21 30 d/s    Requested Prescriptions     Pending Prescriptions Disp Refills    furosemide (LASIX) 20 mg tablet 30 Tablet 2     Sig: Take one pill by mouth every morning.  famotidine (PEPCID) 20 mg tablet 30 Tablet 2     Sig: Take 1 Tablet by mouth daily.  apixaban (ELIQUIS) 5 mg tablet 60 Tablet 2     Sig: Take 1 Tablet by mouth two (2) times a day.  Indications: treatment to prevent blood clots in chronic atrial fibrillation

## 2021-08-27 ENCOUNTER — PATIENT OUTREACH (OUTPATIENT)
Dept: CASE MANAGEMENT | Age: 86
End: 2021-08-27

## 2021-08-27 NOTE — PROGRESS NOTES
Patient has graduated from the Transitions of Care Coordination  program on 8/27/2021. Patient's symptoms are stable at this time. Patient/family has the ability to self-manage. Care management goals have been completed at this time. No further care transitions nurse follow up scheduled. Goals Addressed                 This Visit's Progress     COMPLETED: Prevent complications post hospitalization. 1. CTN will monitor X 4 weeks    2. Ensure provider appt is scheduled within 7 days post-discharge 7/28/2021 patient has appt scheduled 8/12/2021 with PCP which is greater than 7 days out- will see if it can be moved up. Does have an appt with neurology within 7 days 8/4/2021 Patient sees cardiology next week    3. Confirm patient attended post-discharge provider apt 8/4/2021 Patient saw neurology today. Doing well. 8/17/2021 Saw PCP 8/12/2021. 8/27/2021 Patient has seen neurology, cardiology, cardia surgeon and all said that he is doing well. 4. Complete post-visit call to confirm attendance and update care needs  7/28/2021 Patient is doing remarkable. All feeling is back, no residual effects from stroke. 8/4/2021 Patient stated that he is doing well. Feels he is back to baseline. 8/11/2021 Patient continues to do well. No care needs noted. 8/17/2021 Patient is doing well. Feels he is back to baseline- no issues. 8/27/2021 Patient is doing very well. He is having watchman procedure on 9/14/2021. No care needs noted at present. time. 5. Review/educate common or potential \"red flags\" of condition worsening 7/28/2021 Reviewed signs/symptoms of stroke such as trouble walking, speaking and understanding, numbness or paralysis of the face, arm or leg, fatigue, headache, or weakness to name a few. 6. Evaluate adherence to medications and priority barriers to resolve   7/28/2021 Patient has all meds and is taking as directed. 8/4/2021 Patient has all meds and is taking as prescribed.  8/11/2021 Patient continues to have all meds and is taking as he was told. 8/17/2021 Patient has all meds and is taking per physician order 8/27/2021 Patient has all meds and is taking she he should    7. Instruct on adherence to medications as ordered and assess for therapeutic response and side-effects  7/28/2021 Patient is aware of why he takes his meds and knows when to call physician for issues. 8/4/2021 No concerns about his meds. Takes without difficulty. 8/11/2021 Patient stated that he has no concerns about his meds today. 8/17/2021 No concerns about meds 8/27/2021 Patient has no concerns at present time. 8. Discuss and evaluate ADL performance. Provide recommendations on energy conservation, particularly related to transition home from an inpatient admission. 7/28/2021 Patient does not use assistive devices. He has no residual effects from stroke. Doing well. Patient is moving about and rests as needed. 8/4/2021 patient able to complete all tasks that are needed. He walks frequently and cares for his wife with alzheimer's. No use of assistive devices. 8/11/2021 Patient keeps busy. He is back to his baseline. He cares for his wife who has alzheimer's. No complaints. 8/17/2021 Patient doing well. He is back to his activities. He rests when he can. Will follow. 8/27/2021 Patient stated that he is back to daily activities. No complaints. He rests as needed. Patient has care transitions nurse contact information for any further questions, concerns, or needs.   Patient's upcoming visits:    Future Appointments   Date Time Provider Rosalee Bangura   11/24/2021 10:35 AM [facility] NURSE VISIT [facility] DOREEN GERBER   12/1/2021  1:00 PM MD KAREN Quintero DOREEN GERBER

## 2021-10-13 NOTE — TELEPHONE ENCOUNTER
Cleve Becerra is requesting a new Rx  [facility] were [facility] to [facility] was last prescribed by another physician. Please sign if appropriate. Last Visit: 8/12/21 with MD Beba Bergman  Next Appointment: 12/1/21 with MD Beba Bergman  Previous Refill Encounter(s): 8/19/21 Eliquis #60 with 2 refills, 7/27/21 Crestor #30    Requested Prescriptions     Pending Prescriptions Disp Refills    apixaban (ELIQUIS) 5 mg tablet 60 Tablet 2     Sig: Take 1 Tablet by mouth two (2) times a day. Indications: treatment to prevent blood clots in chronic atrial fibrillation    rosuvastatin (CRESTOR) 40 mg tablet 90 Tablet 1     Sig: Take 1 Tablet by mouth daily.

## 2021-10-15 RX ORDER — ROSUVASTATIN CALCIUM 40 MG/1
40 TABLET, COATED ORAL DAILY
Qty: 90 TABLET | Refills: 1 | Status: SHIPPED | OUTPATIENT
Start: 2021-10-15 | End: 2022-06-28 | Stop reason: SINTOL

## 2021-11-15 RX ORDER — FUROSEMIDE 20 MG/1
TABLET ORAL
Qty: 30 TABLET | Refills: 2 | Status: SHIPPED | OUTPATIENT
Start: 2021-11-15 | End: 2022-02-20

## 2021-11-24 ENCOUNTER — APPOINTMENT (OUTPATIENT)
Dept: INTERNAL MEDICINE CLINIC | Age: 86
End: 2021-11-24

## 2021-11-24 ENCOUNTER — HOSPITAL ENCOUNTER (OUTPATIENT)
Dept: LAB | Age: 86
Discharge: HOME | End: 2021-11-24
Payer: MEDICARE

## 2021-11-24 DIAGNOSIS — I25.10 ATHEROSCLEROSIS OF NATIVE CORONARY ARTERY OF NATIVE HEART WITHOUT ANGINA PECTORIS: ICD-10-CM

## 2021-11-24 DIAGNOSIS — E78.5 HYPERLIPIDEMIA, UNSPECIFIED HYPERLIPIDEMIA TYPE: ICD-10-CM

## 2021-11-24 LAB
ALBUMIN SERPL-MCNC: 3.8 G/DL (ref 3.4–5)
ALBUMIN/GLOB SERPL: 1.1 {RATIO} (ref 0.8–1.7)
ALP SERPL-CCNC: 121 U/L (ref 45–117)
ALT SERPL-CCNC: 29 U/L (ref 16–61)
ANION GAP SERPL CALC-SCNC: 4 MMOL/L (ref 3–18)
AST SERPL-CCNC: 23 U/L (ref 10–38)
BILIRUB SERPL-MCNC: 0.3 MG/DL (ref 0.2–1)
BUN SERPL-MCNC: 26 MG/DL (ref 7–18)
BUN/CREAT SERPL: 28 (ref 12–20)
CALCIUM SERPL-MCNC: 9.9 MG/DL (ref 8.5–10.1)
CHLORIDE SERPL-SCNC: 108 MMOL/L (ref 100–111)
CHOLEST SERPL-MCNC: 126 MG/DL
CO2 SERPL-SCNC: 30 MMOL/L (ref 21–32)
CREAT SERPL-MCNC: 0.93 MG/DL (ref 0.6–1.3)
ERYTHROCYTE [DISTWIDTH] IN BLOOD BY AUTOMATED COUNT: 13.9 % (ref 11.6–14.5)
GLOBULIN SER CALC-MCNC: 3.5 G/DL (ref 2–4)
GLUCOSE SERPL-MCNC: 79 MG/DL (ref 74–99)
HCT VFR BLD AUTO: 38.7 % (ref 36–48)
HDLC SERPL-MCNC: 58 MG/DL (ref 40–60)
HDLC SERPL: 2.2 {RATIO} (ref 0–5)
HGB BLD-MCNC: 12.4 G/DL (ref 13–16)
LDLC SERPL CALC-MCNC: 59 MG/DL (ref 0–100)
LIPID PROFILE,FLP: NORMAL
MCH RBC QN AUTO: 30.9 PG (ref 24–34)
MCHC RBC AUTO-ENTMCNC: 32 G/DL (ref 31–37)
MCV RBC AUTO: 96.5 FL (ref 78–100)
NRBC # BLD: 0 K/UL (ref 0–0.01)
NRBC BLD-RTO: 0 PER 100 WBC
PLATELET # BLD AUTO: 144 K/UL (ref 135–420)
PMV BLD AUTO: 10.4 FL (ref 9.2–11.8)
POTASSIUM SERPL-SCNC: 4.3 MMOL/L (ref 3.5–5.5)
PROT SERPL-MCNC: 7.3 G/DL (ref 6.4–8.2)
RBC # BLD AUTO: 4.01 M/UL (ref 4.35–5.65)
SODIUM SERPL-SCNC: 142 MMOL/L (ref 136–145)
TRIGL SERPL-MCNC: 45 MG/DL (ref ?–150)
VLDLC SERPL CALC-MCNC: 9 MG/DL
WBC # BLD AUTO: 3.9 K/UL (ref 4.6–13.2)

## 2021-11-24 PROCEDURE — 85027 COMPLETE CBC AUTOMATED: CPT

## 2021-11-24 PROCEDURE — 80053 COMPREHEN METABOLIC PANEL: CPT

## 2021-11-24 PROCEDURE — 80061 LIPID PANEL: CPT

## 2021-11-24 PROCEDURE — 36415 COLL VENOUS BLD VENIPUNCTURE: CPT

## 2021-12-03 ENCOUNTER — OFFICE VISIT (OUTPATIENT)
Dept: INTERNAL MEDICINE CLINIC | Age: 86
End: 2021-12-03
Payer: MEDICARE

## 2021-12-03 DIAGNOSIS — Z00.00 MEDICARE ANNUAL WELLNESS VISIT, SUBSEQUENT: Primary | ICD-10-CM

## 2021-12-03 DIAGNOSIS — Z95.818 PRESENCE OF WATCHMAN LEFT ATRIAL APPENDAGE CLOSURE DEVICE: ICD-10-CM

## 2021-12-03 DIAGNOSIS — R33.9 URINARY RETENTION: ICD-10-CM

## 2021-12-03 DIAGNOSIS — E78.5 HYPERLIPIDEMIA, UNSPECIFIED HYPERLIPIDEMIA TYPE: ICD-10-CM

## 2021-12-03 DIAGNOSIS — Z95.1 S/P CABG X 4: ICD-10-CM

## 2021-12-03 DIAGNOSIS — I25.10 ATHEROSCLEROSIS OF NATIVE CORONARY ARTERY OF NATIVE HEART WITHOUT ANGINA PECTORIS: ICD-10-CM

## 2021-12-03 DIAGNOSIS — I63.49 CEREBROVASCULAR ACCIDENT (CVA) DUE TO EMBOLISM OF OTHER CEREBRAL ARTERY (HCC): ICD-10-CM

## 2021-12-03 PROCEDURE — 1101F PT FALLS ASSESS-DOCD LE1/YR: CPT | Performed by: INTERNAL MEDICINE

## 2021-12-03 PROCEDURE — G8536 NO DOC ELDER MAL SCRN: HCPCS | Performed by: INTERNAL MEDICINE

## 2021-12-03 PROCEDURE — G8427 DOCREV CUR MEDS BY ELIG CLIN: HCPCS | Performed by: INTERNAL MEDICINE

## 2021-12-03 PROCEDURE — G8510 SCR DEP NEG, NO PLAN REQD: HCPCS | Performed by: INTERNAL MEDICINE

## 2021-12-03 PROCEDURE — 99214 OFFICE O/P EST MOD 30 MIN: CPT | Performed by: INTERNAL MEDICINE

## 2021-12-03 PROCEDURE — G0463 HOSPITAL OUTPT CLINIC VISIT: HCPCS | Performed by: INTERNAL MEDICINE

## 2021-12-03 PROCEDURE — G8420 CALC BMI NORM PARAMETERS: HCPCS | Performed by: INTERNAL MEDICINE

## 2021-12-03 PROCEDURE — G0439 PPPS, SUBSEQ VISIT: HCPCS | Performed by: INTERNAL MEDICINE

## 2021-12-03 RX ORDER — CLOPIDOGREL BISULFATE 75 MG/1
TABLET ORAL
COMMUNITY
Start: 2021-11-22

## 2021-12-03 NOTE — PROGRESS NOTES
Marycruz Crews presents with   Chief Complaint   Patient presents with    Follow-up     4 month    Cholesterol Problem    Labs     11-24-21        1. \"Have you been to the ER, urgent care clinic since your last visit? Hospitalized since your last visit? \" YES, 11-30-21 [facility]    2. \"Have you seen or consulted any other health care providers outside of the [address] [place] [address] since your last visit? \" YES- see above    3. For patients aged 39-70: Has the patient had a colonoscopy? NA based on age or sex     If the patient is female:    3. For patients aged 41-77: Has the patient had a mammogram within the past 2 years? NA based on age or sex    11. For patients aged 21-65: Has the patient had a pap smear?  NA based on age or sex

## 2021-12-05 VITALS
BODY MASS INDEX: 23.38 KG/M2 | TEMPERATURE: 97.7 F | OXYGEN SATURATION: 98 % | HEIGHT: 71 IN | HEART RATE: 68 BPM | WEIGHT: 167 LBS | DIASTOLIC BLOOD PRESSURE: 89 MMHG | SYSTOLIC BLOOD PRESSURE: 136 MMHG | RESPIRATION RATE: 16 BRPM

## 2021-12-05 PROBLEM — Z95.818 PRESENCE OF WATCHMAN LEFT ATRIAL APPENDAGE CLOSURE DEVICE: Status: ACTIVE | Noted: 2021-09-01

## 2021-12-05 PROBLEM — R33.9 URINARY RETENTION: Status: ACTIVE | Noted: 2021-09-01

## 2021-12-05 NOTE — PROGRESS NOTES
82 y.o. WHITE/NON- male who presents for evaluation. He underwent the Watchman procedure by Dr Emily Phillips, now off NOAC and on plavix. He is trying to walk 1-2 mi/d. No cp, sob, pnd, edema, palpitations. BP running in the 120-130 over 70s when he checks outside    He did develop postop urinary retention and had indwelling cath placed. Now on flomax and cysto is planned in the near future    No gi complaints    No new neurologic issues and seeing NP [place]     LAST MEDICARE WELLNESS EXAM: 7/10/15, 3/8/17, 12/28/20, 12/3/21    Past Medical History:   Diagnosis Date    Atrial fibrillation (HCC)     Chads2 2; no OAC due to h/o cryptogenic ICH    BPH     on CT 8/19    Cardiomyopathy ([facility] [address] 07/2021    Coronary artery disease     cath 2010 LM 20-30%, LAD 60-70%, Cx 100%, RCA prox 70&, mid 80%, ef 45%    GERD     H/O echocardiogram 07/2021    ef 35-40%, mild cLVH, severe dilated LV, gr 2 dd, pulm htn pap 57, severe LAE, KENNEDY, neg bubble study    Hyperlipidemia     Nontraumatic hemorrhage of right cerebral hemisphere ([facility] [address] 05/08/2018    SOH; cryptogenic    Peptic ulcer disease     Presence of Watchman left atrial appendage closure device 09/2021    Dr Dawood Vasquez 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   Tobacco Use    Smoking status: Never Smoker    Smokeless tobacco: Never Used   Substance and Sexual Activity    Alcohol use: No    Drug use: No    Sexual activity: Yes     Partners: Female     Birth control/protection: None   Other Topics Concern    Not on file   Social History Narrative    Not on file     Social Determinants of Health     Financial Resource Strain:     Difficulty of Paying Living Expenses: Not on file   Food Insecurity:     Worried About Running Out of Food in the Last Year: Not on file    Alvarez of Food in the Last Year: Not on file   Transportation Needs:     Lack of Transportation (Medical): Not on file    Lack of Transportation (Non-Medical): Not on file   Physical Activity:     Days of Exercise per Week: Not on file    Minutes of Exercise per Session: Not on file   Stress:     Feeling of Stress : Not on file   Social Connections:     Frequency of Communication with Friends and Family: Not on file    Frequency of Social Gatherings with Friends and Family: Not on file    Attends Yazidi Services: Not on file    Active Member of [address] [place] [address] or Organizations: Not on file    Attends Club or Organization Meetings: Not on file    Marital Status: Not on file   Intimate Partner Violence:     Fear of Current or Ex-Partner: Not on file    Emotionally Abused: Not on file    Physically Abused: Not on file    Sexually Abused: Not on file   Housing Stability:     Unable to Pay for Housing in the Last Year: Not on file    Number of [place] in the Last Year: Not on file    Unstable Housing in the Last Year: Not on file     Current Outpatient Medications   Medication Sig    clopidogreL (PLAVIX) 75 mg tab     furosemide (LASIX) 20 mg tablet TAKE ONE TABLET BY MOUTH EVERY MORNING    rosuvastatin (CRESTOR) 40 mg tablet Take 1 Tablet by mouth daily.     aspirin 81 mg chewable tablet Take 81 mg by mouth daily.  tamsulosin (FLOMAX) 0.4 mg capsule Take 1 Capsule by mouth daily (after dinner).  famotidine (PEPCID) 20 mg tablet Take 1 Tablet by mouth daily.  CHOLECALCIFEROL, VITAMIN D3, PO Take 5,000 Units by mouth.  VITAMIN B COMPLEX PO Take  by mouth.  MULTIVITAMIN PO Take  by mouth daily. No current facility-administered medications for this visit. Allergies   Allergen Reactions    Atorvastatin Myalgia and Other (comments)     Myalgia    Pcn [Penicillins] Other (comments)     Diaphoretic, elevates blood pressure, insomnia    Pravastatin Myalgia and Other (comments)     Myalgia    Amoxicillin Other (comments)     Felt shaky and nervous    Rosuvastatin Other (comments)     Can only take name brand CRESTOR     REVIEW OF SYSTEMS: colo 2013-14 Dr An Gleason? Ophtho  no vision change or eye pain  Oral  no mouth pain, tongue or tooth problems  Ears  no hearing loss, ear pain, fullness, no swallowing problems  Cardiac  no CP, PND, orthopnea, edema, palpitations or syncope  Chest  no breast masses  Resp  no wheezing, chronic coughing, dyspnea  GI  no heartburn, nausea, vomiting, change in bowel habits, bleeding, hemorrhoids  Urinary  no dysuria, hematuria, flank pain, urgency, frequency    Visit Vitals  /89   Pulse 68   Temp 97.7 °F (36.5 °C) (Temporal)   Resp 16   Ht 5' 11\" (1.803 m)   Wt 167 lb (75.8 kg)   SpO2 98%   BMI 23.29 kg/m²     A&O x3  Affect is appropriate. Mood stable  No apparent distress  Anicteric, no JVD, adenopathy or thyromegaly. No carotid bruits or radiated murmur  Lungs clear to auscultation, no wheezes or rales  Heart showed irregular rhythm. No murmur, rubs, gallops  Abdomen soft nontender, no hepatosplenomegaly or masses. Extremities without edema.   Pulses 1-2+ symmetrically    LABS  From 3/12 showed gluc 89, cr 0.87, gfr>60, alt 18,       chol 125, tg 72,   hdl 43, ldl-c 68,   wbc 3.3, hb 13.5, plt 149  From 6/13 showed                     chol 123, tg 75,   hdl 42, ldl-c 66,                     tsh 3.28  From 7/15 showed gluc 92, cr 0.90, gfr>60, alt<5,       chol 189, tg 117, hdl 43, ldl-c 123, wbc 4.2, hb 13.6, plt 152, tsh 2.75, ua neg  From 3/17 showed gluc 88, cr 0.96, gfr>60, alt 28,       chol 212, tg 105, hdl 46, ldl-c 145, wbc 4.1, hb 13.6, plt 172, tsh 2.57  From 8/18 showed gluc 90, cr 0.89, gfr>60, alt 27,       chol 189, tg 84,   hdl 55, ldl-c 117, wbc 3.9, hb 13.3, plt 141,  From 6/20 showed gluc 76, cr 0.91, gfr>60, alt 35,       chol 151, tg 62,   hdl 57, ldl-c 82  From 7/21 showed gluc 88, cr 0.73, gfr>60            hba1c 5.9, chol 151, tg 86,   hdl 48, ldl-c 86,   wbc 4.1, hb 12.2, plt 13.2, tsh 3.98    Results for orders placed or performed during the hospital encounter of 11/24/21   LIPID PANEL   Result Value Ref Range    LIPID PROFILE          Cholesterol, total 126 <200 MG/DL    Triglyceride 45 <150 MG/DL    HDL Cholesterol 58 40 - 60 MG/DL    LDL, calculated 59 0 - 100 MG/DL    VLDL, calculated 9 MG/DL    CHOL/HDL Ratio 2.2 0 - 5.0     METABOLIC PANEL, COMPREHENSIVE   Result Value Ref Range    Sodium 142 136 - 145 mmol/L    Potassium 4.3 3.5 - 5.5 mmol/L    Chloride 108 100 - 111 mmol/L    CO2 30 21 - 32 mmol/L    Anion gap 4 3.0 - 18 mmol/L    Glucose 79 74 - 99 mg/dL    BUN 26 (H) 7.0 - 18 MG/DL    Creatinine 0.93 0.6 - 1.3 MG/DL    BUN/Creatinine ratio 28 (H) 12 - 20      GFR est AA >60 >60 ml/min/1.73m2    GFR est non-AA >60 >60 ml/min/1.73m2    Calcium 9.9 8.5 - 10.1 MG/DL    Bilirubin, total 0.3 0.2 - 1.0 MG/DL    ALT (SGPT) 29 16 - 61 U/L    AST (SGOT) 23 10 - 38 U/L    Alk.  phosphatase 121 (H) 45 - 117 U/L    Protein, total 7.3 6.4 - 8.2 g/dL    Albumin 3.8 3.4 - 5.0 g/dL    Globulin 3.5 2.0 - 4.0 g/dL    A-G Ratio 1.1 0.8 - 1.7     CBC W/O DIFF   Result Value Ref Range    WBC 3.9 (L) 4.6 - 13.2 K/uL    RBC 4.01 (L) 4.35 - 5.65 M/uL    HGB 12.4 (L) 13.0 - 16.0 g/dL    HCT 38.7 36.0 - 48.0 % MCV 96.5 78.0 - 100.0 FL    MCH 30.9 24.0 - 34.0 PG    MCHC 32.0 31.0 - 37.0 g/dL    RDW 13.9 11.6 - 14.5 %    PLATELET 364 158 - 199 K/uL    MPV 10.4 9.2 - 11.8 FL    NRBC 0.0 0  WBC    ABSOLUTE NRBC 0.00 0.00 - 0.01 K/uL     We reviewed the patient's labs from the last several visits to point out trends in the numbers        Patient Active Problem List   Diagnosis Code    GERD (gastroesophageal reflux disease) K21.9    Coronary artery disease I25.10    Advance directive discussed with patient Z71.89    Hyperlipidemia E78.5    History of intracranial hemorrhage Z86.79    S/P CABG x 4 Z95.1    CVA (cerebral vascular accident) ([address] I63.9    Paroxysmal atrial fibrillation ([address] I48.0    Cardiomyopathy ([facility] [address] I42.9    Presence of Watchman left atrial appendage closure device Z95.818    Urinary retention R33.9     Assessment and plan:  1. GERD. Avoidance measures  2. CHD. Continue current regimen and f/u Dr Leana Patel  3. HLP. At target on crestor  4. S/P watchman procedure. Off NOAC, follow up Dr Leana Patel  5. CVA. plavix and follow up neuro  6. Urinary retention w indwelling catheter. Follow up Jocelyn Castillo as scheduled        RTC 6/22    Above conditions discussed at length and patient vocalized understanding. All questions answered to patient satisfaction          ICD-10-CM ICD-9-CM    1. 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 Atherosclerosis of native coronary artery of native heart without angina pectoris  I25.10 414.01    3. S/P CABG x 4  Z95.1 V45.81    4. Presence of Watchman left atrial appendage closure device  Z95.818 V45.09    5. Hyperlipidemia, unspecified hyperlipidemia type  E78.5 272.4    6.  Urinary retention  R33.9 788.20

## 2021-12-06 ENCOUNTER — TELEPHONE (OUTPATIENT)
Dept: INTERNAL MEDICINE CLINIC | Age: 86
End: 2021-12-06

## 2021-12-06 NOTE — TELEPHONE ENCOUNTER
Pt states he was seen at [facility] ER yesterday for UTI. They changed out his cath tube with a new one and prescribed antibiotics which he is  going to  today.

## 2021-12-06 NOTE — PROGRESS NOTES
This is the Subsequent Medicare Annual Wellness Exam    I have reviewed the patient's medical history in detail and updated the computerized patient record. Assessment/Plan   Education and counseling provided:  Are appropriate based on today's review and evaluation  Influenza Vaccine  Cardiovascular screening blood test  Diabetes screening test    1. 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 Atherosclerosis of native coronary artery of native heart without angina pectoris  3. S/P CABG x 4  4. Presence of Watchman left atrial appendage closure device  5. Hyperlipidemia, unspecified hyperlipidemia type  6. Urinary retention  7. Medicare annual wellness visit, subsequent       Depression Risk Factor Screening     3 most recent PHQ Screens 12/3/2021   Little interest or pleasure in doing things Not at all   Feeling down, depressed, irritable, or hopeless Not at all   Total Score PHQ 2 0       Alcohol Risk Screen    Do you average more than 1 drink per night or more than 7 drinks a week: No    In the past three months have you have had more than 4 drinks containing alcohol on one occasion: No        Functional Ability and Level of Safety    Hearing: Hearing is good. Activities of Daily Living: The home contains: no safety equipment. Patient does total self care      Ambulation: with no difficulty     Fall Risk:  Fall Risk Assessment, last 12 mths 12/3/2021   Able to walk? Yes   Fall in past 12 months? 0   Do you feel unsteady?  0   Are you worried about falling 0      Abuse Screen:  Patient is not abused       Cognitive Screening    Has your family/caregiver stated any concerns about your memory: no     Cognitive Screening: Normal - Mini Cog Test    Health Maintenance Due     Health Maintenance Due   Topic Date Due    DTaP/Tdap/Td series (1 - Tdap) Never done    COVID-19 Vaccine (3 - Booster for Pfizer series) 08/27/2021    Flu Vaccine (1) 09/01/2021       Patient Care Team   Patient Care Team:  Huong Garza MD as PCP - General (Internal Medicine)  Huong Garza MD as PCP - [facility]  Tashi Mojica MD (Cardiology)  Yudy Armando MD (Ophthalmology)    History     Patient Active Problem List   Diagnosis Code    GERD (gastroesophageal reflux disease) K21.9    Coronary artery disease I25.10    Advance directive discussed with patient Z71.89    Hyperlipidemia E78.5    History of intracranial hemorrhage Z86.79    S/P CABG x 4 Z95.1    CVA (cerebral vascular accident) ([address] I63.9    Paroxysmal atrial fibrillation ([address] I48.0    Cardiomyopathy ([address] I42.9    Presence of Watchman left atrial appendage closure device Z95.818    Urinary retention R33.9     Past Medical History:   Diagnosis Date    Atrial fibrillation ([address]     Chads2 2; no OAC due to h/o cryptogenic ICH    BPH     on CT 8/19    Cardiomyopathy ([address] 07/2021    Coronary artery disease     cath 2010 LM 20-30%, LAD 60-70%, Cx 100%, RCA prox 70&, mid 80%, ef 45%    GERD     H/O echocardiogram 07/2021    ef 35-40%, mild cLVH, severe dilated LV, gr 2 dd, pulm htn pap 57, severe LAE, KENNEDY, neg bubble study    Hyperlipidemia     Nontraumatic hemorrhage of right cerebral hemisphere ([address] 05/08/2018    [facility]; cryptogenic    Peptic ulcer disease     Presence of Watchman left atrial appendage closure device 09/2021    Dr Merlin All 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     Dr Brown Lights  09/2010    LIMA - LAD/D1,  SVG - OM,  SVG - PDA    HX HERNIA REPAIR      [facility] 1972Wyvonne Wilmer 7/21     Current Outpatient Medications   Medication Sig Dispense Refill    clopidogreL (PLAVIX) 75 mg tab       furosemide (LASIX) 20 mg tablet TAKE ONE TABLET BY MOUTH EVERY MORNING 30 Tablet 2    rosuvastatin (CRESTOR) 40 mg tablet Take 1 Tablet by mouth daily. 90 Tablet 1    aspirin 81 mg chewable tablet Take 81 mg by mouth daily.  tamsulosin (FLOMAX) 0.4 mg capsule Take 1 Capsule by mouth daily (after dinner). 90 Capsule 3    famotidine (PEPCID) 20 mg tablet Take 1 Tablet by mouth daily. 30 Tablet 2    CHOLECALCIFEROL, VITAMIN D3, PO Take 5,000 Units by mouth.  VITAMIN B COMPLEX PO Take  by mouth.  MULTIVITAMIN PO Take  by mouth daily.        Allergies   Allergen Reactions    Atorvastatin Myalgia and Other (comments)     Myalgia    Pcn [Penicillins] Other (comments)     Diaphoretic, elevates blood pressure, insomnia    Pravastatin Myalgia and Other (comments)     Myalgia    Amoxicillin Other (comments)     Felt shaky and nervous    Rosuvastatin Other (comments)     Can only take name brand CRESTOR       Family History   Problem Relation Age of Onset    Cancer Mother         liver     Social History     Tobacco Use    Smoking status: Never Smoker    Smokeless tobacco: Never Used   Substance Use Topics    Alcohol use: No         Chantale Castillo MD

## 2021-12-06 NOTE — PATIENT INSTRUCTIONS
Medicare Wellness Visit, Male    The best way to live healthy is to have a lifestyle where you eat a well-balanced diet, exercise regularly, limit alcohol use, and quit all forms of tobacco/nicotine, if applicable. Regular preventive services are another way to keep healthy. Preventive services (vaccines, screening tests, monitoring & exams) can help personalize your care plan, which helps you manage your own care. Screening tests can find health problems at the earliest stages, when they are easiest to treat. [facility] follows the current, evidence-based guidelines published by the [employer] Den[employer] ([facility]) when recommending preventive services for our patients. Because we follow these guidelines, sometimes recommendations change over time as research supports it. (For example, a prostate screening blood test is no longer routinely recommended for men with no symptoms). Of course, you and your doctor may decide to screen more often for some diseases, based on your risk and co-morbidities (chronic disease you are already diagnosed with). Preventive services for you include:  - Medicare offers their members a free annual wellness visit, which is time for you and your primary care provider to discuss and plan for your preventive service needs. Take advantage of this benefit every year!  -All adults over age 72 should receive the recommended pneumonia vaccines. Current USPSTF guidelines recommend a series of two vaccines for the best pneumonia protection.   -All adults should have a flu vaccine yearly and tetanus vaccine every 10 years.  -All adults age 48 and older should receive the shingles vaccines (series of two vaccines).        -All adults age 38-68 who are overweight should have a diabetes screening test once every three years.   -Other screening tests & preventive services for persons with diabetes include: an eye exam to screen for diabetic retinopathy, a kidney function test, a foot exam, and stricter control over your cholesterol.   -Cardiovascular screening for adults with routine risk involves an electrocardiogram (ECG) at intervals determined by the provider.   -Colorectal cancer screening should be done for adults age 54-65 with no increased risk factors for colorectal cancer. There are a number of acceptable methods of screening for this type of cancer. Each test has its own benefits and drawbacks. Discuss with your provider what is most appropriate for you during your annual wellness visit. The different tests include: colonoscopy (considered the best screening method), a fecal occult blood test, a fecal DNA test, and sigmoidoscopy.  -All adults born between [facility] should be screened once for Hepatitis C.  -An Abdominal Aortic Aneurysm (AAA) Screening is recommended for men age 73-68 who has ever smoked in their lifetime.      Here is a list of your current Health Maintenance items (your personalized list of preventive services) with a due date:  Health Maintenance Due   Topic Date Due    DTaP/Tdap/Td  (1 - Tdap) Never done    COVID-19 Vaccine (3 - Booster for [employer] series) 08/27/2021    Yearly Flu Vaccine (1) 09/01/2021

## 2022-01-01 NOTE — PROGRESS NOTES
Chrystal Banegas 1935, is a [de-identified] y.o. male, who is seen today for reevaluation of atherosclerotic heart disease hyperlipidemia hypertension. 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 No chest pain or dyspnea. He takes all of his medicine regularly. Past Medical History:   Diagnosis Date    BPH     CABG     9/10   LIMA - LAD/D1,  SVG - OM,  SVG - PDA    Coronary artery disease     Dyslipidemia     GERD     Peptic ulcer disease      Current Outpatient Prescriptions   Medication Sig Dispense Refill    rosuvastatin (CRESTOR) 20 mg tablet Take 1 Tab by mouth nightly. 90 Tab 3    aspirin 81 mg tablet Take 81 mg by mouth.  MULTIVITAMIN PO Take  by mouth. Visit Vitals    /68    Pulse 63    Temp 98 °F (36.7 °C) (Oral)    Resp 12    Ht 5' 10\" (1.778 m)    Wt 189 lb (85.7 kg)    SpO2 96%    BMI 27.12 kg/m2     Carotids are 2+ without bruits. Lungs are clear to percussion. Good breath sounds with no wheezing or crackles. Heart reveals a regular rhythm with normal S1 and S2 no murmur gallop click or rub. Apical impulse is not palpable. Abdomen is soft and nontender with no hepatosplenomegaly or masses and no bruits. Extremities reveal no clubbing cyanosis or edema. Pulses are 2+. He did not have lab done as ordered. Assessment: #1. Atherosclerotic heart disease doing well since bypass surgery in 2010. He will continue aspirin 81 mg daily. #2. Hyperlipidemia has been doing well, he will continue rosuvastatin 20 mg each evening we will check lipids in 6 months. #3. Hypertension is now controlled. His blood pressure when he first came into the office was 154 today but is resting reading in the right arm is normal as above. He will continue no added salt diet. Follow-up in 6 months for complete evaluation or sooner if needed    Rodolfo Ziegler MD FACP    Please note:   This document has been produced using voice recognition software. Unrecognized errors in transcription may be present. [employer]

## 2022-02-03 ENCOUNTER — TELEPHONE (OUTPATIENT)
Dept: INTERNAL MEDICINE CLINIC | Age: 87
End: 2022-02-03

## 2022-02-03 NOTE — TELEPHONE ENCOUNTER
Pt states he has had a bad reaction to Crestor 40 mg. Stated Dr Leatha Mondragon increased the dose from 20 mg to 40 mg recently. Stated from 01/24/22 to 01/30/22 he started feeling bad, hurting between his shoulders. At first he thought it was a crick in his neck. Stated it got so bad he couldn't use his hand    After speaking with the pharmacist he stopped taking Crestor last Sunday and the symptoms have almost gone away.  Stated just \"a little soreness at the back of neck\"    Stated he is also on two new medications from urology,   dutasteride (AVODART) 0.5 mg capsule and silodosin (RAPAFLO) 8 mg capsule     He feels that is was the Crestor 40 mg, stated he has had difficulty with generic Crestor in the past, but no problem with his prior dose of 20 mg

## 2022-02-03 NOTE — TELEPHONE ENCOUNTER
Give it 1 or 2 more weeks off the med then restart at crestor 20 (cut the 40s in half or we can call in the 20s)

## 2022-02-20 RX ORDER — FUROSEMIDE 20 MG/1
TABLET ORAL
Qty: 30 TABLET | Refills: 2 | Status: SHIPPED | OUTPATIENT
Start: 2022-02-20 | End: 2022-05-19

## 2022-03-18 PROBLEM — Z95.1 S/P CABG X 4: Status: ACTIVE | Noted: 2020-12-24

## 2022-03-18 PROBLEM — E78.5 HYPERLIPIDEMIA: Status: ACTIVE | Noted: 2017-03-08

## 2022-03-18 PROBLEM — Z95.818 PRESENCE OF WATCHMAN LEFT ATRIAL APPENDAGE CLOSURE DEVICE: Status: ACTIVE | Noted: 2021-09-01

## 2022-03-19 PROBLEM — R33.9 URINARY RETENTION: Status: ACTIVE | Noted: 2021-09-01

## 2022-03-19 PROBLEM — I48.0 PAROXYSMAL ATRIAL FIBRILLATION (HCC): Status: ACTIVE | Noted: 2021-07-25

## 2022-03-19 PROBLEM — Z86.79 HISTORY OF INTRACRANIAL HEMORRHAGE: Status: ACTIVE | Noted: 2020-06-23

## 2022-03-19 PROBLEM — I63.9 CVA (CEREBRAL VASCULAR ACCIDENT) (HCC): Status: ACTIVE | Noted: 2021-07-25

## 2022-03-20 PROBLEM — Z71.89 ADVANCE DIRECTIVE DISCUSSED WITH PATIENT: Status: ACTIVE | Noted: 2017-03-08

## 2022-03-20 PROBLEM — I42.9 CARDIOMYOPATHY (HCC): Status: ACTIVE | Noted: 2021-07-01

## 2022-05-19 RX ORDER — FUROSEMIDE 20 MG/1
TABLET ORAL
Qty: 90 TABLET | Refills: 3 | Status: SHIPPED | OUTPATIENT
Start: 2022-05-19

## 2022-06-02 ENCOUNTER — LAB ONLY (OUTPATIENT)
Dept: INTERNAL MEDICINE CLINIC | Age: 87
End: 2022-06-02

## 2022-06-02 ENCOUNTER — HOSPITAL ENCOUNTER (OUTPATIENT)
Dept: LAB | Age: 87
Discharge: HOME | End: 2022-06-02
Payer: MEDICARE

## 2022-06-02 DIAGNOSIS — Z00.00 ENCOUNTER FOR ANNUAL PHYSICAL EXAM: ICD-10-CM

## 2022-06-02 DIAGNOSIS — E78.5 HYPERLIPIDEMIA, UNSPECIFIED HYPERLIPIDEMIA TYPE: ICD-10-CM

## 2022-06-02 DIAGNOSIS — Z00.00 ENCOUNTER FOR ANNUAL PHYSICAL EXAM: Primary | ICD-10-CM

## 2022-06-02 LAB
ALBUMIN SERPL-MCNC: 3.9 G/DL (ref 3.4–5)
ALBUMIN/GLOB SERPL: 1.3 {RATIO} (ref 0.8–1.7)
ALP SERPL-CCNC: 102 U/L (ref 45–117)
ALT SERPL-CCNC: 24 U/L (ref 16–61)
ANION GAP SERPL CALC-SCNC: 5 MMOL/L (ref 3–18)
AST SERPL-CCNC: 17 U/L (ref 10–38)
BILIRUB SERPL-MCNC: 0.5 MG/DL (ref 0.2–1)
BUN SERPL-MCNC: 19 MG/DL (ref 7–18)
BUN/CREAT SERPL: 21 (ref 12–20)
CALCIUM SERPL-MCNC: 9.8 MG/DL (ref 8.5–10.1)
CHLORIDE SERPL-SCNC: 113 MMOL/L (ref 100–111)
CHOLEST SERPL-MCNC: 127 MG/DL
CO2 SERPL-SCNC: 25 MMOL/L (ref 21–32)
CREAT SERPL-MCNC: 0.91 MG/DL (ref 0.6–1.3)
ERYTHROCYTE [DISTWIDTH] IN BLOOD BY AUTOMATED COUNT: 14 % (ref 11.6–14.5)
GLOBULIN SER CALC-MCNC: 3 G/DL (ref 2–4)
GLUCOSE SERPL-MCNC: 102 MG/DL (ref 74–99)
HCT VFR BLD AUTO: 38.6 % (ref 36–48)
HDLC SERPL-MCNC: 56 MG/DL (ref 40–60)
HDLC SERPL: 2.3 {RATIO} (ref 0–5)
HGB BLD-MCNC: 12.6 G/DL (ref 13–16)
LDLC SERPL CALC-MCNC: 52.4 MG/DL (ref 0–100)
LIPID PROFILE,FLP: NORMAL
MCH RBC QN AUTO: 31.9 PG (ref 24–34)
MCHC RBC AUTO-ENTMCNC: 32.6 G/DL (ref 31–37)
MCV RBC AUTO: 97.7 FL (ref 78–100)
NRBC # BLD: 0 K/UL (ref 0–0.01)
NRBC BLD-RTO: 0 PER 100 WBC
PLATELET # BLD AUTO: 130 K/UL (ref 135–420)
PMV BLD AUTO: 10.8 FL (ref 9.2–11.8)
POTASSIUM SERPL-SCNC: 3.8 MMOL/L (ref 3.5–5.5)
PROT SERPL-MCNC: 6.9 G/DL (ref 6.4–8.2)
RBC # BLD AUTO: 3.95 M/UL (ref 4.35–5.65)
SODIUM SERPL-SCNC: 143 MMOL/L (ref 136–145)
TRIGL SERPL-MCNC: 93 MG/DL (ref ?–150)
VLDLC SERPL CALC-MCNC: 18.6 MG/DL
WBC # BLD AUTO: 4.5 K/UL (ref 4.6–13.2)

## 2022-06-02 PROCEDURE — 80053 COMPREHEN METABOLIC PANEL: CPT

## 2022-06-02 PROCEDURE — 85027 COMPLETE CBC AUTOMATED: CPT

## 2022-06-02 PROCEDURE — 36415 COLL VENOUS BLD VENIPUNCTURE: CPT

## 2022-06-02 PROCEDURE — 80061 LIPID PANEL: CPT

## 2022-06-06 NOTE — PROGRESS NOTES
82 y.o. WHITE/NON- male who presents for evaluation. He underwent the Watchman procedure by Dr Ana Vail, now off NOAC and on plavix. He is walking his little dog 1-2 mi/d and he tends a rather large garden growing squasg, cukes, peppers, eggplant, etc. . No cp, sob, pnd, edema, palpitations. BP running in the 120-130 over 70s when he checks outside. Had follow up w Dr Olga Cooper last week     Continues to have urinary retention and self cathing 3x/d. Now on flomax but cardiology has njot cleared him for surgery for there BPH    No gi complaints    No new neurologic issues and seeing NP Addison     LAST MEDICARE WELLNESS EXAM: 7/10/15, 3/8/17, 12/28/20, 12/3/21    Past Medical History:   Diagnosis Date    Atrial fibrillation (HCC)     Chads2 2; no OAC due to h/o cryptogenic ICH    BPH with obstruction/lower urinary tract symptoms     on CT 8/19; urinary retention 2021; Dr Katelin Stevens [facility]) 07/2021    Coronary artery disease     cath 2010 LM 20-30%, LAD 60-70%, Cx 100%, RCA prox 70&, mid 80%, ef 45%    GERD     H/O echocardiogram 07/2021    ef 35-40%, mild cLVH, severe dilated LV, gr 2 dd, pulm htn pap 37, severe LAE, KENNEDY, neg bubble study    Hyperlipidemia     IGT (impaired glucose tolerance) 07/2021    Nontraumatic hemorrhage of right cerebral hemisphere ([facility] [address] 05/08/2018    SOH; cryptogenic    Peptic ulcer disease     Presence of Watchman left atrial appendage closure device 09/2021    Dr Rosevelt Ormond 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   Tobacco Use    Smoking status: Never Smoker    Smokeless tobacco: Never Used   Substance and Sexual Activity    Alcohol use: No    Drug use: No    Sexual activity: Yes     Partners: Female     Birth control/protection: None   Other Topics Concern    Not on file   Social History Narrative    Not on file     Social Determinants of Health     Financial Resource Strain:     Difficulty of Paying Living Expenses: Not on file   Food Insecurity:     Worried About Running Out of Food in the Last Year: Not on file    Alvarez of Food in the Last Year: Not on file   Transportation Needs:     Lack of Transportation (Medical): Not on file    Lack of Transportation (Non-Medical):  Not on file   Physical Activity:     Days of Exercise per Week: Not on file    Minutes of Exercise per Session: Not on file   Stress:     Feeling of Stress : Not on file   Social Connections:     Frequency of Communication with Friends and Family: Not on file    Frequency of Social Gatherings with Friends and Family: Not on file    Attends Jainism Services: Not on file    Active Member of Clubs or Organizations: Not on file    Attends Club or Organization Meetings: Not on file    Marital Status: Not on file   Intimate Partner Violence:     Fear of Current or Ex-Partner: Not on file    Emotionally Abused: Not on file    Physically Abused: Not on file    Sexually Abused: Not on file   Housing Stability:     Unable to Pay for Housing in the Last Year: Not on file    Number of [place] in the Last Year: Not on file    Unstable Housing in the Last Year: Not on file     Current Outpatient Medications   Medication Sig    furosemide (LASIX) 20 mg tablet TAKE ONE TABLET BY MOUTH EVERY MORNING    famotidine (PEPCID) 20 mg tablet     cephALEXin (Keflex) 500 mg capsule Take 500 mg by mouth four (4) times daily.  dutasteride (AVODART) 0.5 mg capsule Take 1 Capsule by mouth daily (after dinner).  silodosin (RAPAFLO) 8 mg capsule Take 1 Capsule by mouth daily (with dinner).  clopidogreL (PLAVIX) 75 mg tab     rosuvastatin (CRESTOR) 40 mg tablet Take 1 Tablet by mouth daily.  aspirin 81 mg chewable tablet Take 81 mg by mouth daily.  CHOLECALCIFEROL, VITAMIN D3, PO Take 5,000 Units by mouth.  VITAMIN B COMPLEX PO Take  by mouth.  MULTIVITAMIN PO Take  by mouth daily. No current facility-administered medications for this visit. Allergies   Allergen Reactions    Atorvastatin Myalgia and Other (comments)     Myalgia    Pcn [Penicillins] Other (comments)     Diaphoretic, elevates blood pressure, insomnia    Pravastatin Myalgia and Other (comments)     Myalgia    Amoxicillin Other (comments)     Felt shaky and nervous    Rosuvastatin Other (comments)     Can only take name brand CRESTOR     REVIEW OF SYSTEMS: colo 2013-14 Dr Kyree Calhoun? Ophtho - no vision change or eye pain  Oral - no mouth pain, tongue or tooth problems  Ears - no hearing loss, ear pain, fullness, no swallowing problems  Cardiac - no CP, PND, orthopnea, edema, palpitations or syncope  Chest - no breast masses  Resp - no wheezing, chronic coughing, dyspnea  GI - no heartburn, nausea, vomiting, change in bowel habits, bleeding, hemorrhoids  Urinary - no dysuria, hematuria, flank pain, urgency, frequency    Visit Vitals  /70   Pulse 60   Temp 97 °F (36.1 °C) (Temporal)   Resp 16   Ht 5' 11\" (1.803 m)   Wt 162 lb (73.5 kg)   SpO2 97%   BMI 22.59 kg/m²     A&O x3  Affect is appropriate.   Mood stable  No apparent distress  Anicteric, no JVD, adenopathy or thyromegaly. No carotid bruits or radiated murmur  Lungs clear to auscultation, no wheezes or rales  Heart showed irregular rhythm. No murmur, rubs, gallops  Abdomen soft nontender, no hepatosplenomegaly or masses. Extremities with 1+ ankle edema. Pulses 1-2+ symmetrically    LABS  From 3/12 showed gluc 89, cr 0.87, gfr>60, alt 18,       chol 125, tg 72,   hdl 43, ldl-c 68,   wbc 3.3, hb 13.5, plt 149  From 6/13 showed                     chol 123, tg 75,   hdl 42, ldl-c 66,                     tsh 3.28  From 7/15 showed gluc 92, cr 0.90, gfr>60, alt<5,       chol 189, tg 117, hdl 43, ldl-c 123, wbc 4.2, hb 13.6, plt 152, tsh 2.75, ua neg  From 3/17 showed gluc 88, cr 0.96, gfr>60, alt 28,       chol 212, tg 105, hdl 46, ldl-c 145, wbc 4.1, hb 13.6, plt 172, tsh 2.57  From 8/18 showed gluc 90, cr 0.89, gfr>60, alt 27,       chol 189, tg 84,   hdl 55, ldl-c 117, wbc 3.9, hb 13.3, plt 141,  From 6/20 showed gluc 76, cr 0.91, gfr>60, alt 35,       chol 151, tg 62,   hdl 57, ldl-c 82  From 7/21 showed gluc 88, cr 0.73, gfr>60            hba1c 5.9, chol 151, tg 86,   hdl 48, ldl-c 86,   wbc 4.1, hb 12.2, plt 13.2, tsh 3.98  From 12/21 showed glu 79, cr 0.93, gfr>60, alt 29,       chol 126, tg 45,   hdl 58, ldl-c 59,   wbc 3.9, hb 12.4, plt 144    Results for orders placed or performed during the hospital encounter of 26/56/21   METABOLIC PANEL, COMPREHENSIVE   Result Value Ref Range    Sodium 143 136 - 145 mmol/L    Potassium 3.8 3.5 - 5.5 mmol/L    Chloride 113 (H) 100 - 111 mmol/L    CO2 25 21 - 32 mmol/L    Anion gap 5 3.0 - 18 mmol/L    Glucose 102 (H) 74 - 99 mg/dL    BUN 19 (H) 7.0 - 18 MG/DL    Creatinine 0.91 0.6 - 1.3 MG/DL    BUN/Creatinine ratio 21 (H) 12 - 20      GFR est AA >60 >60 ml/min/1.73m2    GFR est non-AA >60 >60 ml/min/1.73m2    Calcium 9.8 8.5 - 10.1 MG/DL    Bilirubin, total 0.5 0.2 - 1.0 MG/DL    ALT (SGPT) 24 16 - 61 U/L    AST (SGOT) 17 10 - 38 U/L    Alk.  phosphatase 102 45 - 117 U/L    Protein, total 6.9 6.4 - 8.2 g/dL    Albumin 3.9 3.4 - 5.0 g/dL    Globulin 3.0 2.0 - 4.0 g/dL    A-G Ratio 1.3 0.8 - 1.7     LIPID PANEL   Result Value Ref Range    LIPID PROFILE          Cholesterol, total 127 <200 MG/DL    Triglyceride 93 <150 MG/DL    HDL Cholesterol 56 40 - 60 MG/DL    LDL, calculated 52.4 0 - 100 MG/DL    VLDL, calculated 18.6 MG/DL    CHOL/HDL Ratio 2.3 0 - 5.0     CBC W/O DIFF   Result Value Ref Range    WBC 4.5 (L) 4.6 - 13.2 K/uL    RBC 3.95 (L) 4.35 - 5.65 M/uL    HGB 12.6 (L) 13.0 - 16.0 g/dL    HCT 38.6 36.0 - 48.0 %    MCV 97.7 78.0 - 100.0 FL    MCH 31.9 24.0 - 34.0 PG    MCHC 32.6 31.0 - 37.0 g/dL    RDW 14.0 11.6 - 14.5 %    PLATELET 185 (L) 406 - 420 K/uL    MPV 10.8 9.2 - 11.8 FL    NRBC 0.0 0  WBC    ABSOLUTE NRBC 0.00 0.00 - 0.01 K/uL     We reviewed the patient's labs from the last several visits to point out trends in the numbers        Patient Active Problem List   Diagnosis Code    GERD (gastroesophageal reflux disease) K21.9    Coronary artery disease I25.10    Advance directive discussed with patient Z71.89    Hyperlipidemia E78.5    History of intracranial hemorrhage Z86.79    S/P CABG x 4 Z95.1    CVA (cerebral vascular accident) ([facility] [address] I63.9    Paroxysmal atrial fibrillation (HCC) I48.0    Presence of Watchman left atrial appendage closure device Z95.818    Urinary retention R33.9    IGT (impaired glucose tolerance) R73.02     Assessment and plan:  1. GERD. Avoidance measures  2. CHD. Continue current regimen and f/u Dr Boubacar Wilson  3. HLP. At target on crestor  4. S/P watchman procedure. Off NOAC, follow up Dr Boubacar Wilson  5. CVA. plavix and follow up neuro  6. BPH and urinary retention. Per Dr Loulou Bergman  7. Pancytopenia. Long discussion, check labs and may need to send to [facility]        RTC 12/22    Above conditions discussed at length and patient vocalized understanding.   All questions answered to patient satisfaction ICD-10-CM ICD-9-CM    1. Pancytopenia ([facility] [address]  D61.818 284.19 FERRITIN      IRON PROFILE      PROTEIN ELECTROPHORESIS      RETICULOCYTE COUNT      VITAMIN B12 & FOLATE      CBC W/O DIFF   2. B12 deficiency  E53.8 266.2 FERRITIN      IRON PROFILE      PROTEIN ELECTROPHORESIS      RETICULOCYTE COUNT      VITAMIN B12 & FOLATE   3. Anemia, unspecified type  D64.9 285.9 FERRITIN      IRON PROFILE      PROTEIN ELECTROPHORESIS      RETICULOCYTE COUNT      VITAMIN B12 & FOLATE   4. Paroxysmal atrial fibrillation (HCC)  I48.0 427.31    5. 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 Urinary retention  R33.9 788.20    7. S/P CABG x 4  Z95.1 V45.81    8. Presence of Watchman left atrial appendage closure device  Z95.818 V45.09    9.  IGT (impaired glucose tolerance)  C15.77 616.74 METABOLIC PANEL, BASIC      HEMOGLOBIN A1C WITH EAG

## 2022-06-10 ENCOUNTER — HOSPITAL ENCOUNTER (OUTPATIENT)
Dept: LAB | Age: 87
Discharge: HOME | End: 2022-06-10
Payer: MEDICARE

## 2022-06-10 ENCOUNTER — OFFICE VISIT (OUTPATIENT)
Dept: INTERNAL MEDICINE CLINIC | Age: 87
End: 2022-06-10
Payer: MEDICARE

## 2022-06-10 VITALS
HEART RATE: 60 BPM | WEIGHT: 162 LBS | BODY MASS INDEX: 22.68 KG/M2 | SYSTOLIC BLOOD PRESSURE: 134 MMHG | HEIGHT: 71 IN | OXYGEN SATURATION: 97 % | RESPIRATION RATE: 16 BRPM | TEMPERATURE: 97 F | DIASTOLIC BLOOD PRESSURE: 70 MMHG

## 2022-06-10 DIAGNOSIS — I63.49 CEREBROVASCULAR ACCIDENT (CVA) DUE TO EMBOLISM OF OTHER CEREBRAL ARTERY (HCC): ICD-10-CM

## 2022-06-10 DIAGNOSIS — D64.9 ANEMIA, UNSPECIFIED TYPE: ICD-10-CM

## 2022-06-10 DIAGNOSIS — I48.0 PAROXYSMAL ATRIAL FIBRILLATION (HCC): ICD-10-CM

## 2022-06-10 DIAGNOSIS — D61.818 PANCYTOPENIA (HCC): ICD-10-CM

## 2022-06-10 DIAGNOSIS — E53.8 B12 DEFICIENCY: ICD-10-CM

## 2022-06-10 DIAGNOSIS — D61.818 PANCYTOPENIA (HCC): Primary | ICD-10-CM

## 2022-06-10 DIAGNOSIS — R33.9 URINARY RETENTION: ICD-10-CM

## 2022-06-10 DIAGNOSIS — Z95.818 PRESENCE OF WATCHMAN LEFT ATRIAL APPENDAGE CLOSURE DEVICE: ICD-10-CM

## 2022-06-10 DIAGNOSIS — R73.02 IGT (IMPAIRED GLUCOSE TOLERANCE): ICD-10-CM

## 2022-06-10 DIAGNOSIS — Z95.1 S/P CABG X 4: ICD-10-CM

## 2022-06-10 PROBLEM — I42.9 CARDIOMYOPATHY (HCC): Status: RESOLVED | Noted: 2021-07-01 | Resolved: 2022-06-10

## 2022-06-10 LAB
FERRITIN SERPL-MCNC: 73 NG/ML (ref 8–388)
IRON SATN MFR SERPL: 29 % (ref 20–50)
IRON SERPL-MCNC: 89 UG/DL (ref 50–175)
RETICS/RBC NFR AUTO: 0.8 % (ref 0.5–2.5)
TIBC SERPL-MCNC: 311 UG/DL (ref 250–450)

## 2022-06-10 PROCEDURE — G8420 CALC BMI NORM PARAMETERS: HCPCS | Performed by: INTERNAL MEDICINE

## 2022-06-10 PROCEDURE — G8536 NO DOC ELDER MAL SCRN: HCPCS | Performed by: INTERNAL MEDICINE

## 2022-06-10 PROCEDURE — 82728 ASSAY OF FERRITIN: CPT

## 2022-06-10 PROCEDURE — 83540 ASSAY OF IRON: CPT

## 2022-06-10 PROCEDURE — 1101F PT FALLS ASSESS-DOCD LE1/YR: CPT | Performed by: INTERNAL MEDICINE

## 2022-06-10 PROCEDURE — 85045 AUTOMATED RETICULOCYTE COUNT: CPT

## 2022-06-10 PROCEDURE — 1123F ACP DISCUSS/DSCN MKR DOCD: CPT | Performed by: INTERNAL MEDICINE

## 2022-06-10 PROCEDURE — G8510 SCR DEP NEG, NO PLAN REQD: HCPCS | Performed by: INTERNAL MEDICINE

## 2022-06-10 PROCEDURE — 84155 ASSAY OF PROTEIN SERUM: CPT

## 2022-06-10 PROCEDURE — G0463 HOSPITAL OUTPT CLINIC VISIT: HCPCS | Performed by: INTERNAL MEDICINE

## 2022-06-10 PROCEDURE — G8427 DOCREV CUR MEDS BY ELIG CLIN: HCPCS | Performed by: INTERNAL MEDICINE

## 2022-06-10 PROCEDURE — 99214 OFFICE O/P EST MOD 30 MIN: CPT | Performed by: INTERNAL MEDICINE

## 2022-06-10 PROCEDURE — 36415 COLL VENOUS BLD VENIPUNCTURE: CPT

## 2022-06-10 PROCEDURE — 82746 ASSAY OF FOLIC ACID SERUM: CPT

## 2022-06-10 PROCEDURE — 82607 VITAMIN B-12: CPT

## 2022-06-10 NOTE — PROGRESS NOTES
Francoise Ney presents with   Chief Complaint   Patient presents with    Follow-up     6 month    Cholesterol Problem    Labs     6-2-22          1. \"Have you been to the ER, urgent care clinic since your last visit? Hospitalized since your last visit? \" No    2. \"Have you seen or consulted any other health care providers outside of the [address] [place] [address] since your last visit? \" [facility] (Dr. Princess Loredo)     3. For patients aged 39-70: Has the patient had a colonoscopy / FIT/ Cologuard?  NA - based on age

## 2022-06-11 LAB
FOLATE SERPL-MCNC: >20 NG/ML
VIT B12 SERPL-MCNC: 1444 PG/ML (ref 232–1245)

## 2022-06-13 LAB
ALBUMIN SERPL ELPH-MCNC: 4.4 G/DL (ref 2.9–4.4)
ALBUMIN/GLOB SERPL: 1.3 {RATIO} (ref 0.7–1.7)
ALPHA1 GLOB SERPL ELPH-MCNC: 0.2 G/DL (ref 0–0.4)
ALPHA2 GLOB SERPL ELPH-MCNC: 0.8 G/DL (ref 0.4–1)
B-GLOBULIN SERPL ELPH-MCNC: 1.1 G/DL (ref 0.7–1.3)
GAMMA GLOB SERPL ELPH-MCNC: 1.3 G/DL (ref 0.4–1.8)
GLOBULIN SER CALC-MCNC: 3.4 G/DL (ref 2.2–3.9)
M PROTEIN SERPL ELPH-MCNC: NORMAL G/DL
PROT SERPL-MCNC: 7.8 G/DL (ref 6–8.5)

## 2022-06-20 ENCOUNTER — PATIENT OUTREACH (OUTPATIENT)
Dept: CASE MANAGEMENT | Age: 87
End: 2022-06-20

## 2022-06-20 NOTE — PROGRESS NOTES
Care Transitions Initial Call    Call within 2 business days of discharge: Yes     Patient: Ambar Barboza Patient : 1935 MRN: [id number]    Last Discharge [address]       Complaint Diagnosis Description Type Department Provider    21 POSSIBLE STROKE  Cerebrovascular accident (CVA), unspecified mechanism ([facility] [address] . .. ED to Hosp-Admission (Discharged) (ADMIT) Simone Ramirez MD; Floyd Gordon. .. Was this an external facility discharge? Yes, 22-22 [facility]: Left sided weakness    Challenges to be reviewed by the provider   Additional needs identified to be addressed with provider: no  none         Method of communication with provider : chart routing    Discussed COVID-19 related testing which was not done at this time. Test results were not done. Patient informed of results, if available? N/A     Advance Care Planning:   Does patient have an Advance Directive: yes, reviewed and needs to be updated. Inpatient Readmission Risk score: Unplanned Readmit Risk Score: 11    Was this a readmission? no   Patient stated reason for the admission: N/A    Patients top risk factors for readmission: medical condition-left sided weakness   Interventions to address risk factors: Scheduled appointment with PCP-[facility] appt request sent for earlier ALISON CARLISLE appt, Scheduled appointment with Specialist-Advised daughter to schedule cardiology follow up, Obtained and reviewed discharge summary and/or continuity of care documents and Establishment or re-establishment of referrals-Patient is primary caregiver for wife who has dementia. Daughter verbalized patient is oversourced for Medicaid. Daughter feels this is causing a great deal of stress for patient and would like infomation of resources for personal care aide to lessen caregiver burden on patient. CTN has sent SW referral to [facility] for assistance.      Care Transition Nurse (CTN) contacted the patient by telephone to perform post hospital discharge assessment. Verified name and  with patient as identifiers. Provided introduction to self, and explanation of the CTN role. Patient provided CTN verbal permission to speak with Marilin Fleming, daughter. CTN reviewed discharge instructions, medical action plan and red flags with family who verbalized understanding. Were discharge instructions available to patient? yes. Reviewed appropriate site of care based on symptoms and resources available to patient including: PCP, Specialist, When to call 911 and CTN. Family given an opportunity to ask questions and does not have any further questions or concerns at this time. The family agrees to contact the PCP office for questions related to their healthcare. Medication reconciliation was performed with family, who verbalizes understanding of administration of home medications. Advised obtaining a 90-day supply of all daily and as-needed medications. Referral to Pharm D needed: no     Home Health/Outpatient orders at discharge: none      Durable Medical Equipment ordered at discharge: None      Was patient discharged with a pulse oximeter? no    Discussed follow-up appointments. If no appointment was previously scheduled, appointment scheduling offered: yes. Is follow up appointment scheduled within 7 days of discharge? no.   [facility] follow up appointment(s):   Future Appointments   Date Time Provider Rosalee Bangura   2022  8:20 AM Volodymyr Padilla MD [facility] BS AMB   2022  1:15 PM MD Ángel Babcock   2022  9:05 AM [facility] LAB VISIT [facility] BS AMB   12/15/2022 11:20 AM Volodymyr Padilla MD [facility]     Non-[facility] follow up appointment(s): Daughter to schedule follow up appt with Dr. Dionisio Slaughter (cardiology). Plan for follow-up call in 7-10 days based on severity of symptoms and risk factors.   Plan for next call: symptom management-s/s of stroke; left sided weakness and follow up appointment-earlier scheduling of [facility]; scheduling of cardiology follow up  CTN provided contact information for future needs. Goals Addressed                 This Visit's Progress     Attends follow-up appointments as directed. Goal: Patient will attend all appointments scheduled within the next 30 days. Ensure provider appt is scheduled within 7 business days post-discharge; 6/20: [facility] appt > 7 days; 6/28 @ 820 AM. [facility] appt request sent for earlier appt. Confirm patient attended post-discharge provider appt   Complete post-visit call to confirm attendance and update care needs           Prevent complications post hospitalization. Goal: No admissions post 30 days from discharge of 6/27/22. Review/educate common or potential \"red flags\" of condition worsening; 6/20: Reviewed/educated daughter on red flags of stroke:  BE FAST     Balance: Change or sudden loss of balance; weakness    Eyes: Loss of vision in one or both eyes or change in vision (blurriness)     Face: Unevenness in face (when smiling facial drooping on side) or numbness/tingling of lower face    Arms: One arm hangs down (when raising both arms one arm feels heavier than the other or inability to raise arms to equal height)    Speech: Slurred speech, difficulty speaking (tongue feeling fat) expressing thoughts or confusion    Time: Time is brain. If you experience any of the above, CALL 911 NOW!!!!       Evaluate adherence to medications and priority barriers to resolve; 6/20: Daughter voiced patient is taking ASA 81 daily and Plavix 75 mg daily. Daughter aware patient is to take ASA 81 X 21 days then stop but will continue with Plavix daily. Instruct on adherence to medications as ordered and assess for therapeutic response and side-effects          Communicate visits and goals between multiple providers and services; 6/20: Advised daughter to contact Dr. Kayla Gonzales (cardiology) to schedule follow up appt.      Assess for health risk behaviors and educate patient/caregivers on reducing risk; 6/20: Daughter voiced patient can only take name brand Crestor X 1 week and then has to stop X 3-4 days due to intolerance. Discuss and provide resources for ACP; 6/20: Reviewed. Discussed updating AMD with daughter; current document > 8years old.

## 2022-06-20 NOTE — PROGRESS NOTES
Initial Contact Social Work Note - Ambulatory  6/20/2022      Date of referral: 6/20/2022  Referral received from: Grabiel Oliva  Reason for referral: Assist the patient with obtaining personal care assistance to assist with the care of his spouse. Previous SW Referral: No    If yes, brief summary and outcome:  None    Two Identifiers Verified: Yes    Insurance Provider: Medicare Part A&B;  for Life    Support System: Spouse/Partner and Adult Child/Children      Status: [place]    Community Providers: Home Health     ADL Assistance: N/A    Housing/Living Concerns or Home Modification Needs: None mentioned    Transportation Concern: None    Medication Cost Concern: None    Medication Education or Adherence Concern: None    Financial Concern(s): None    Income (only if applicable): None     Ability to Read/Write: Yes    Advance Care Plan:  Reviewed, no longer accurate, pt declined updating at this time    Other: N/A    Identified Needs:      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 Social Work Plan:     Provide the patient/family with information on personal care agencies.  Next Steps: Follow up for updates. Method of Communication with Provider (if appropriate): chart routing       Referral received to provide the patient with options for personal care agencies to assist with the care of his spouse. MSW contacted the patient and spoke with the patient's daughter Brittney Cade. Mr. Caroline Whitlock gave authorization to communicate with his daughter. The patient and his spouse are currently staying at his daughter's house due to his recent stroke and weakness on his left side. The patient is receiving support from his son, daughter and their spouses. The patient's daughter reported that the patient will need assistance with caring for his wife.  They're seeking assistance for a couple of days weekly for a few hours a day. MSW shared with Nicholas Perry, that the agencies usually request to provide services for a minimal of 3-4 hours on any given day needed. She verbalized understanding. MSW provided her with contact information to [employer] and [employer] [address]. She shared that she has every equipment needed both in her home and at the patient's residence. The family will assist during the hours when an aid is not available. The patient's daughter was appreciative for the information provided and is open to receiving follow up calls for questions, issues or concerns. MSW will follow to assist the patient as needed.

## 2022-06-27 ENCOUNTER — PATIENT OUTREACH (OUTPATIENT)
Dept: CASE MANAGEMENT | Age: 87
End: 2022-06-27

## 2022-06-27 NOTE — PROGRESS NOTES
Care Transitions Follow Up Call    Challenges to be reviewed by the provider   Additional needs identified to be addressed with provider: no  none           Method of communication with provider : none    Care Transition Nurse (CTN) contacted the patient by telephone to follow up after admission on 22. Verified name and  with patient as identifiers. Unsteady gait or off balance/weakness:  YES/NO: NO  Double/Blurred vision: YES/NO: NO  Facial drooping/drooping smile or eye: YES/NO: NO  Weakness or paralysis of arm/leg or any part of the body: YES/NO: NO   Numbness/tingling/decreased sensation of face (lower mouth): NO  Slurred speech/difficulty speaking or writing (tongue feeling fat): NO  Difficulty swallowing or drooling: NO      Addressed changes since last contact: none  Follow up appointment completed? no.   Was follow up appointment scheduled within 7 days of discharge? no.     Advance Care Planning:   Does patient have an Advance Directive:  yes; reviewed and current     CTN reviewed discharge instructions, medical action plan and red flags with patient and discussed any barriers to care and/or understanding of plan of care after discharge. Discussed appropriate site of care based on symptoms and resources available to patient including: PCP, When to call 911 and CTN. The patient agrees to contact the PCP office for questions related to their healthcare. Patients top risk factors for readmission: medical condition-CVA   Interventions to address risk factors: Scheduled appointment with PCP-CTN reminded patient of [facility] appt tomorrow and Assessed patient for red flags of stroke. Patient denied red flags and voiced he is pretty much back to his baseline. Patient reported he has practicing his guitar and working is his garden without any problems.      [address] follow up appointment(s):   Future Appointments   Date Time Provider Rosalee Bangura   2022  8:20 AM Monica Vázquez MD [facility] BS AMB 7/13/2022  1:15 PM Darryle Leeks, MD [facility]   12/8/2022  9:05 AM [facility] LAB VISIT [facility] BS AMB   12/15/2022 11:20 AM Nabil Polo MD [facility] BS AMB     Non-[facility] follow up appointment(s): none    CTN provided contact information for future needs. Plan for follow-up call in 7-10 days based on severity of symptoms and risk factors. Plan for next call: symptom management-assess for residual effects of stroke and follow up appointment-verify attendance of [facility] appt     Goals Addressed                 This Visit's Progress     Attends follow-up appointments as directed. On track     Goal: Patient will attend all appointments scheduled within the next 30 days. Ensure provider appt is scheduled within 7 business days post-discharge; 6/20: [facility] appt > 7 days; 6/28 @ 820 AM. [facility] appt request sent for earlier appt. Confirm patient attended post-discharge provider appt   Complete post-visit call to confirm attendance and update care needs; 6/27: Done.  Prevent complications post hospitalization. On track     Goal: No admissions post 30 days from discharge of 6/27/22. Review/educate common or potential \"red flags\" of condition worsening; 6/20: Reviewed/educated daughter on red flags of stroke:  BE FAST     Balance: Change or sudden loss of balance; weakness    Eyes: Loss of vision in one or both eyes or change in vision (blurriness)     Face: Unevenness in face (when smiling facial drooping on side) or numbness/tingling of lower face    Arms: One arm hangs down (when raising both arms one arm feels heavier than the other or inability to raise arms to equal height)    Speech: Slurred speech, difficulty speaking (tongue feeling fat) expressing thoughts or confusion    Time: Time is brain. If you experience any of the above, CALL 911 NOW!!!!       Evaluate adherence to medications and priority barriers to resolve; 6/20: Daughter voiced patient is taking ASA 81 daily and Plavix 75 mg daily. Daughter aware patient is to take ASA 81 X 21 days then stop but will continue with Plavix daily. Communicate visits and goals between multiple providers and services; 6/20: Advised daughter to contact Dr. Zeyad Heredia (cardiology) to schedule follow up appt. Assess for health risk behaviors and educate patient/caregivers on reducing risk; 6/20: Daughter voiced patient can only take name brand Crestor X 1 week and then has to stop X 3-4 days due to intolerance. Discuss and provide resources for ACP; 6/20: Reviewed. Discussed updating AMD with daughter; current document > 8years old.

## 2022-06-28 ENCOUNTER — OFFICE VISIT (OUTPATIENT)
Dept: INTERNAL MEDICINE CLINIC | Age: 87
End: 2022-06-28
Payer: MEDICARE

## 2022-06-28 VITALS
RESPIRATION RATE: 16 BRPM | OXYGEN SATURATION: 98 % | BODY MASS INDEX: 22.96 KG/M2 | WEIGHT: 164 LBS | DIASTOLIC BLOOD PRESSURE: 78 MMHG | TEMPERATURE: 97 F | HEIGHT: 71 IN | SYSTOLIC BLOOD PRESSURE: 148 MMHG | HEART RATE: 64 BPM

## 2022-06-28 DIAGNOSIS — I63.49 CEREBROVASCULAR ACCIDENT (CVA) DUE TO EMBOLISM OF OTHER CEREBRAL ARTERY (HCC): ICD-10-CM

## 2022-06-28 DIAGNOSIS — Z95.1 S/P CABG X 4: ICD-10-CM

## 2022-06-28 DIAGNOSIS — E78.5 HYPERLIPIDEMIA, UNSPECIFIED HYPERLIPIDEMIA TYPE: ICD-10-CM

## 2022-06-28 DIAGNOSIS — I48.0 PAROXYSMAL ATRIAL FIBRILLATION (HCC): Primary | ICD-10-CM

## 2022-06-28 PROCEDURE — G8427 DOCREV CUR MEDS BY ELIG CLIN: HCPCS | Performed by: INTERNAL MEDICINE

## 2022-06-28 PROCEDURE — 99495 TRANSJ CARE MGMT MOD F2F 14D: CPT | Performed by: INTERNAL MEDICINE

## 2022-06-28 RX ORDER — ROSUVASTATIN CALCIUM 20 MG/1
20 TABLET, COATED ORAL
COMMUNITY

## 2022-06-28 RX ORDER — EZETIMIBE 10 MG/1
10 TABLET ORAL DAILY
Qty: 30 TABLET | Refills: 11 | Status: SHIPPED | OUTPATIENT
Start: 2022-06-28

## 2022-06-28 NOTE — PROGRESS NOTES
Nica Gravely presents with   Chief Complaint   Patient presents with    Extremity Weakness     L sided was seen at [facility] on 6-16-22, then admitted to [facility]          1. \"Have you been to the ER, urgent care clinic since your last visit? Hospitalized since your last visit? \" YES, patient was admitted to [place] [address] on 6-16-22 after being evaluated by zev perkins for stroke    2. \"Have you seen or consulted any other health care providers outside of the [address] [place] [address] since your last visit? \" Aubrey Blancas for ED/hosp     3. For patients aged 39-70: Has the patient had a colonoscopy / FIT/ Cologuard?  Yes - no Care Gap present

## 2022-06-28 NOTE — PROGRESS NOTES
Patient being seen today for transitional care management    Patient was admitted to [facility] from 6/16-17/22             Initial interactive contact was done by nurse navigator     I thoroughly reviewed the discharge summary, notes, consults, labs and imaging studies in the electronic record. Pertinent details are summarized below and the record has been updated to reflect recent events    He woke up to walk his dog but found that he could not open the door because of left upper extremity weakness. He was noted to have left facial droop as well. Presented to the ER and was noted to have an acute right-sided frontal CVA with minimal petechial hemorrhage. He was admitted, seen by neurology, felt to be aspirin failure so they sent him out with Plavix/aspirin for 21 days followed by Plavix thereafter. Echo as below, negative bubble study. MRI of the head as above. CTA head and neck showed no definite large vessel occlusion, no significant carotid stenosis, no high-grade vertebral artery stenosis or intracranial stenosis. Since discharge, he has recovered all his motor functions. Reports that he is unable to tolerate Crestor 40 so he's been cutting it down to 20. He would be open to adding something else to drop his LDL some more.   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    BPH with obstruction/lower urinary tract symptoms     on CT 8/19; urinary retention 2021; Dr Winters Salvage CAD (coronary artery disease)     cath 2010 LM 20-30%, LAD 60-70%, Cx 100%, RCA prox 70&, mid 80%, ef 45%    Cardiomyopathy ([facility] [address] 07/2021    GERD     H/O echocardiogram 07/2021    ef 35-40%, mild cLVH, severe dilated LV, gr 2 dd, pulm htn pap 37, severe LAE, KENNEDY, neg bubble (7/21); lvh, ef 51%, LAE/KNENEDY, nl valve, neg bubble (6/22)    Hyperlipidemia     ICH (intracerebral hemorrhage) ([address] 05/08/2018    [facility]; cryptogenic RIGHT    IGT (impaired glucose tolerance) 07/2021    Presence of Watchman left atrial appendage closure device 09/2021    Dr Norman [facility] PUD (peptic ulcer disease)     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    LIMA - LAD/D1,  SVG - OM,  SVG - PDA    Stroke (cerebrum) ([address] 07/2021    LUE weakness/facial droop; mri acute isch right parietal cetrum semiovle subcortical WM; CTA neg; ODALIS neg; eliquis per neuro    Stroke (cerebrum) ([facility] [address] 06/2022    SOH; LUE weakness and facial droop; acute cortical/succort stevo RIGHT frontal infarct, min petechial hemorrhage; asa/plavix x21 d the pklavix [facility] neurology    Urinary retention 09/2021    postop; [facility]     Past Surgical History:   Procedure Laterality Date    HX APPENDECTOMY      HX CHOLECYSTECTOMY  09/03/2019    [facility] Dr Mehreen Villavicencio HX COLONOSCOPY  2014? 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   Tobacco Use    Smoking status: Never Smoker    Smokeless tobacco: Never Used   Substance and Sexual Activity    Alcohol use: No    Drug use: No    Sexual activity: Yes     Partners: Female     Birth control/protection: None   Other Topics Concern    Not on file   Social History Narrative    Not on file     Social Determinants of Health     Financial Resource Strain:     Difficulty of Paying Living Expenses: Not on file   Food Insecurity:     Worried About Running Out of Food in the Last Year: Not on file    Alvarez of Food in the Last Year: Not on file   Transportation Needs:     Lack of Transportation (Medical):  Not on file    Lack of Transportation (Non-Medical): Not on file   Physical Activity:     Days of Exercise per Week: Not on file    Minutes of Exercise per Session: Not on file   Stress:     Feeling of Stress : Not on file   Social Connections:     Frequency of Communication with Friends and Family: Not on file    Frequency of Social Gatherings with Friends and Family: Not on file    Attends Baptism Services: Not on file    Active Member of [address] [place] [address] or Organizations: Not on file    Attends Club or Organization Meetings: Not on file    Marital Status: Not on file   Intimate Partner Violence:     Fear of Current or Ex-Partner: Not on file    Emotionally Abused: Not on file    Physically Abused: Not on file    Sexually Abused: Not on file   Housing Stability:     Unable to Pay for Housing in the Last Year: Not on file    Number of [place] in the Last Year: Not on file    Unstable Housing in the Last Year: Not on file     Current Outpatient Medications   Medication Sig    rosuvastatin (Crestor) 20 mg tablet Take 20 mg by mouth nightly.  ezetimibe (ZETIA) 10 mg tablet Take 1 Tablet by mouth daily.  furosemide (LASIX) 20 mg tablet TAKE ONE TABLET BY MOUTH EVERY MORNING    famotidine (PEPCID) 20 mg tablet     dutasteride (AVODART) 0.5 mg capsule Take 1 Capsule by mouth daily (after dinner).  silodosin (RAPAFLO) 8 mg capsule Take 1 Capsule by mouth daily (with dinner).  clopidogreL (PLAVIX) 75 mg tab     aspirin 81 mg chewable tablet Take 81 mg by mouth daily.  CHOLECALCIFEROL, VITAMIN D3, PO Take 5,000 Units by mouth.  VITAMIN B COMPLEX PO Take  by mouth.  MULTIVITAMIN PO Take  by mouth daily. No current facility-administered medications for this visit.      Allergies   Allergen Reactions    Atorvastatin Myalgia and Other (comments)     Myalgia    Pcn [Penicillins] Other (comments)     Diaphoretic, elevates blood pressure, insomnia    Pravastatin Myalgia and Other (comments)     Myalgia    Amoxicillin Other (comments)     Felt shaky and nervous    Rosuvastatin Other (comments)     Can only take name brand CRESTOR     Visit Vitals  BP (!) 148/78   Pulse 64   Temp 97 °F (36.1 °C) (Temporal)   Resp 16   Ht 5' 11\" (1.803 m)   Wt 164 lb (74.4 kg)   SpO2 98%   BMI 22.87 kg/m²   A&O x3  Affect is appropriate. Mood stable  No apparent distress  Anicteric, no JVD, adenopathy or thyromegaly. No carotid bruits or radiated murmur  Lungs clear to auscultation, no wheezes or rales  Heart showed irregular rhythm. No murmur, rubs, gallops  Abdomen soft nontender, no hepatosplenomegaly or masses. Extremities without edema. Pulses 1-2+ symmetrically    Assessment and plan:  1. Status post right CVA. Finish Plavix/aspirin combo then Plavix thereafter. Follow-up neurology  2. Hyperlipidemia. Long discussion about adding Zetia including potential side effects, recheck with his next routine visit. 3.  Hypertension. Continue current as doing well at home running in the [id number] systolic when he checks      RTC 12/22    Above conditions discussed at length and patient vocalized understanding. All questions answered to patient satisfaction        ICD-10-CM ICD-9-CM    1. Paroxysmal atrial fibrillation (HCC)  I48.0 427.31    2. Cerebrovascular accident (CVA) due to embolism of other cerebral artery (HCC)  I63.49 434.11    3.  Hyperlipidemia, unspecified hyperlipidemia type  E78.5 272.4 ezetimibe (ZETIA) 10 mg tablet      LIPID PANEL   4. S/P CABG x 4  Z95.1 V45.81

## 2022-07-07 ENCOUNTER — PATIENT OUTREACH (OUTPATIENT)
Dept: CASE MANAGEMENT | Age: 87
End: 2022-07-07

## 2022-07-07 NOTE — PROGRESS NOTES
Care Transitions Follow Up Call    Challenges to be reviewed by the provider   Additional needs identified to be addressed with provider: no  none           Method of communication with provider : none    Care Transition Nurse (CTN) contacted the patient by telephone to follow up after admission on 22. No answer. Left HIPPA compliant message. Name, role and contact information provided. Requested return call. Will attempt to contact at a later time. [address], daughter (listed on [facility]). Verified name and  with family as identifiers. Daughter reported patient is back to his baseline. Daughter reported patient does get fatigued more easily than before but is otherwise doing well. Addressed changes since last contact: medications- Patient started on Zetia on . Will assess for side effects at a later time. Follow up appointment completed? yes. Was follow up appointment scheduled within 7 days of discharge? no.     Advance Care Planning:   Does patient have an Advance Directive:  on file    CTN reviewed discharge instructions, medical action plan and red flags with family and discussed any barriers to care and/or understanding of plan of care after discharge. Discussed appropriate site of care based on symptoms and resources available to patient including: PCP, Specialist, When to call 911 and CTN. The family agrees to contact the PCP office for questions related to their healthcare. Patients top risk factors for readmission: medical condition-stroke   Interventions to address risk factors: Scheduled appointment with Specialist-Daughter verbalized patient has an upcoming appt with Dr. Tracie Elkins (cardiology); date unknown. and Assistance in accessing community resources-Daughter voiced they waiting to her back from family friend regarding providing home care for patient's wife to help relieve caregiver burden.  Daughter voiced she and her brother live 5 mins from patient and provide assistance as needed. Daughter verbalized if family is unable to provide care she does have contact info for WILFRED Cuellar. [facility] follow up appointment(s):   Future Appointments   Date Time Provider Rosalee Urrutiai   7/13/2022  1:15 PM Yuki Cartagena MD [facility]   12/8/2022  9:05 AM IO LAB VISIT [facility] BS AMB   12/15/2022 11:20 AM Edith Morales MD [facility] BS AMB     Non-[facility] follow up appointment(s): none    CTN provided contact information for future needs. Plan for follow-up call in 10-14 days based on severity of symptoms and risk factors. Plan for next call: symptom management-assess for red flags of stroke, follow up appointment-veify attendance of follow up appt with Dr. Regina Cunha (cardiology) and medication management-verify completion of ASA 81 and continuation of Plavix, assess for signs of bleeding and side effects of Zetia     Goals Addressed                 This Visit's Progress     Attends follow-up appointments as directed. On track     Goal: Patient will attend all appointments scheduled within the next 30 days. Ensure provider appt is scheduled within 7 business days post-discharge; 6/20: [facility] appt > 7 days; 6/28 @ 820 AM. [facility] appt request sent for earlier appt. Confirm patient attended post-discharge provider appt ; 7/7: Attended [facility] appt on 6/28. Complete post-visit call to confirm attendance and update care needs; 6/27: Done. 7/7: Done.  Prevent complications post hospitalization. Goal: No admissions post 30 days from discharge of 6/27/22.       Review/educate common or potential \"red flags\" of condition worsening; 6/20: Reviewed/educated daughter on red flags of stroke:  BE FAST     Balance: Change or sudden loss of balance; weakness    Eyes: Loss of vision in one or both eyes or change in vision (blurriness)     Face: Unevenness in face (when smiling facial drooping on side) or numbness/tingling of lower face    Arms: One arm hangs down (when raising both arms one arm feels heavier than the other or inability to raise arms to equal height)    Speech: Slurred speech, difficulty speaking (tongue feeling fat) expressing thoughts or confusion    Time: Time is brain. If you experience any of the above, CALL 911 NOW!!!!       Evaluate adherence to medications and priority barriers to resolve; 6/20: Daughter voiced patient is taking ASA 81 daily and Plavix 75 mg daily. Daughter aware patient is to take ASA 81 X 21 days then stop but will continue with Plavix daily. 7/7: Advised daughter to remind patient to complete 21 days of ASA and continue Plavix. Communicate visits and goals between multiple providers and services; 6/20: Advised daughter to contact Dr. Marina Maldonado (cardiology) to schedule follow up appt. 7/7: Daughter voiced patient has upcoming appt with Dr. Marina Maldonado, date unknown. Assess for health risk behaviors and educate patient/caregivers on reducing risk; 6/20: Daughter voiced patient can only take name brand Crestor X 1 week and then has to stop X 3-4 days due to intolerance. 7/7: Patient started on Zetia on 6/28. Discuss and provide resources for ACP; 6/20: Reviewed. Discussed updating AMD with daughter; current document > 8years old.

## 2022-07-20 ENCOUNTER — PATIENT OUTREACH (OUTPATIENT)
Dept: CASE MANAGEMENT | Age: 87
End: 2022-07-20

## 2022-07-20 NOTE — PROGRESS NOTES
Care Transitions Follow Up Call    Challenges to be reviewed by the provider   Additional needs identified to be addressed with provider: no  none           Method of communication with provider : none    Care Transition Nurse (CTN) contacted the patient by telephone to follow up after admission on 6/16/22. No answer. Left HIPPA compliant message. Name, role and contact information provided. Requested return call. Patient has graduated from the Transitions of Care Coordination  program on 7/20/22. Patient/family has the ability to self-manage at this time Care management goals have been completed. Patient was not referred to the [facility] team for further management. Goals Addressed                   This Visit's Progress     COMPLETED: Attends follow-up appointments as directed. Goal: Patient will attend all appointments scheduled within the next 30 days. Ensure provider appt is scheduled within 7 business days post-discharge; 6/20: [facility] appt > 7 days; 6/28 @ 820 AM. [facility] appt request sent for earlier appt. Confirm patient attended post-discharge provider appt ; 7/7: Attended [facility] appt on 6/28. Complete post-visit call to confirm attendance and update care needs; 6/27: Done. 7/7: Done. 7/20: Attempted. COMPLETED: Prevent complications post hospitalization. Goal: No admissions post 30 days from discharge of 6/27/22.       Review/educate common or potential \"red flags\" of condition worsening; 6/20: Reviewed/educated daughter on red flags of stroke:  BE FAST     Balance: Change or sudden loss of balance; weakness    Eyes: Loss of vision in one or both eyes or change in vision (blurriness)     Face: Unevenness in face (when smiling facial drooping on side) or numbness/tingling of lower face    Arms: One arm hangs down (when raising both arms one arm feels heavier than the other or inability to raise arms to equal height)    Speech: Slurred speech, difficulty speaking (tongue feeling fat) expressing thoughts or confusion    Time: Time is brain. If you experience any of the above, CALL 911 NOW!!!!       Evaluate adherence to medications and priority barriers to resolve; 6/20: Daughter voiced patient is taking ASA 81 daily and Plavix 75 mg daily. Daughter aware patient is to take ASA 81 X 21 days then stop but will continue with Plavix daily. 7/7: Advised daughter to remind patient to complete 21 days of ASA and continue Plavix. Communicate visits and goals between multiple providers and services; 6/20: Advised daughter to contact Dr. Renne Opitz (cardiology) to schedule follow up appt. 7/7: Daughter voiced patient has upcoming appt with Dr. Renne Opitz, date unknown. Assess for health risk behaviors and educate patient/caregivers on reducing risk; 6/20: Daughter voiced patient can only take name brand Crestor X 1 week and then has to stop X 3-4 days due to intolerance. 7/7: Patient started on Zetia on 6/28. Discuss and provide resources for ACP; 6/20: Reviewed. Discussed updating AMD with daughter; current document > 8years old. Patient has Care Transition Nurse's contact information for any further questions, concerns, or needs.   Patients upcoming visits:    Future Appointments   Date Time Provider Rosalee Bnagura   12/8/2022  9:05 AM [facility] LAB VISIT [facility] DOREEN GERBER   12/15/2022 11:20 AM Dumaran, Phyllistine Duverney, MD [facility] BS AMB

## 2022-09-15 ENCOUNTER — PATIENT OUTREACH (OUTPATIENT)
Dept: CASE MANAGEMENT | Age: 87
End: 2022-09-15

## 2022-09-15 NOTE — PROGRESS NOTES
Social Work Note  9/15/2022    Date of referral: 6/20/2022  Referral received from: Grabiel Oliva  Reason for referral: Assist the patient with obtaining personal care assistance to assist with the care of his spouse. Previous SW Referral: No    If yes, brief summary and outcome:  None     Two Identifiers Verified: Yes     Insurance Provider: Medicare Part A&B;  for Life     Support System: Spouse/Partner and Adult Child/Children      [place] Status: [place]     Community Providers: Home Health      ADL Assistance: N/A     Housing/Living Concerns or Home Modification Needs: None mentioned     Transportation Concern: None     Medication Cost Concern: None     Medication Education or Adherence Concern: None     Financial Concern(s): None     Income (only if applicable): None      Ability to Read/Write: Yes     Advance Care Plan:  Reviewed, no longer accurate, pt declined updating at this time     Other: N/A     Identified Needs:      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 Social Work Plan:     Provide the patient/family with information on personal care agencies. (Completed)   Next Steps: Follow up for updates. Method of Communication with Provider (if appropriate): chart routing         Referral received to provide the patient with options for personal care agencies to assist with the care of his spouse. MSW contacted the patient Mr. Kosta Silverio and  introduced self, role and reason for call. He shared that he and his spouse have returned to their home. He mentioned that they are both under the weather but doing well. He gave MSW permission to follow up with his daughter for assistance. MSW contacted the patient's daughter Benjy Pacheco and introduced self, role and reason for call.  The patient's daughter reported that the patient and his spouse are currently not feeling well due to changes in weather. She mentioned that she tested them both for COVID and the results were negative. Myrna Rcih informed MSW that the patient was not open to having someone to assist in the care of his spouse until she recently convinced him. She shared that her brother had a friend willing to assist, however the plans fell through. Myrna Rich was open to receiving additional information on personal care services. MSW provided her with contact information to several agencies. She mentioned that they will need an aid 2 days weekly for 2-3 hours each day. The patient's daughter was provided with contact information for questions, issues or concerns. She verbalized understanding. She had no issues or concerns to report. MSW will follow to assist as needed.

## 2022-11-03 ENCOUNTER — PATIENT OUTREACH (OUTPATIENT)
Dept: CASE MANAGEMENT | Age: 87
End: 2022-11-03

## 2022-11-03 NOTE — PROGRESS NOTES
Social Work Note  11/3/2022    Date of referral: 6/20/2022  Referral received from: Grabiel Oliva  Reason for referral: Assist the patient with obtaining personal care assistance to assist with the care of his spouse. Previous SW Referral: No    If yes, brief summary and outcome:  None     Attempt made to contact patient/daughter. Left message with information to return call. MSW will follow to assist as needed.

## 2022-11-16 ENCOUNTER — PATIENT OUTREACH (OUTPATIENT)
Dept: CASE MANAGEMENT | Age: 87
End: 2022-11-16

## 2022-11-16 NOTE — PROGRESS NOTES
Social Work Note  11/16/2022    Date of referral: 6/20/2022  Referral received from: Grabiel Oliva  Reason for referral: Assist the patient with obtaining personal care assistance to assist with the care of his spouse. Previous  Referral: No    If yes, brief summary and outcome:  None    Attempt made to reach the patient's daughter Chas Mendoza. Left message with information to return call. MSW will follow to assist as needed.

## 2022-12-08 ENCOUNTER — APPOINTMENT (OUTPATIENT)
Dept: INTERNAL MEDICINE CLINIC | Age: 87
End: 2022-12-08

## 2022-12-08 ENCOUNTER — HOSPITAL ENCOUNTER (OUTPATIENT)
Dept: LAB | Age: 87
Discharge: HOME | End: 2022-12-08
Payer: MEDICARE

## 2022-12-08 DIAGNOSIS — R73.02 IGT (IMPAIRED GLUCOSE TOLERANCE): ICD-10-CM

## 2022-12-08 DIAGNOSIS — D61.818 PANCYTOPENIA (HCC): ICD-10-CM

## 2022-12-08 DIAGNOSIS — E78.5 HYPERLIPIDEMIA, UNSPECIFIED HYPERLIPIDEMIA TYPE: ICD-10-CM

## 2022-12-08 LAB
ANION GAP SERPL CALC-SCNC: 8 MMOL/L (ref 3–18)
BUN SERPL-MCNC: 20 MG/DL (ref 7–18)
BUN/CREAT SERPL: 22 (ref 12–20)
CALCIUM SERPL-MCNC: 10.1 MG/DL (ref 8.5–10.1)
CHLORIDE SERPL-SCNC: 106 MMOL/L (ref 100–111)
CHOLEST SERPL-MCNC: 193 MG/DL
CO2 SERPL-SCNC: 27 MMOL/L (ref 21–32)
CREAT SERPL-MCNC: 0.89 MG/DL (ref 0.6–1.3)
ERYTHROCYTE [DISTWIDTH] IN BLOOD BY AUTOMATED COUNT: 14.6 % (ref 11.6–14.5)
EST. AVERAGE GLUCOSE BLD GHB EST-MCNC: 117 MG/DL
GLUCOSE SERPL-MCNC: 91 MG/DL (ref 74–99)
HBA1C MFR BLD: 5.7 % (ref 4.2–5.6)
HCT VFR BLD AUTO: 39.1 % (ref 36–48)
HDLC SERPL-MCNC: 60 MG/DL (ref 40–60)
HDLC SERPL: 3.2 {RATIO} (ref 0–5)
HGB BLD-MCNC: 12.8 G/DL (ref 13–16)
LDLC SERPL CALC-MCNC: 117.4 MG/DL (ref 0–100)
LIPID PROFILE,FLP: ABNORMAL
MCH RBC QN AUTO: 31.8 PG (ref 24–34)
MCHC RBC AUTO-ENTMCNC: 32.7 G/DL (ref 31–37)
MCV RBC AUTO: 97 FL (ref 78–100)
NRBC # BLD: 0 K/UL (ref 0–0.01)
NRBC BLD-RTO: 0 PER 100 WBC
PLATELET # BLD AUTO: 148 K/UL (ref 135–420)
PMV BLD AUTO: 10.7 FL (ref 9.2–11.8)
POTASSIUM SERPL-SCNC: 4.2 MMOL/L (ref 3.5–5.5)
RBC # BLD AUTO: 4.03 M/UL (ref 4.35–5.65)
SODIUM SERPL-SCNC: 141 MMOL/L (ref 136–145)
TRIGL SERPL-MCNC: 78 MG/DL (ref ?–150)
VLDLC SERPL CALC-MCNC: 15.6 MG/DL
WBC # BLD AUTO: 4.1 K/UL (ref 4.6–13.2)

## 2022-12-08 PROCEDURE — 80061 LIPID PANEL: CPT

## 2022-12-08 PROCEDURE — 80048 BASIC METABOLIC PNL TOTAL CA: CPT

## 2022-12-08 PROCEDURE — 83036 HEMOGLOBIN GLYCOSYLATED A1C: CPT

## 2022-12-08 PROCEDURE — 36415 COLL VENOUS BLD VENIPUNCTURE: CPT

## 2022-12-08 PROCEDURE — 85027 COMPLETE CBC AUTOMATED: CPT

## 2022-12-10 NOTE — PROGRESS NOTES
82 y.o. WHITE/NON- male who presents for evaluation. 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 Most of the sx have resolved, he is supposed to be on plavix but unclear if taking. He also stopped taking the crestor as he had 'pain all over' even on the lower dosing. He says he's not taking the zetia either. No cp, sob, pnd, edema, palpitations. BP has been in good range, sees Dr Holly Carrasco and has Watchman in place    Continues to have urinary retention and self cathing 3x/d. Now on flomax but cardiology will not clear him for surgery for the BPH until next summer?     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 due to h/o cryptogenic ICH    BPH with obstruction/lower urinary tract symptoms     on CT 8/19; urinary retention 2021; Dr Sriram Gonzáles    CAD (coronary artery disease)     cath 2010 LM 20-30%, LAD 60-70%, Cx 100%, RCA prox 70&, mid 80%, ef 45%    Cardiomyopathy ([address] 07/2021    GERD     H/O echocardiogram 07/2021    ef 35-40%, mild cLVH, severe dilated LV, gr 2 dd, pulm htn pap 37, severe LAE, KENNEDY, neg bubble (7/21); lvh, ef 51%, LAE/KENNEDY, nl valve, neg bubble (6/22)    Hyperlipidemia     ICH (intracerebral hemorrhage) ([facility] 05/08/2018    [address]; cryptogenic RIGHT    IGT (impaired glucose tolerance) 07/2021    Presence of Watchman left atrial appendage closure device 09/2021    Dr Lashell RETANA (peptic ulcer disease)     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    LIMA - LAD/D1,  SVG - OM,  SVG - PDA    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    LUE weakness/facial droop; mri acute isch right parietal cetrum semiovle subcortical WM; CTA neg; ODALIS neg; eliquis per neuro    Stroke (cerebrum) ([facility] 06/2022    [address]; LUE weakness and facial droop; acute cortical/subcort stevo RIGHT frontal infarct, min petechial hemorrhage; asa/plavix x21 d the pklavix [facility] neurology    Urinary retention 09/2021    postop; [facility]     Past Surgical History:   Procedure Laterality Date    HX APPENDECTOMY      HX CHOLECYSTECTOMY  09/03/2019    [address] [facility] [address] Leatha Rader COLONOSCOPY  2014? 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 [employer]   Tobacco Use    Smoking status: Never    Smokeless tobacco: Never   Substance and Sexual Activity    Alcohol use: No    Drug use: No    Sexual activity: Yes     Partners: Female     Birth control/protection: None   Other Topics Concern    Not on file   Social History Narrative    Not on file     Social Determinants of Health     Financial Resource Strain: Not on file   Food Insecurity: Not on file   Transportation Needs: Not on file   Physical Activity: Not on file   Stress: Not on file   Social Connections: Not on file   Intimate Partner Violence: Not on file   Housing Stability: Not on file     Current Outpatient Medications   Medication Sig    furosemide (LASIX) 20 mg tablet TAKE ONE TABLET BY MOUTH EVERY MORNING    famotidine (PEPCID) 20 mg tablet     dutasteride (AVODART) 0.5 mg capsule Take 1 Capsule by mouth daily (after dinner). silodosin (RAPAFLO) 8 mg capsule Take 1 Capsule by mouth daily (with dinner). clopidogreL (PLAVIX) 75 mg tab     aspirin 81 mg chewable tablet Take 81 mg by mouth daily. CHOLECALCIFEROL, VITAMIN D3, PO Take 5,000 Units by mouth. VITAMIN B COMPLEX PO Take  by mouth. MULTIVITAMIN PO Take  by mouth daily.     rosuvastatin (CRESTOR) 20 mg tablet Take 20 mg by mouth nightly. (Patient not taking: Reported on 12/15/2022)    ezetimibe (ZETIA) 10 mg tablet Take 1 Tablet by mouth daily. (Patient not taking: Reported on 12/15/2022)     No current facility-administered medications for this visit. Allergies   Allergen Reactions    Atorvastatin Myalgia and Other (comments)     Myalgia    Pcn [Penicillins] Other (comments)     Diaphoretic, elevates blood pressure, insomnia    Pravastatin Myalgia and Other (comments)     Myalgia    Amoxicillin Other (comments)     Felt shaky and nervous    Rosuvastatin Other (comments)     Can only take name brand CRESTOR     REVIEW OF SYSTEMS: colo 2013-14 Dr Brianda Lane? Ophtho - no vision change or eye pain  Oral - no mouth pain, tongue or tooth problems  Ears - no hearing loss, ear pain, fullness, no swallowing problems  Cardiac - no CP, PND, orthopnea, edema, palpitations or syncope  Chest - no breast masses  Resp - no wheezing, chronic coughing, dyspnea  GI - no heartburn, nausea, vomiting, change in bowel habits, bleeding, hemorrhoids  Urinary - no dysuria, hematuria, flank pain, urgency, frequency    Visit Vitals  /76   Pulse 61   Temp 98.3 °F (36.8 °C) (Temporal)   Resp 18   Ht 5' 11\" (1.803 m)   Wt 166 lb (75.3 kg)   SpO2 99%   BMI 23.15 kg/m²       A&O x3  Affect is appropriate. Mood stable  No apparent distress  Anicteric, no JVD, adenopathy or thyromegaly. No carotid bruits or radiated murmur  Lungs clear to auscultation, no wheezes or rales  Heart showed irregular rhythm. No murmur, rubs, gallops  Abdomen soft nontender, no hepatosplenomegaly or masses. Extremities with 1+ ankle edema.   Pulses 1-2+ symmetrically    LABS  From 3/12 showed gluc 89, cr 0.87, gfr>60, alt 18,       chol 125, tg 72,   hdl 43, ldl-c 68,   wbc 3.3, hb 13.5, plt 149  From 6/13 showed                     chol 123, tg 75,   hdl 42, ldl-c 66,                      tsh 3.28  From 7/15 showed gluc 92, cr 0.90, gfr>60, alt<5,       chol 189, tg 117, hdl 43, ldl-c 123, wbc 4.2, hb 13.6, plt 152, tsh 2.75, ua neg  From 3/17 showed gluc 88, cr 0.96, gfr>60, alt 28,       chol 212, tg 105, hdl 46, ldl-c 145, wbc 4.1, hb 13.6, plt 172, tsh 2.57  From 8/18 showed gluc 90, cr 0.89, gfr>60, alt 27,       chol 189, tg 84,   hdl 55, ldl-c 117, wbc 3.9, hb 13.3, plt 141,  From 6/20 showed gluc 76, cr 0.91, gfr>60, alt 35,       chol 151, tg 62,   hdl 57, ldl-c 82  From 7/21 showed gluc 88, cr 0.73, gfr>60            hba1c 5.9, chol 151, tg 86,   hdl 48, ldl-c 86,   wbc 4.1, hb 12.2, plt 13.2, tsh 3.98  From 12/21 showed glu 79, cr 0.93, gfr>60, alt 29,       chol 126, tg 45,   hdl 58, ldl-c 59,   wbc 3.9, hb 12.4, plt 144  From 6/22 showed glu 102, cr 0.91, gfr>60, alt 24,       chol 127, tg 93,   hdl 56, ldl-c 52,   wbc 4.5, hb 12.6, plt 130, fe 89, %sat 29, ferritin 73, b12 1444, fol>20, spep neg, retic 0.8    Results for orders placed or performed during the hospital encounter of 27/81/74   METABOLIC PANEL, BASIC   Result Value Ref Range    Sodium 141 136 - 145 mmol/L    Potassium 4.2 3.5 - 5.5 mmol/L    Chloride 106 100 - 111 mmol/L    CO2 27 21 - 32 mmol/L    Anion gap 8 3.0 - 18 mmol/L    Glucose 91 74 - 99 mg/dL    BUN 20 (H) 7.0 - 18 MG/DL    Creatinine 0.89 0.6 - 1.3 MG/DL    BUN/Creatinine ratio 22 (H) 12 - 20      eGFR >60 >60 ml/min/1.73m2    Calcium 10.1 8.5 - 10.1 MG/DL   CBC W/O DIFF   Result Value Ref Range    WBC 4.1 (L) 4.6 - 13.2 K/uL    RBC 4.03 (L) 4.35 - 5.65 M/uL    HGB 12.8 (L) 13.0 - 16.0 g/dL    HCT 39.1 36.0 - 48.0 %    MCV 97.0 78.0 - 100.0 FL    MCH 31.8 24.0 - 34.0 PG    MCHC 32.7 31.0 - 37.0 g/dL    RDW 14.6 (H) 11.6 - 14.5 %    PLATELET 933 742 - 529 K/uL    MPV 10.7 9.2 - 11.8 FL    NRBC 0.0 0  WBC    ABSOLUTE NRBC 0.00 0.00 - 0.01 K/uL   HEMOGLOBIN A1C WITH EAG   Result Value Ref Range    Hemoglobin A1c 5.7 (H) 4.2 - 5.6 %    Est. average glucose 117 mg/dL   LIPID PANEL   Result Value Ref Range    LIPID PROFILE Cholesterol, total 193 <200 MG/DL    Triglyceride 78 <150 MG/DL    HDL Cholesterol 60 40 - 60 MG/DL    LDL, calculated 117.4 (H) 0 - 100 MG/DL    VLDL, calculated 15.6 MG/DL    CHOL/HDL Ratio 3.2 0 - 5.0       We reviewed the patient's labs from the last several visits to point out trends in the numbers        Patient Active Problem List   Diagnosis Code    GERD (gastroesophageal reflux disease) K21.9    Coronary artery disease I25.10    Advance directive discussed with patient Z71.89    Hyperlipidemia E78.5    History of intracranial hemorrhage Z86.79    S/P CABG x 4 Z95.1    CVA (cerebral vascular accident) ([address] I63.9    Paroxysmal atrial fibrillation ([address] I48.0    Presence of Watchman left atrial appendage closure device Z95.818    Urinary retention R33.9    IGT (impaired glucose tolerance) R73.02     Assessment and plan:  1. GERD. Avoidance measures  2. CHD. Continue current regimen and f/u Dr Yesy Blanco  3. HLP. At target on crestor  4. S/P watchman procedure. Off NOAC, follow up Dr Yesy Blanco  5. CVA. plavix and follow up neuro  6. BPH and urinary retention. Per Dr Ben Albarado  7. Mild anemia. Declined hematology, continue to follow        RTC 4/23    Above conditions discussed at length and patient vocalized understanding. All questions answered to patient satisfaction          ICD-10-CM ICD-9-CM    1. Atherosclerosis of native coronary artery of native heart without angina pectoris  I25.10 414.01       2. Cerebrovascular accident (CVA) due to embolism of other cerebral artery (HCC)  I63.49 434.11       3. History of intracranial hemorrhage  Z86.79 V12.59       4. Hyperlipidemia, unspecified hyperlipidemia type  E78.5 272.4 LIPID PANEL      5. IGT (impaired glucose tolerance)  R73.02 790.22 CBC W/O DIFF      HEMOGLOBIN A1C WITH EAG      6. Presence of Watchman left atrial appendage closure device  Z95.818 V45.09       7. S/P CABG x 4  Z95.1 V45.81       8.  Urinary retention  R33.9 788.20

## 2022-12-10 NOTE — PROGRESS NOTES
This is the Subsequent Medicare Annual Wellness Exam    I have reviewed the patient's medical history in detail and updated the computerized patient record. Assessment/Plan   Education and counseling provided:  Are appropriate based on today's review and evaluation  Influenza Vaccine  Diabetes screening test    1. Atherosclerosis of native coronary artery of native heart without angina pectoris  2. Cerebrovascular accident (CVA) due to embolism of other cerebral artery ([address]  3. History of intracranial hemorrhage  4. Hyperlipidemia, unspecified hyperlipidemia type  -     LIPID PANEL; Future  5. IGT (impaired glucose tolerance)  -     CBC W/O DIFF; Future  -     HEMOGLOBIN A1C WITH EAG; Future  6. Presence of Watchman left atrial appendage closure device  7. S/P CABG x 4  8. Urinary retention  9. Medicare annual wellness visit, subsequent       Depression Risk Factor Screening     3 most recent PHQ Screens 12/15/2022   Little interest or pleasure in doing things Not at all   Feeling down, depressed, irritable, or hopeless Not at all   Total Score PHQ 2 0       Alcohol & Drug Abuse Risk Screen    Do you average more than 1 drink per night or more than 7 drinks a week: No    In the past three months have you have had more than 4 drinks containing alcohol on one occasion: No          Functional Ability and Level of Safety    Hearing: Hearing is good. Activities of Daily Living: The home contains: no safety equipment. Patient does total self care      Ambulation: with mild difficulty     Fall Risk:  Fall Risk Assessment, last 12 mths 12/15/2022   Able to walk? Yes   Fall in past 12 months? 0   Do you feel unsteady?  0   Are you worried about falling 0      Abuse Screen:  Patient is not abused       Cognitive Screening    Has your family/caregiver stated any concerns about your memory: no     Cognitive Screening: Normal - Mini Cog Test    Health Maintenance Due     Health Maintenance Due   Topic Date Due DTaP/Tdap/Td series (1 - Tdap) Never done    COVID-19 Vaccine (3 - Booster for Pfizer series) 04/24/2021    Flu Vaccine (1) 08/01/2022       Patient Care Team   Patient Care Team:  Vandana Morales MD as PCP - General (Internal Medicine Physician)  Vandana Morales MD as PCP - [facility] Empaneled Provider  Alexsandra Fuentes MD (Cardiovascular Disease Physician)  Karen Valdes MD (Ophthalmology)  Uriah Laguerre as     History     Patient Active Problem List   Diagnosis Code    GERD (gastroesophageal reflux disease) K21.9    Coronary artery disease I25.10    Advance directive discussed with patient Z71.89    Hyperlipidemia E78.5    History of intracranial hemorrhage Z86.79    S/P CABG x 4 Z95.1    CVA (cerebral vascular accident) ([facility] I63.9    Paroxysmal atrial fibrillation ([facility] I48.0    Presence of Watchman left atrial appendage closure device Z95.818    Urinary retention R33.9    IGT (impaired glucose tolerance) R73.02     Past Medical History:   Diagnosis Date    Atrial fibrillation ([facility]     Chads2 2; no OAC due to h/o cryptogenic ICH    BPH with obstruction/lower urinary tract symptoms     on CT 8/19; urinary retention 2021; Dr Lesvia Castillo    CAD (coronary artery disease)     cath 2010 LM 20-30%, LAD 60-70%, Cx 100%, RCA prox 70&, mid 80%, ef 45%    Cardiomyopathy ([facility] 07/2021    GERD     H/O echocardiogram 07/2021    ef 35-40%, mild cLVH, severe dilated LV, gr 2 dd, pulm htn pap 37, severe LAE, KENNEDY, neg bubble (7/21); lvh, ef 51%, LAE/KENNEDY, nl valve, neg bubble (6/22)    Hyperlipidemia     ICH (intracerebral hemorrhage) ([facility] 05/08/2018    [facility]; cryptogenic RIGHT    IGT (impaired glucose tolerance) 07/2021    Presence of Watchman left atrial appendage closure device 09/2021    Dr Kylah Rader    PUMEDINA (peptic ulcer disease)     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    LIMA - LAD/D1,  SVG - OM,  SVG - PDA    Stroke (cerebrum) ([address] 07/2021    LUE weakness/facial droop; mri acute isch right parietal cetrum semiovle subcortical WM; CTA neg; ODALIS neg; eliquis per neuro    Stroke (cerebrum) ([address] 06/2022    [facility]; LUE weakness and facial droop; acute cortical/subcort stevo RIGHT frontal infarct, min petechial hemorrhage; asa/plavix x21 d the pklavix [facility] neurology    Urinary retention 09/2021    postop; [facility]      Past Surgical History:   Procedure Laterality Date    HX APPENDECTOMY      HX CHOLECYSTECTOMY  09/03/2019    [facility] Dr Monster Valero COLONOSCOPY  2014? Dr Laura Humphries GRAFT  09/2010    LIMA - LAD/D1,  SVG - OM,  SVG - PDA    HX HERNIA REPAIR      [facility]; [facility] 7/21; recurrent [facility] 3/22 Dr Priscilla Rodriguez     Current Outpatient Medications   Medication Sig Dispense Refill    furosemide (LASIX) 20 mg tablet TAKE ONE TABLET BY MOUTH EVERY MORNING 90 Tablet 3    famotidine (PEPCID) 20 mg tablet       dutasteride (AVODART) 0.5 mg capsule Take 1 Capsule by mouth daily (after dinner). 90 Capsule 3    silodosin (RAPAFLO) 8 mg capsule Take 1 Capsule by mouth daily (with dinner). 90 Capsule 3    clopidogreL (PLAVIX) 75 mg tab       aspirin 81 mg chewable tablet Take 81 mg by mouth daily. CHOLECALCIFEROL, VITAMIN D3, PO Take 5,000 Units by mouth. VITAMIN B COMPLEX PO Take  by mouth. MULTIVITAMIN PO Take  by mouth daily. rosuvastatin (CRESTOR) 20 mg tablet Take 20 mg by mouth nightly. (Patient not taking: Reported on 12/15/2022)      ezetimibe (ZETIA) 10 mg tablet Take 1 Tablet by mouth daily.  (Patient not taking: Reported on 12/15/2022) 30 Tablet 11     Allergies   Allergen Reactions    Atorvastatin Myalgia and Other (comments)     Myalgia    Pcn [Penicillins] Other (comments)     Diaphoretic, elevates blood pressure, insomnia    Pravastatin Myalgia and Other (comments)     Myalgia    Amoxicillin Other (comments)     Felt shaky and nervous    Rosuvastatin Other (comments)     Can only take name brand CRESTOR       Family History   Problem Relation Age of Onset    Cancer Mother liver     Social History     Tobacco Use    Smoking status: Never    Smokeless tobacco: Never   Substance Use Topics    Alcohol use: No         Alex Hale MD

## 2022-12-13 NOTE — PROGRESS NOTES
Thiago Mahajan presents today for   Chief Complaint   Patient presents with    Annual Wellness Visit    Labs     12-8-22    Irregular Heart Beat     Paroxysmal Atrial fibrillation    Cerebrovascular Accident    Hyperlipidemia     1. \"Have you been to the ER, urgent care clinic since your last visit? Hospitalized since your last visit? \" Yes   10-03-22 @ [address] [facility] [place], Acute thoracic myofascial strain, UTI     2. \"Have you seen or consulted any other health care providers outside of the [address] [place] [address] since your last visit? \" yes     3. For patients aged 39-70: Has the patient had a colonoscopy / FIT/ Cologuard? NA - based on age      If the patient is female:    4. For patients aged 41-77: Has the patient had a mammogram within the past 2 years? NA - based on age or sex  See top three    5. For patients aged 21-65: Has the patient had a pap smear?  NA - based on age or sex

## 2022-12-15 ENCOUNTER — OFFICE VISIT (OUTPATIENT)
Dept: INTERNAL MEDICINE CLINIC | Age: 87
End: 2022-12-15
Payer: MEDICARE

## 2022-12-15 VITALS
RESPIRATION RATE: 18 BRPM | HEART RATE: 61 BPM | BODY MASS INDEX: 23.24 KG/M2 | HEIGHT: 71 IN | SYSTOLIC BLOOD PRESSURE: 130 MMHG | TEMPERATURE: 98.3 F | WEIGHT: 166 LBS | OXYGEN SATURATION: 99 % | DIASTOLIC BLOOD PRESSURE: 76 MMHG

## 2022-12-15 DIAGNOSIS — I25.10 ATHEROSCLEROSIS OF NATIVE CORONARY ARTERY OF NATIVE HEART WITHOUT ANGINA PECTORIS: Primary | ICD-10-CM

## 2022-12-15 DIAGNOSIS — R33.9 URINARY RETENTION: ICD-10-CM

## 2022-12-15 DIAGNOSIS — Z95.818 PRESENCE OF WATCHMAN LEFT ATRIAL APPENDAGE CLOSURE DEVICE: ICD-10-CM

## 2022-12-15 DIAGNOSIS — Z00.00 MEDICARE ANNUAL WELLNESS VISIT, SUBSEQUENT: ICD-10-CM

## 2022-12-15 DIAGNOSIS — Z95.1 S/P CABG X 4: ICD-10-CM

## 2022-12-15 DIAGNOSIS — Z86.79 HISTORY OF INTRACRANIAL HEMORRHAGE: ICD-10-CM

## 2022-12-15 DIAGNOSIS — R73.02 IGT (IMPAIRED GLUCOSE TOLERANCE): ICD-10-CM

## 2022-12-15 DIAGNOSIS — E78.5 HYPERLIPIDEMIA, UNSPECIFIED HYPERLIPIDEMIA TYPE: ICD-10-CM

## 2022-12-15 DIAGNOSIS — I63.49 CEREBROVASCULAR ACCIDENT (CVA) DUE TO EMBOLISM OF OTHER CEREBRAL ARTERY (HCC): ICD-10-CM

## 2022-12-15 PROCEDURE — G0463 HOSPITAL OUTPT CLINIC VISIT: HCPCS | Performed by: INTERNAL MEDICINE

## 2022-12-16 ENCOUNTER — TELEPHONE (OUTPATIENT)
Dept: INTERNAL MEDICINE CLINIC | Age: 87
End: 2022-12-16

## 2022-12-16 NOTE — TELEPHONE ENCOUNTER
Patient states he is taking the Plavix. He reports Zully Cline told him to stop the aspirin and stay on the plavix.  Aspirin has been removed from patients medication list.

## 2022-12-16 NOTE — TELEPHONE ENCOUNTER
----- Message from Madiha Osullivan MD sent at 12/16/2022 12:17 AM EST -----  Pls call  He's supposed to be on plavix - is he taking?   He had a stroke on the aspirin alone

## 2022-12-16 NOTE — PATIENT INSTRUCTIONS
Medicare Wellness Visit, Male    The best way to live healthy is to have a lifestyle where you eat a well-balanced diet, exercise regularly, limit alcohol use, and quit all forms of tobacco/nicotine, if applicable. Regular preventive services are another way to keep healthy. Preventive services (vaccines, screening tests, monitoring & exams) can help personalize your care plan, which helps you manage your own care. Screening tests can find health problems at the earliest stages, when they are easiest to treat. [facility] follows the current, evidence-based guidelines published by the [facility] ([facility]) when recommending preventive services for our patients. Because we follow these guidelines, sometimes recommendations change over time as research supports it. (For example, a prostate screening blood test is no longer routinely recommended for men with no symptoms). Of course, you and your doctor may decide to screen more often for some diseases, based on your risk and co-morbidities (chronic disease you are already diagnosed with). Preventive services for you include:  - Medicare offers their members a free annual wellness visit, which is time for you and your primary care provider to discuss and plan for your preventive service needs.  Take advantage of this benefit every year!    -Over the age of 72 should receive the recommended pneumonia vaccines.   -All adults should have a flu vaccine yearly.  -All adults should receive a tetanus vaccine every 10 years.  -Over the age of 48 should receive the shingles vaccines.    -All adults should be screened once for Hepatitis C.  -All adults age 38-68 who are overweight should have a diabetes screening test once every three years.   -Other screening tests & preventive services for persons with diabetes include: an eye exam to screen for diabetic retinopathy, a kidney function test, a foot exam, and stricter control over your cholesterol.   -Cardiovascular screening for adults with routine risk involves an electrocardiogram (ECG) at intervals determined by the provider.     -Colorectal cancer screening should be done for adults age 43-69 with no increased risk factors for colorectal cancer. There are a number of acceptable methods of screening for this type of cancer. Each test has its own benefits and drawbacks. Discuss with your provider what is most appropriate for you during your annual wellness visit. The different tests include: colonoscopy (considered the best screening method), a fecal occult blood test, a fecal DNA test, and sigmoidoscopy.    -Lung cancer screening is recommended annually with a low dose CT scan for adults between age 54 and 68, who have smoked at least 30 pack years (equivalent of 1 pack per day for 30 days), and who is a current smoker or quit less than 15 years ago. -An Abdominal Aortic Aneurysm (AAA) Screening is recommended for men age 73-68 who has ever smoked in their lifetime.      Here is a list of your current Health Maintenance items (your personalized list of preventive services) with a due date:  Health Maintenance Due   Topic Date Due    DTaP/Tdap/Td  (1 - Tdap) Never done    COVID-19 Vaccine (3 - Booster for [place] series) 04/24/2021    Yearly Flu Vaccine (1) 08/01/2022

## 2023-01-23 ENCOUNTER — TELEPHONE (OUTPATIENT)
Dept: INTERNAL MEDICINE CLINIC | Age: 88
End: 2023-01-23

## 2023-01-23 DIAGNOSIS — N30.90 CYSTITIS: Primary | ICD-10-CM

## 2023-01-23 NOTE — TELEPHONE ENCOUNTER
Patient called in stating that he thinks he has another infection from catheterization hisself. Please Advise    Patient can be reached at [phone]

## 2023-01-24 ENCOUNTER — PATIENT OUTREACH (OUTPATIENT)
Dept: CASE MANAGEMENT | Age: 88
End: 2023-01-24

## 2023-01-24 RX ORDER — CIPROFLOXACIN 500 MG/1
500 TABLET ORAL 2 TIMES DAILY
Qty: 20 TABLET | Refills: 0 | Status: SHIPPED | OUTPATIENT
Start: 2023-01-24 | End: 2023-02-03

## 2023-01-24 NOTE — PROGRESS NOTES
Social Work Note  1/24/2023    Date of referral: 6/20/2022  Referral received from: Grabiel Looace Pj  Reason for referral: Assist the patient with obtaining personal care assistance to assist with the care of his spouse. Previous  Referral: No    If yes, brief summary and outcome:  None     Attempt made to reach the patient's daughter [facility] verified). Left message with information to return call. Several attempts made with no response. Episode will be resolved. No further follow up will be required at this time. MSW will be available to assist with any future needs.

## 2023-01-24 NOTE — TELEPHONE ENCOUNTER
Patient has an appointment with [facility] [address] [place] [address] in July. (They have rescheduled once already). Patient says it would be difficult to bring in fresh urine. Can he just get the abx sent in. Please sent to [facility] [place].

## 2023-02-04 DIAGNOSIS — R73.02 IGT (IMPAIRED GLUCOSE TOLERANCE): Primary | ICD-10-CM

## 2023-02-05 DIAGNOSIS — E78.5 HYPERLIPIDEMIA, UNSPECIFIED HYPERLIPIDEMIA TYPE: Primary | ICD-10-CM

## 2023-03-31 DIAGNOSIS — R73.02 IGT (IMPAIRED GLUCOSE TOLERANCE): Primary | ICD-10-CM

## 2023-04-13 ENCOUNTER — HOSPITAL ENCOUNTER (OUTPATIENT)
Facility: HOSPITAL | Age: 88
Setting detail: SPECIMEN
Discharge: HOME | End: 2023-04-16
Payer: MEDICARE

## 2023-04-13 DIAGNOSIS — E78.5 HYPERLIPIDEMIA, UNSPECIFIED HYPERLIPIDEMIA TYPE: ICD-10-CM

## 2023-04-13 DIAGNOSIS — R73.02 IGT (IMPAIRED GLUCOSE TOLERANCE): ICD-10-CM

## 2023-04-13 LAB
CHOLEST SERPL-MCNC: 186 MG/DL
ERYTHROCYTE [DISTWIDTH] IN BLOOD BY AUTOMATED COUNT: 14.3 % (ref 11.6–14.5)
EST. AVERAGE GLUCOSE BLD GHB EST-MCNC: 114 MG/DL
HBA1C MFR BLD: 5.6 % (ref 4.2–5.6)
HCT VFR BLD AUTO: 37.9 % (ref 36–48)
HDLC SERPL-MCNC: 49 MG/DL (ref 40–60)
HDLC SERPL: 3.8 (ref 0–5)
HGB BLD-MCNC: 12.5 G/DL (ref 13–16)
LDLC SERPL CALC-MCNC: 115.4 MG/DL (ref 0–100)
LIPID PANEL: ABNORMAL
MCH RBC QN AUTO: 31.7 PG (ref 24–34)
MCHC RBC AUTO-ENTMCNC: 33 G/DL (ref 31–37)
MCV RBC AUTO: 96.2 FL (ref 78–100)
NRBC # BLD: 0 K/UL (ref 0–0.01)
NRBC BLD-RTO: 0 PER 100 WBC
PLATELET # BLD AUTO: 192 K/UL (ref 135–420)
PMV BLD AUTO: 10.2 FL (ref 9.2–11.8)
RBC # BLD AUTO: 3.94 M/UL (ref 4.35–5.65)
TRIGL SERPL-MCNC: 108 MG/DL
VLDLC SERPL CALC-MCNC: 21.6 MG/DL
WBC # BLD AUTO: 4 K/UL (ref 4.6–13.2)

## 2023-04-13 PROCEDURE — 85027 COMPLETE CBC AUTOMATED: CPT

## 2023-04-13 PROCEDURE — 83036 HEMOGLOBIN GLYCOSYLATED A1C: CPT

## 2023-04-13 PROCEDURE — 36415 COLL VENOUS BLD VENIPUNCTURE: CPT

## 2023-04-13 PROCEDURE — 80061 LIPID PANEL: CPT

## 2023-04-19 NOTE — PROGRESS NOTES
Riddhi Ireland presents today for   Chief Complaint   Patient presents with    Follow-up    Discuss Labs     4-13-23    Blood Sugar Problem    Hyperlipidemia     1. \"Have you been to the ER, urgent care clinic since your last visit? Hospitalized since your last visit? \" Yes   3-23-23 @ [facility], Acute knee pain, Elevated D-dimer. 2. \"Have you seen or consulted any other health care providers outside of the [address] [place] [address] since your last visit? \" Yes      3. For patients aged 39-70: Has the patient had a colonoscopy / FIT/ Cologuard? NA - based on age      If the patient is female:    4. For patients aged 41-77: Has the patient had a mammogram within the past 2 years? NA - based on age or sex      11. For patients aged 21-65: Has the patient had a pap smear?  NA - based on age or sex

## 2023-04-20 ENCOUNTER — OFFICE VISIT (OUTPATIENT)
Age: 88
End: 2023-04-20
Payer: MEDICARE

## 2023-04-20 VITALS
SYSTOLIC BLOOD PRESSURE: 129 MMHG | OXYGEN SATURATION: 98 % | WEIGHT: 159 LBS | BODY MASS INDEX: 22.26 KG/M2 | HEART RATE: 61 BPM | TEMPERATURE: 97.9 F | DIASTOLIC BLOOD PRESSURE: 64 MMHG | RESPIRATION RATE: 17 BRPM | HEIGHT: 71 IN

## 2023-04-20 DIAGNOSIS — D64.9 ANEMIA, UNSPECIFIED TYPE: ICD-10-CM

## 2023-04-20 DIAGNOSIS — R33.9 URINARY RETENTION: ICD-10-CM

## 2023-04-20 DIAGNOSIS — Z95.818 PRESENCE OF WATCHMAN LEFT ATRIAL APPENDAGE CLOSURE DEVICE: ICD-10-CM

## 2023-04-20 DIAGNOSIS — R73.02 IGT (IMPAIRED GLUCOSE TOLERANCE): ICD-10-CM

## 2023-04-20 DIAGNOSIS — I63.9 CEREBROVASCULAR ACCIDENT (CVA), UNSPECIFIED MECHANISM (HCC): ICD-10-CM

## 2023-04-20 DIAGNOSIS — I25.10 ATHEROSCLEROSIS OF NATIVE CORONARY ARTERY OF NATIVE HEART WITHOUT ANGINA PECTORIS: ICD-10-CM

## 2023-04-20 DIAGNOSIS — I48.0 PAROXYSMAL ATRIAL FIBRILLATION (HCC): ICD-10-CM

## 2023-04-20 DIAGNOSIS — E78.5 HYPERLIPIDEMIA, UNSPECIFIED HYPERLIPIDEMIA TYPE: Primary | ICD-10-CM

## 2023-04-20 PROCEDURE — G8420 CALC BMI NORM PARAMETERS: HCPCS | Performed by: INTERNAL MEDICINE

## 2023-04-20 PROCEDURE — 1123F ACP DISCUSS/DSCN MKR DOCD: CPT | Performed by: INTERNAL MEDICINE

## 2023-04-20 PROCEDURE — 1036F TOBACCO NON-USER: CPT | Performed by: INTERNAL MEDICINE

## 2023-04-20 PROCEDURE — G8427 DOCREV CUR MEDS BY ELIG CLIN: HCPCS | Performed by: INTERNAL MEDICINE

## 2023-04-20 PROCEDURE — 99211 OFF/OP EST MAY X REQ PHY/QHP: CPT

## 2023-04-20 PROCEDURE — 99214 OFFICE O/P EST MOD 30 MIN: CPT | Performed by: INTERNAL MEDICINE

## 2023-04-20 SDOH — ECONOMIC STABILITY: FOOD INSECURITY: WITHIN THE PAST 12 MONTHS, THE FOOD YOU BOUGHT JUST DIDN'T LAST AND YOU DIDN'T HAVE MONEY TO GET MORE.: NEVER TRUE

## 2023-04-20 SDOH — ECONOMIC STABILITY: FOOD INSECURITY: WITHIN THE PAST 12 MONTHS, YOU WORRIED THAT YOUR FOOD WOULD RUN OUT BEFORE YOU GOT MONEY TO BUY MORE.: NEVER TRUE

## 2023-04-20 SDOH — ECONOMIC STABILITY: INCOME INSECURITY: HOW HARD IS IT FOR YOU TO PAY FOR THE VERY BASICS LIKE FOOD, HOUSING, MEDICAL CARE, AND HEATING?: NOT HARD AT ALL

## 2023-04-20 SDOH — ECONOMIC STABILITY: HOUSING INSECURITY
IN THE LAST 12 MONTHS, WAS THERE A TIME WHEN YOU DID NOT HAVE A STEADY PLACE TO SLEEP OR SLEPT IN A SHELTER (INCLUDING NOW)?: NO

## 2023-04-20 ASSESSMENT — PATIENT HEALTH QUESTIONNAIRE - PHQ9
1. LITTLE INTEREST OR PLEASURE IN DOING THINGS: 0
SUM OF ALL RESPONSES TO PHQ QUESTIONS 1-9: 0
SUM OF ALL RESPONSES TO PHQ QUESTIONS 1-9: 0
SUM OF ALL RESPONSES TO PHQ9 QUESTIONS 1 & 2: 0
2. FEELING DOWN, DEPRESSED OR HOPELESS: 0
SUM OF ALL RESPONSES TO PHQ QUESTIONS 1-9: 0
SUM OF ALL RESPONSES TO PHQ QUESTIONS 1-9: 0

## 2023-05-04 ENCOUNTER — TELEPHONE (OUTPATIENT)
Age: 88
End: 2023-05-04

## 2023-05-04 DIAGNOSIS — N39.0 URINARY TRACT INFECTION WITHOUT HEMATURIA, SITE UNSPECIFIED: Primary | ICD-10-CM

## 2023-05-04 NOTE — TELEPHONE ENCOUNTER
LVM for patient to return my call.      Per, Dr. Terrell Best:     Can they drop off ua and culture first?

## 2023-05-04 NOTE — TELEPHONE ENCOUNTER
----- Message from Charles Olivares sent at 5/4/2023 11:45 AM EDT -----  Subject: Message to Provider    QUESTIONS  Information for Provider? believes that he has a UTI. when he tries to   urinate it burns. last had a UTI several months ago and a anti biotic was   call in. 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  ---------------------------------------------------------------------------  --------------  [address]  [phone]; OK to leave message on voicemail  ---------------------------------------------------------------------------  --------------  SCRIPT ANSWERS  Relationship to Patient?  Self

## 2023-05-05 ENCOUNTER — HOSPITAL ENCOUNTER (OUTPATIENT)
Facility: HOSPITAL | Age: 88
Setting detail: SPECIMEN
End: 2023-05-05
Payer: MEDICARE

## 2023-05-05 DIAGNOSIS — N39.0 URINARY TRACT INFECTION WITHOUT HEMATURIA, SITE UNSPECIFIED: ICD-10-CM

## 2023-05-05 LAB
APPEARANCE UR: ABNORMAL
BACTERIA URNS QL MICRO: ABNORMAL /HPF
BILIRUB UR QL: NEGATIVE
COLOR UR: ABNORMAL
EPITH CASTS URNS QL MICRO: ABNORMAL /LPF (ref 0–5)
GLUCOSE UR STRIP.AUTO-MCNC: NEGATIVE MG/DL
HGB UR QL STRIP: ABNORMAL
KETONES UR QL STRIP.AUTO: NEGATIVE MG/DL
LEUKOCYTE ESTERASE UR QL STRIP.AUTO: ABNORMAL
NITRITE UR QL STRIP.AUTO: NEGATIVE
PH UR STRIP: 7 (ref 5–8)
PROT UR STRIP-MCNC: 30 MG/DL
RBC #/AREA URNS HPF: ABNORMAL /HPF (ref 0–5)
SP GR UR REFRACTOMETRY: 1.01 (ref 1–1.03)
UROBILINOGEN UR QL STRIP.AUTO: 1 EU/DL (ref 0.2–1)
WBC URNS QL MICRO: ABNORMAL /HPF (ref 0–4)

## 2023-05-05 PROCEDURE — 87186 SC STD MICRODIL/AGAR DIL: CPT

## 2023-05-05 PROCEDURE — 87086 URINE CULTURE/COLONY COUNT: CPT

## 2023-05-05 PROCEDURE — 87077 CULTURE AEROBIC IDENTIFY: CPT

## 2023-05-05 PROCEDURE — 81001 URINALYSIS AUTO W/SCOPE: CPT

## 2023-05-06 LAB
BACTERIA SPEC CULT: ABNORMAL
CC UR VC: ABNORMAL
SERVICE CMNT-IMP: ABNORMAL

## 2023-05-08 ENCOUNTER — TELEPHONE (OUTPATIENT)
Age: 88
End: 2023-05-08

## 2023-05-08 DIAGNOSIS — N30.00 ACUTE CYSTITIS WITHOUT HEMATURIA: Primary | ICD-10-CM

## 2023-05-08 LAB
BACTERIA SPEC CULT: ABNORMAL
BACTERIA SPEC CULT: ABNORMAL
CC UR VC: ABNORMAL
SERVICE CMNT-IMP: ABNORMAL

## 2023-05-08 RX ORDER — CIPROFLOXACIN 500 MG/1
500 TABLET, FILM COATED ORAL 2 TIMES DAILY
Qty: 20 TABLET | Refills: 0 | Status: SHIPPED | OUTPATIENT
Start: 2023-05-08 | End: 2023-05-18

## 2023-07-11 NOTE — TELEPHONE ENCOUNTER
PCP:  Lorie Grimm MD    Last appt: [unfilled]  Future Appointments   Date Time Provider [address]   10/12/2023 11:45 AM IO LAB VISIT [facility] BERNABE BEYER   10/19/2023  2:00 PM Lorie Grimm MD [facility] BS KAYLEEN       Requested Prescriptions     Pending Prescriptions Disp Refills    furosemide (LASIX) 20 MG tablet 90 tablet 3     Sig: Take 1 tablet by mouth every morning

## 2023-07-14 RX ORDER — FUROSEMIDE 20 MG/1
20 TABLET ORAL EVERY MORNING
Qty: 90 TABLET | Refills: 3 | Status: SHIPPED | OUTPATIENT
Start: 2023-07-14

## 2023-08-15 ENCOUNTER — TELEPHONE (OUTPATIENT)
Age: 88
End: 2023-08-15

## 2023-08-15 ENCOUNTER — HOSPITAL ENCOUNTER (OUTPATIENT)
Facility: HOSPITAL | Age: 88
Setting detail: SPECIMEN
Discharge: HOME | End: 2023-08-18
Payer: MEDICARE

## 2023-08-15 ENCOUNTER — NURSE ONLY (OUTPATIENT)
Age: 88
End: 2023-08-15
Payer: MEDICARE

## 2023-08-15 DIAGNOSIS — R30.0 DYSURIA: ICD-10-CM

## 2023-08-15 DIAGNOSIS — R30.0 DYSURIA: Primary | ICD-10-CM

## 2023-08-15 LAB
BILIRUBIN, URINE, POC: NEGATIVE
BLOOD URINE, POC: NORMAL
GLUCOSE URINE, POC: NEGATIVE
KETONES, URINE, POC: NEGATIVE
LEUKOCYTE ESTERASE, URINE, POC: NORMAL
NITRITE, URINE, POC: POSITIVE
PH, URINE, POC: 7 (ref 4.6–8)
PROTEIN,URINE, POC: NORMAL
SPECIFIC GRAVITY, URINE, POC: 1.02 (ref 1–1.03)
URINALYSIS CLARITY, POC: NORMAL
URINALYSIS COLOR, POC: NORMAL
UROBILINOGEN, POC: NORMAL

## 2023-08-15 PROCEDURE — 87077 CULTURE AEROBIC IDENTIFY: CPT

## 2023-08-15 PROCEDURE — 87186 SC STD MICRODIL/AGAR DIL: CPT

## 2023-08-15 PROCEDURE — PBSHW AMB POC URINALYSIS DIP STICK AUTO W/O MICRO: Performed by: INTERNAL MEDICINE

## 2023-08-15 PROCEDURE — 87086 URINE CULTURE/COLONY COUNT: CPT

## 2023-08-15 PROCEDURE — 81003 URINALYSIS AUTO W/O SCOPE: CPT | Performed by: INTERNAL MEDICINE

## 2023-08-15 RX ORDER — SULFAMETHOXAZOLE AND TRIMETHOPRIM 800; 160 MG/1; MG/1
1 TABLET ORAL 2 TIMES DAILY
Qty: 10 TABLET | Refills: 0 | Status: SHIPPED | OUTPATIENT
Start: 2023-08-15 | End: 2023-08-20

## 2023-08-15 NOTE — TELEPHONE ENCOUNTER
Patient called and states he believes he has a UTI, he has had some urinary burning since last week that has gotten slightly worse. He is calling to see what he should do. Patient can be reached at [phone]. Please advise, thank you.

## 2023-08-18 LAB
BACTERIA SPEC CULT: ABNORMAL
CC UR VC: ABNORMAL
SERVICE CMNT-IMP: ABNORMAL

## 2023-09-27 ENCOUNTER — TELEPHONE (OUTPATIENT)
Age: 88
End: 2023-09-27

## 2023-09-27 NOTE — TELEPHONE ENCOUNTER
Patient called and states he believes he has a UTI. He has been experiencing burning with urination since last Thursday. He wants to know if he can come in and leave a urine sample. Please advise. Patient can be reached at [phone].

## 2023-09-28 ENCOUNTER — HOSPITAL ENCOUNTER (OUTPATIENT)
Facility: HOSPITAL | Age: 88
Setting detail: SPECIMEN
Discharge: HOME | End: 2023-09-28
Payer: MEDICARE

## 2023-09-28 ENCOUNTER — TELEPHONE (OUTPATIENT)
Age: 88
End: 2023-09-28

## 2023-09-28 DIAGNOSIS — N30.00 ACUTE CYSTITIS WITHOUT HEMATURIA: Primary | ICD-10-CM

## 2023-09-28 DIAGNOSIS — R30.0 DYSURIA: ICD-10-CM

## 2023-09-28 DIAGNOSIS — R30.0 DYSURIA: Primary | ICD-10-CM

## 2023-09-28 LAB
BILIRUBIN, URINE, POC: NEGATIVE
BLOOD URINE, POC: ABNORMAL
GLUCOSE URINE, POC: NEGATIVE
KETONES, URINE, POC: NEGATIVE
LEUKOCYTE ESTERASE, URINE, POC: ABNORMAL
NITRITE, URINE, POC: POSITIVE
PH, URINE, POC: 6 (ref 4.6–8)
PROTEIN,URINE, POC: ABNORMAL
SPECIFIC GRAVITY, URINE, POC: 1 (ref 1–1.03)
URINALYSIS CLARITY, POC: ABNORMAL
URINALYSIS COLOR, POC: ABNORMAL
UROBILINOGEN, POC: ABNORMAL

## 2023-09-28 PROCEDURE — 87077 CULTURE AEROBIC IDENTIFY: CPT

## 2023-09-28 PROCEDURE — 87086 URINE CULTURE/COLONY COUNT: CPT

## 2023-09-28 PROCEDURE — 87186 SC STD MICRODIL/AGAR DIL: CPT

## 2023-09-28 PROCEDURE — PBSHW AMB POC URINALYSIS DIP STICK AUTO W/O MICRO: Performed by: INTERNAL MEDICINE

## 2023-09-28 RX ORDER — SULFAMETHOXAZOLE AND TRIMETHOPRIM 800; 160 MG/1; MG/1
1 TABLET ORAL 2 TIMES DAILY
Qty: 20 TABLET | Refills: 0 | Status: SHIPPED | OUTPATIENT
Start: 2023-09-28 | End: 2023-10-08

## 2023-09-30 LAB
BACTERIA SPEC CULT: ABNORMAL
CC UR VC: ABNORMAL
SERVICE CMNT-IMP: ABNORMAL

## 2023-10-03 ENCOUNTER — TELEPHONE (OUTPATIENT)
Age: 88
End: 2023-10-03

## 2023-10-03 DIAGNOSIS — N30.00 ACUTE CYSTITIS WITHOUT HEMATURIA: Primary | ICD-10-CM

## 2023-10-03 RX ORDER — CIPROFLOXACIN 500 MG/1
500 TABLET, FILM COATED ORAL 2 TIMES DAILY
Qty: 20 TABLET | Refills: 0 | Status: SHIPPED | OUTPATIENT
Start: 2023-10-03 | End: 2023-10-13

## 2023-10-04 NOTE — TELEPHONE ENCOUNTER
This RN called and left a voice mail to inform patient that I would send him a My Chart message regarding the testing that he did on 9/28/23.

## 2023-10-04 NOTE — TELEPHONE ENCOUNTER
Left message for patient to call the office.      RE:  Urine cx growing bug resistant to abx we gave him  Chaging over to cipro x10 d

## 2023-10-04 NOTE — TELEPHONE ENCOUNTER
This RN sent a message via My Chart to ask that patient stop taking the original antibiotic that was ordered for his urinary tract infection and start taking Cipro instead.

## 2023-10-06 NOTE — TELEPHONE ENCOUNTER
This RN called patient to inform him that he needs to stop taking the Bactrim medication and start taking Cipro that was sent to his pharmacy on 10/3/23. Patient stated that he started having an allergic reaction to the Bactrim on Friday and was seen at [facility] ED on Saturday 9/30/23 and ordered a new antibiotic but he is not sure of the name of the medication (no information available in [facility] since he went to a [facility]). Patient states that the medication that he got from the ED seems to be working.
